# Patient Record
Sex: MALE | Race: OTHER
[De-identification: names, ages, dates, MRNs, and addresses within clinical notes are randomized per-mention and may not be internally consistent; named-entity substitution may affect disease eponyms.]

---

## 2018-01-23 ENCOUNTER — HOSPITAL ENCOUNTER (INPATIENT)
Dept: HOSPITAL 54 - ER | Age: 68
LOS: 4 days | Discharge: HOME | DRG: 280 | End: 2018-01-27
Attending: FAMILY MEDICINE | Admitting: FAMILY MEDICINE
Payer: MEDICARE

## 2018-01-23 VITALS — HEIGHT: 70 IN | WEIGHT: 195 LBS | BODY MASS INDEX: 27.92 KG/M2

## 2018-01-23 VITALS — SYSTOLIC BLOOD PRESSURE: 104 MMHG | DIASTOLIC BLOOD PRESSURE: 80 MMHG

## 2018-01-23 DIAGNOSIS — E66.2: ICD-10-CM

## 2018-01-23 DIAGNOSIS — F17.210: ICD-10-CM

## 2018-01-23 DIAGNOSIS — Z79.01: ICD-10-CM

## 2018-01-23 DIAGNOSIS — E11.22: ICD-10-CM

## 2018-01-23 DIAGNOSIS — E44.1: ICD-10-CM

## 2018-01-23 DIAGNOSIS — I21.A1: ICD-10-CM

## 2018-01-23 DIAGNOSIS — I13.0: Primary | ICD-10-CM

## 2018-01-23 DIAGNOSIS — E88.09: ICD-10-CM

## 2018-01-23 DIAGNOSIS — Z95.810: ICD-10-CM

## 2018-01-23 DIAGNOSIS — I48.91: ICD-10-CM

## 2018-01-23 DIAGNOSIS — I25.10: ICD-10-CM

## 2018-01-23 DIAGNOSIS — I50.33: ICD-10-CM

## 2018-01-23 DIAGNOSIS — Z86.73: ICD-10-CM

## 2018-01-23 DIAGNOSIS — E78.5: ICD-10-CM

## 2018-01-23 DIAGNOSIS — N17.0: ICD-10-CM

## 2018-01-23 DIAGNOSIS — J96.21: ICD-10-CM

## 2018-01-23 DIAGNOSIS — E11.65: ICD-10-CM

## 2018-01-23 DIAGNOSIS — Z66: ICD-10-CM

## 2018-01-23 DIAGNOSIS — Z95.1: ICD-10-CM

## 2018-01-23 DIAGNOSIS — N18.9: ICD-10-CM

## 2018-01-23 DIAGNOSIS — Z79.4: ICD-10-CM

## 2018-01-23 DIAGNOSIS — J96.22: ICD-10-CM

## 2018-01-23 LAB
ALBUMIN SERPL BCP-MCNC: 3 G/DL (ref 3.4–5)
ALP SERPL-CCNC: 220 U/L (ref 46–116)
ALT SERPL W P-5'-P-CCNC: 17 U/L (ref 12–78)
APTT PPP: 29 SEC (ref 23–34)
AST SERPL W P-5'-P-CCNC: 17 U/L (ref 15–37)
BASOPHILS # BLD AUTO: 0.3 /CMM (ref 0–0.2)
BASOPHILS NFR BLD AUTO: 4 % (ref 0–2)
BILIRUB DIRECT SERPL-MCNC: 0.3 MG/DL (ref 0–0.2)
BILIRUB SERPL-MCNC: 0.5 MG/DL (ref 0.2–1)
BUN SERPL-MCNC: 57 MG/DL (ref 7–18)
CALCIUM SERPL-MCNC: 8.5 MG/DL (ref 8.5–10.1)
CHLORIDE SERPL-SCNC: 102 MMOL/L (ref 98–107)
CO2 SERPL-SCNC: 30 MMOL/L (ref 21–32)
CREAT SERPL-MCNC: 3.4 MG/DL (ref 0.6–1.3)
EOSINOPHIL # BLD AUTO: 0.3 /CMM (ref 0–0.7)
EOSINOPHIL NFR BLD AUTO: 2.9 % (ref 0–6)
GLUCOSE SERPL-MCNC: 365 MG/DL (ref 74–106)
HCT VFR BLD AUTO: 48 % (ref 39–51)
HGB BLD-MCNC: 14.6 G/DL (ref 13.5–17.5)
INR PPP: 1.22 (ref 0.85–1.15)
LYMPHOCYTES NFR BLD AUTO: 1.4 /CMM (ref 0.8–4.8)
LYMPHOCYTES NFR BLD AUTO: 15.8 % (ref 20–44)
MCH RBC QN AUTO: 25 PG (ref 26–33)
MCHC RBC AUTO-ENTMCNC: 31 G/DL (ref 31–36)
MCV RBC AUTO: 81 FL (ref 80–96)
MONOCYTES NFR BLD AUTO: 0.7 /CMM (ref 0.1–1.3)
MONOCYTES NFR BLD AUTO: 7.5 % (ref 2–12)
NEUTROPHILS # BLD AUTO: 6.1 /CMM (ref 1.8–8.9)
NEUTROPHILS NFR BLD AUTO: 69.8 % (ref 43–81)
NT-PROBNP SERPL-MCNC: 4522 PG/ML (ref 0–125)
PLATELET # BLD AUTO: 188 /CMM (ref 150–450)
POTASSIUM SERPL-SCNC: 4.9 MMOL/L (ref 3.5–5.1)
PROT SERPL-MCNC: 7.4 G/DL (ref 6.4–8.2)
RBC # BLD AUTO: 5.89 MIL/UL (ref 4.5–6)
RDW COEFFICIENT OF VARIATION: 17.9 (ref 11.5–15)
SODIUM SERPL-SCNC: 138 MMOL/L (ref 136–145)
TROPONIN I SERPL-MCNC: 0.55 NG/ML (ref 0–0.06)
WBC NRBC COR # BLD AUTO: 8.8 K/UL (ref 4.3–11)

## 2018-01-23 PROCEDURE — A4606 OXYGEN PROBE USED W OXIMETER: HCPCS

## 2018-01-23 PROCEDURE — Z7610: HCPCS

## 2018-01-23 PROCEDURE — A4349 DISPOSABLE MALE EXTERNAL CAT: HCPCS

## 2018-01-23 RX ADMIN — INSULIN HUMAN PRN UNIT: 100 INJECTION, SOLUTION PARENTERAL at 21:54

## 2018-01-23 RX ADMIN — Medication SCH EACH: at 21:51

## 2018-01-23 NOTE — NUR
NANETTE RN NOTE

PT DENIES ANY CHEST PAIN OR DISCOMFORT. TROP RESULT CAME 0.647. REMAIN A FIB. CONTINUE TO 
MONITOR. VSS. PT AT TIMES DONOT FOLLOW THE IMPORTANCE OF O2 MASK ON. KEPT ON REMOVING IT. 
STATES "I AM OK DON'T WORRY". TEACHING ABOUT THE O2 DONE.

## 2018-01-23 NOTE — NUR
NANETTE RN NOTE

PT DENIED ANY SKIN ISSUES EARLIER, NOTED RT KNEE WITH BRUISE AND DR COTE, PER PT HE FELL 
DOWN BEFORE.

## 2018-01-23 NOTE — NUR
NANETTE RN NOTE

RECIEVED PT IN BED ASLEEP, AROUSABLE. A/O X 4, NO SOB NOTED. NO DISTRESS OR DISCOMFORT 
NOTED. DENIES PAIN. ON O2 15L SIMPLE MASK O2 SAT 94%. PT GETS SOB ON EXCERTION AND ALSO PT 
KEPT ON REMOVING THE MASK, REMINDED HIM NOT TO DO THAT.  ON TELE MONITOR A FIB HR 70'S 
CONTROLLED. SL #20 G IN LT HAND INTACT AND PATENT, NO S/S OF INFILTRATION NOTED. SKIN 
INTACT. FAMILY AT BED SIDE. KEPT HOB ELEVATED. WAITING FOR DR FAIR ADMITTING ORDERS, 
VSS. ORIENTED HIM TO HIS ROOM. SIDE RAILS UP X 2 AND CALL LIGHT WITHIN REACH. CONTINUE TO  
MONITOR HIM.

## 2018-01-23 NOTE — NUR
BBRA FROM HOME FOR SOB X 3 DAYS. PATIENT RECIEVED AWAKE AND ALERT, ON O2 VA NC 
@2LPM SATING 955. PATIENT APPEARS IN NO APPARENT DISTRESS. SKN IS WARM TO TOUCH 
AND NON DIAPHORETIC. PATIENT IS AFEBRILE. VSS.

## 2018-01-23 NOTE — NUR
NANETTE RN NOTE

DR RIZWAN SWARTZ INFORMED REGARDING TROP ELEVATED. AND CARDIAC CONSULT DR DARLING IN AM. NO 
NEW ORDER GIVEN AT THIS TIME.

## 2018-01-23 NOTE — NUR
NANETTE RN NOTE

DR FAIR VISITED THE FLOOR AND GAVE ADMITTING ORDERS, PT'S ADMITTING DX IS CHF 
EXACERBATION. ADMITTING ORDERS CHECKED AND CARRIED OUT.

## 2018-01-23 NOTE — NUR
NANETTE JEFF NOTE

PT MASK IS CHANGED TO REBRETHER MASK, O2 15L O2 SAT 96% AND AT ROOM AIR IT DROPS TO 82. ALSO 
REMINDED PT NOT TO REMOVE THE MASK. KEPT HOB ELEVATED TO HELP HIM BREATH BETTER. 

-------------------------------------------------------------------------------

Addendum: 01/24/18 at 0519 by SUMAN PRATT RN

-------------------------------------------------------------------------------

KEPT THE SIMPLE MASK. ERROR WRITTEN CHANGED TO REBREATHER MASK.

## 2018-01-24 VITALS — SYSTOLIC BLOOD PRESSURE: 94 MMHG | DIASTOLIC BLOOD PRESSURE: 60 MMHG

## 2018-01-24 VITALS — SYSTOLIC BLOOD PRESSURE: 128 MMHG | DIASTOLIC BLOOD PRESSURE: 50 MMHG

## 2018-01-24 VITALS — DIASTOLIC BLOOD PRESSURE: 67 MMHG | SYSTOLIC BLOOD PRESSURE: 103 MMHG

## 2018-01-24 VITALS — DIASTOLIC BLOOD PRESSURE: 61 MMHG | SYSTOLIC BLOOD PRESSURE: 134 MMHG

## 2018-01-24 VITALS — SYSTOLIC BLOOD PRESSURE: 113 MMHG | DIASTOLIC BLOOD PRESSURE: 74 MMHG

## 2018-01-24 VITALS — DIASTOLIC BLOOD PRESSURE: 57 MMHG | SYSTOLIC BLOOD PRESSURE: 84 MMHG

## 2018-01-24 VITALS — DIASTOLIC BLOOD PRESSURE: 63 MMHG | SYSTOLIC BLOOD PRESSURE: 100 MMHG

## 2018-01-24 VITALS — SYSTOLIC BLOOD PRESSURE: 101 MMHG | DIASTOLIC BLOOD PRESSURE: 61 MMHG

## 2018-01-24 VITALS — DIASTOLIC BLOOD PRESSURE: 56 MMHG | SYSTOLIC BLOOD PRESSURE: 108 MMHG

## 2018-01-24 VITALS — SYSTOLIC BLOOD PRESSURE: 103 MMHG | DIASTOLIC BLOOD PRESSURE: 67 MMHG

## 2018-01-24 VITALS — SYSTOLIC BLOOD PRESSURE: 100 MMHG | DIASTOLIC BLOOD PRESSURE: 63 MMHG

## 2018-01-24 VITALS — SYSTOLIC BLOOD PRESSURE: 106 MMHG | DIASTOLIC BLOOD PRESSURE: 70 MMHG

## 2018-01-24 VITALS — SYSTOLIC BLOOD PRESSURE: 134 MMHG | DIASTOLIC BLOOD PRESSURE: 61 MMHG

## 2018-01-24 VITALS — DIASTOLIC BLOOD PRESSURE: 69 MMHG | SYSTOLIC BLOOD PRESSURE: 115 MMHG

## 2018-01-24 VITALS — SYSTOLIC BLOOD PRESSURE: 152 MMHG | DIASTOLIC BLOOD PRESSURE: 45 MMHG

## 2018-01-24 VITALS — DIASTOLIC BLOOD PRESSURE: 74 MMHG | SYSTOLIC BLOOD PRESSURE: 113 MMHG

## 2018-01-24 VITALS — DIASTOLIC BLOOD PRESSURE: 60 MMHG | SYSTOLIC BLOOD PRESSURE: 110 MMHG

## 2018-01-24 VITALS — DIASTOLIC BLOOD PRESSURE: 70 MMHG | SYSTOLIC BLOOD PRESSURE: 106 MMHG

## 2018-01-24 VITALS — DIASTOLIC BLOOD PRESSURE: 55 MMHG | SYSTOLIC BLOOD PRESSURE: 95 MMHG

## 2018-01-24 VITALS — SYSTOLIC BLOOD PRESSURE: 102 MMHG | DIASTOLIC BLOOD PRESSURE: 58 MMHG

## 2018-01-24 VITALS — SYSTOLIC BLOOD PRESSURE: 107 MMHG | DIASTOLIC BLOOD PRESSURE: 56 MMHG

## 2018-01-24 LAB
BASE EXCESS BLDA CALC-SCNC: -0.1 MMOL/L
BASE EXCESS BLDA CALC-SCNC: -0.4 MMOL/L
BASE EXCESS BLDA CALC-SCNC: -2.7 MMOL/L
BASE EXCESS BLDA CALC-SCNC: -4.8 MMOL/L
BASOPHILS # BLD AUTO: 0.2 /CMM (ref 0–0.2)
BASOPHILS NFR BLD AUTO: 1.8 % (ref 0–2)
BUN SERPL-MCNC: 53 MG/DL (ref 7–18)
CALCIUM SERPL-MCNC: 8.4 MG/DL (ref 8.5–10.1)
CHLORIDE SERPL-SCNC: 104 MMOL/L (ref 98–107)
CHOLEST SERPL-MCNC: 92 MG/DL (ref ?–200)
CO2 SERPL-SCNC: 32 MMOL/L (ref 21–32)
CREAT SERPL-MCNC: 3.2 MG/DL (ref 0.6–1.3)
DO-HGB MFR BLDA: 177.4 MMHG
DO-HGB MFR BLDA: 197.5 MMHG
DO-HGB MFR BLDA: 198.7 MMHG
DO-HGB MFR BLDA: 255.9 MMHG
EOSINOPHIL # BLD AUTO: 0.3 /CMM (ref 0–0.7)
EOSINOPHIL NFR BLD AUTO: 2.8 % (ref 0–6)
GLUCOSE SERPL-MCNC: 122 MG/DL (ref 74–106)
HCT VFR BLD AUTO: 47 % (ref 39–51)
HDLC SERPL-MCNC: 40 MG/DL (ref 40–60)
HGB BLD-MCNC: 14.3 G/DL (ref 13.5–17.5)
INHALED O2 CONCENTRATION: 45 %
INHALED O2 CONCENTRATION: 45 %
INHALED O2 CONCENTRATION: 52 %
INHALED O2 CONCENTRATION: 55 %
INHALED O2 FLOW RATE: 8 L/MIN (ref 0–30)
INTRINSIC PEEP RESPIRATORY: 5 CM H2O
LDLC SERPL DIRECT ASSAY-MCNC: 45 MG/DL (ref 0–99)
LYMPHOCYTES NFR BLD AUTO: 1.2 /CMM (ref 0.8–4.8)
LYMPHOCYTES NFR BLD AUTO: 12.1 % (ref 20–44)
MAGNESIUM SERPL-MCNC: 2.2 MG/DL (ref 1.8–2.4)
MCH RBC QN AUTO: 25 PG (ref 26–33)
MCHC RBC AUTO-ENTMCNC: 30 G/DL (ref 31–36)
MCV RBC AUTO: 82 FL (ref 80–96)
MONOCYTES NFR BLD AUTO: 0.8 /CMM (ref 0.1–1.3)
MONOCYTES NFR BLD AUTO: 8.4 % (ref 2–12)
NEUTROPHILS # BLD AUTO: 7.6 /CMM (ref 1.8–8.9)
NEUTROPHILS NFR BLD AUTO: 74.9 % (ref 43–81)
PCO2 TEMP ADJ BLDA: 60.8 MMHG (ref 35–45)
PCO2 TEMP ADJ BLDA: 62 MMHG (ref 35–45)
PCO2 TEMP ADJ BLDA: 70.7 MMHG (ref 35–45)
PCO2 TEMP ADJ BLDA: 87.6 MMHG (ref 35–45)
PEEP SETTING VENT: 360 ML
PH TEMP ADJ BLDA: 7.11 [PH] (ref 7.35–7.45)
PH TEMP ADJ BLDA: 7.24 [PH] (ref 7.35–7.45)
PH TEMP ADJ BLDA: 7.25 [PH] (ref 7.35–7.45)
PH TEMP ADJ BLDA: 7.27 [PH] (ref 7.35–7.45)
PHOSPHATE SERPL-MCNC: 6.1 MG/DL (ref 2.5–4.9)
PLATELET # BLD AUTO: 169 /CMM (ref 150–450)
PO2 TEMP ADJ BLDA: 53 MMHG (ref 75–100)
PO2 TEMP ADJ BLDA: 63 MMHG (ref 75–100)
PO2 TEMP ADJ BLDA: 67.3 MMHG (ref 75–100)
PO2 TEMP ADJ BLDA: 75.2 MMHG (ref 75–100)
POTASSIUM SERPL-SCNC: 5.3 MMOL/L (ref 3.5–5.1)
RBC # BLD AUTO: 5.73 MIL/UL (ref 4.5–6)
RDW COEFFICIENT OF VARIATION: 19.6 (ref 11.5–15)
SAO2 % BLDA: 86.5 % (ref 92–98.5)
SAO2 % BLDA: 91.1 % (ref 92–98.5)
SAO2 % BLDA: 92.9 % (ref 92–98.5)
SAO2 % BLDA: 93 % (ref 92–98.5)
SET RATE, BG: 14
SET RATE, BG: 16
SET RATE, BG: 16
SODIUM SERPL-SCNC: 140 MMOL/L (ref 136–145)
TRIGL SERPL-MCNC: 70 MG/DL (ref 30–150)
VENTILATION MODE VENT: (no result)
WBC NRBC COR # BLD AUTO: 10.1 K/UL (ref 4.3–11)

## 2018-01-24 PROCEDURE — 5A09357 ASSISTANCE WITH RESPIRATORY VENTILATION, LESS THAN 24 CONSECUTIVE HOURS, CONTINUOUS POSITIVE AIRWAY PRESSURE: ICD-10-PCS | Performed by: INTERNAL MEDICINE

## 2018-01-24 RX ADMIN — Medication SCH EACH: at 22:18

## 2018-01-24 RX ADMIN — PANTOPRAZOLE SODIUM SCH MG: 40 TABLET, DELAYED RELEASE ORAL at 09:00

## 2018-01-24 RX ADMIN — SODIUM CHLORIDE SCH MG: 9 INJECTION, SOLUTION INTRAVENOUS at 12:33

## 2018-01-24 RX ADMIN — ATORVASTATIN CALCIUM SCH MG: 40 TABLET, FILM COATED ORAL at 22:18

## 2018-01-24 RX ADMIN — INSULIN GLARGINE SCH UNIT: 100 INJECTION, SOLUTION SUBCUTANEOUS at 22:00

## 2018-01-24 RX ADMIN — ALBUTEROL SULFATE SCH MG: 2.5 SOLUTION RESPIRATORY (INHALATION) at 03:25

## 2018-01-24 RX ADMIN — ALBUTEROL SULFATE SCH MG: 2.5 SOLUTION RESPIRATORY (INHALATION) at 20:45

## 2018-01-24 RX ADMIN — SODIUM CHLORIDE SCH MG: 9 INJECTION, SOLUTION INTRAVENOUS at 17:12

## 2018-01-24 RX ADMIN — SODIUM CHLORIDE SCH MG: 9 INJECTION, SOLUTION INTRAVENOUS at 09:29

## 2018-01-24 RX ADMIN — GLIMEPIRIDE SCH MG: 4 TABLET ORAL at 09:00

## 2018-01-24 RX ADMIN — ALBUTEROL SULFATE SCH MG: 2.5 SOLUTION RESPIRATORY (INHALATION) at 23:11

## 2018-01-24 RX ADMIN — Medication SCH MG: at 12:32

## 2018-01-24 RX ADMIN — ALBUTEROL SULFATE SCH MG: 2.5 SOLUTION RESPIRATORY (INHALATION) at 07:39

## 2018-01-24 RX ADMIN — Medication SCH EACH: at 12:33

## 2018-01-24 RX ADMIN — FUROSEMIDE SCH MG: 10 INJECTION, SOLUTION INTRAMUSCULAR; INTRAVENOUS at 09:00

## 2018-01-24 RX ADMIN — ALBUTEROL SULFATE SCH MG: 2.5 SOLUTION RESPIRATORY (INHALATION) at 12:14

## 2018-01-24 RX ADMIN — Medication SCH EACH: at 09:08

## 2018-01-24 RX ADMIN — ALBUTEROL SULFATE SCH MG: 2.5 SOLUTION RESPIRATORY (INHALATION) at 16:27

## 2018-01-24 RX ADMIN — Medication SCH EACH: at 17:43

## 2018-01-24 RX ADMIN — Medication SCH EA: at 17:12

## 2018-01-24 NOTE — NUR
RT NOTE:

LATE ENTRY- PATIENT FOUND IN DISTRESS ON A NON-REBREATHER MASK AT 15 LPM. SP02 94%. NOTIFIED 
CHARGE NURSE (AMARA). PLACED ON HHN VIA AEROSOL MASK AND ABG WAS ORDERED AND DRAWN.

ABG RESULTS WERE REPORTED TO CHARGE NURSE. 

[id number] WAS TRANSFERRED TO ICU AND PLACED BIPAP. SETTINGS PER ORDER. PATIENT 
TOLERATING WELL. ALARMS SET AND AUDIBLE. CHANGES MADE PER 'S ORDER. AMBU BAG AT [facility].

## 2018-01-24 NOTE — NUR
NANETTE RN NOTES



PATIENT TRANSFERRED TO ICU . REPORT GIVEN EMETERIO WAKEFIELD

ALL BELONGINGS CHECKED AND ENDORSED.

-------------------------------------------------------------------------------

Addendum: 01/24/18 at 0946 by RAMY NAJERA RN

-------------------------------------------------------------------------------

ADDENDUM



UNABLE TO OBTAIN ACCUCHECK DUE TO ONGOING [facility] STAFF MEETING AT THE FLOOR AND PATIENT 
TRANSFERRED RIGHT AWAY.

## 2018-01-24 NOTE — NUR
NANETTE RN NOTE

TROPONIN LEVEL TRENDING DOWN TO 0.577, DR DOMINGUEZ MADE AWARE. PT DENIES ANY CHEST PAIN.

## 2018-01-24 NOTE — NUR
ICU RN NOTE



0830: Received patient from [facility] for Bipap needs secondary to CHF exacerbation. Patient is 
awake, A/Ox3, Syrian speaking, understands little English. No c/o discomfort at this time. 
Sat 86% on NRB mask. Placed by RT on Bipap, 18/5 45%, rate 16. With PIV on LH g20, placed 
new PIV RH g18. Afib 80's. Noted with crackles and rales during auscultation. Will keep NPO 
for now for Bipap.



0900: Paced on condom catheter for output monitoring. Patient agreed. Asked family to bring 
Eliquis from home per pharmacy, wife said she will get it after lunch, made pharmacy aware. 
S/E by Dr. Gar, with order to given 80 Lasix x3 today.



0920: S/E by Dr. Chávez, with order to repeat ABG after 1 hr.



1050: Dr. Chávez aware for ABG, with order to change Bipap setting 25/10, and repeat ABG @ 
1300, RT aware.

## 2018-01-24 NOTE — NUR
NANETTE RN AM NOTE



RECEIVED PT IN BED, EYES CLOSED, RESPONDS TO NAME AND TOUCH, AOX 3, ON NON REBREATHING MASK 
ON O2 15L O2 SAT 94%. RESPIRATION UNLABORED,TELE METRY READS AFIB HR 84, DENIES CHEST 
PAIN/DISCOMFORT. LEFT HAND G20 IVHL, FLUSHES WELL, SITE CLEAR. SE  NURSING FLOWSHEET FOR 
SKIN ISSUES. CARDIAC CCHO DIET. BEDBOUND FOR NOW. SIDE RAILS UP X 2 AND CALL LIGHT WITHIN 
REACH. WILL CONTINUE TO MONITOR.

## 2018-01-24 NOTE — NUR
NANETTE RN NOTE

DR SWARTZ INFORMED THAT PT IS VERY CONGESTED WITH SOB, MD GAVE NEW ORDER OF BTX, ORDER NOTED 
AND CARRIED OUT. 

-------------------------------------------------------------------------------

Addendum: 01/24/18 at 0236 by SUMAN PRATT RN

-------------------------------------------------------------------------------

RT INFORMED.

## 2018-01-24 NOTE — NUR
ICU RN NOTE



Patient will be on Bipap overnight, patient remained compliant. Kept clean, warm and dry. 
Needs attended. Kept call light at reach. PIVs intact. No any significant changes noted at 
this time. Condom catheter intact. Noted with 2050mL urine output during the shift.

## 2018-01-24 NOTE — NUR
NANETTE RN NOTE

PT NOTED O2 SAT DROPPING TO 89%, MASK CHANGE TO NON RE BREATHING. O2 SAT WENT UP TO 95% ON 
15 L. KEPT HIGH PHILLIPS POSITION.

## 2018-01-24 NOTE — NUR
NANETTE RN NOTE

PT NOTED O2 SAT 91% LUNGS SOUND IS CRACKLING WHEN AUSCULTATED. DR SWARTZ INFORMED AND 
RECEIVED NEW ORDER, ORDER NOTED AND CARRIED OUT. LASIX 40 MG IVP GIVEN. K 4.9. VSS. CONTINUE 
TO MONITOR HIM.

## 2018-01-24 NOTE — NUR
NANETTE RN NOTE

PT IN BED ASLEEP, EASILY AROUSABLE. REMAIN ON NON REBREATHING MASK ON O2 15L O2 SAT 96%. SL 
ON LT HAND INTACT AND PATENT. ON TELE A FIB HR 'S. SIDE RAILS UP X 2 AND CALL LIGHT 
WITHIN REACH. WILL ENDORSE TO DAY SHIFT NURSE FOR CONTINUE TO CARE.

## 2018-01-24 NOTE — NUR
ICU RN INITIAL NOTE

RECEIVED PATIENT FROM EMETERIO JEFF. PT IN BED. A/A/O X4. ON BIPAP. TOLERATING SETTINGS. 25/8 
RATE 55% RATE 16. LUNG SOUNDS COARSE. BOWEL SOUNDS PRESENT. CONDOM CATH PATENT AND INTACT. 
PULSES PRESENT. IV PATENT AND INTACT. PT MOVES INDEPENDENTLY IN BED. BED IN LOW LOCKED 
POSITION. WILL CONTINUE TO MONITOR.

## 2018-01-24 NOTE — NUR
ICU RN NOTE



Updated Dr. Chávez re: ABG result, ordered no changes and will do ABG off Bipap in am. Wife 
at bedside, patient and wife aware for the POC.

## 2018-01-25 VITALS — DIASTOLIC BLOOD PRESSURE: 67 MMHG | SYSTOLIC BLOOD PRESSURE: 105 MMHG

## 2018-01-25 VITALS — DIASTOLIC BLOOD PRESSURE: 59 MMHG | SYSTOLIC BLOOD PRESSURE: 106 MMHG

## 2018-01-25 VITALS — DIASTOLIC BLOOD PRESSURE: 53 MMHG | SYSTOLIC BLOOD PRESSURE: 104 MMHG

## 2018-01-25 VITALS — DIASTOLIC BLOOD PRESSURE: 48 MMHG | SYSTOLIC BLOOD PRESSURE: 91 MMHG

## 2018-01-25 VITALS — SYSTOLIC BLOOD PRESSURE: 113 MMHG | DIASTOLIC BLOOD PRESSURE: 71 MMHG

## 2018-01-25 VITALS — SYSTOLIC BLOOD PRESSURE: 110 MMHG | DIASTOLIC BLOOD PRESSURE: 54 MMHG

## 2018-01-25 VITALS — DIASTOLIC BLOOD PRESSURE: 61 MMHG | SYSTOLIC BLOOD PRESSURE: 119 MMHG

## 2018-01-25 VITALS — DIASTOLIC BLOOD PRESSURE: 71 MMHG | SYSTOLIC BLOOD PRESSURE: 113 MMHG

## 2018-01-25 VITALS — DIASTOLIC BLOOD PRESSURE: 60 MMHG | SYSTOLIC BLOOD PRESSURE: 108 MMHG

## 2018-01-25 VITALS — DIASTOLIC BLOOD PRESSURE: 45 MMHG | SYSTOLIC BLOOD PRESSURE: 105 MMHG

## 2018-01-25 VITALS — DIASTOLIC BLOOD PRESSURE: 64 MMHG | SYSTOLIC BLOOD PRESSURE: 132 MMHG

## 2018-01-25 VITALS — SYSTOLIC BLOOD PRESSURE: 93 MMHG | DIASTOLIC BLOOD PRESSURE: 57 MMHG

## 2018-01-25 VITALS — SYSTOLIC BLOOD PRESSURE: 99 MMHG | DIASTOLIC BLOOD PRESSURE: 57 MMHG

## 2018-01-25 VITALS — SYSTOLIC BLOOD PRESSURE: 111 MMHG | DIASTOLIC BLOOD PRESSURE: 59 MMHG

## 2018-01-25 VITALS — SYSTOLIC BLOOD PRESSURE: 119 MMHG | DIASTOLIC BLOOD PRESSURE: 61 MMHG

## 2018-01-25 VITALS — DIASTOLIC BLOOD PRESSURE: 54 MMHG | SYSTOLIC BLOOD PRESSURE: 110 MMHG

## 2018-01-25 VITALS — SYSTOLIC BLOOD PRESSURE: 132 MMHG | DIASTOLIC BLOOD PRESSURE: 64 MMHG

## 2018-01-25 LAB
ALBUMIN SERPL BCP-MCNC: 2.7 G/DL (ref 3.4–5)
ALP SERPL-CCNC: 198 U/L (ref 46–116)
ALT SERPL W P-5'-P-CCNC: 17 U/L (ref 12–78)
AST SERPL W P-5'-P-CCNC: 16 U/L (ref 15–37)
BASE EXCESS BLDA CALC-SCNC: 1.9 MMOL/L
BASOPHILS # BLD AUTO: 0.1 /CMM (ref 0–0.2)
BASOPHILS NFR BLD AUTO: 1.1 % (ref 0–2)
BILIRUB SERPL-MCNC: 0.8 MG/DL (ref 0.2–1)
BUN SERPL-MCNC: 60 MG/DL (ref 7–18)
CALCIUM SERPL-MCNC: 8.4 MG/DL (ref 8.5–10.1)
CHLORIDE SERPL-SCNC: 102 MMOL/L (ref 98–107)
CO2 SERPL-SCNC: 31 MMOL/L (ref 21–32)
CREAT SERPL-MCNC: 3.2 MG/DL (ref 0.6–1.3)
DO-HGB MFR BLDA: 137.6 MMHG
EOSINOPHIL # BLD AUTO: 0.4 /CMM (ref 0–0.7)
EOSINOPHIL NFR BLD AUTO: 3.6 % (ref 0–6)
GLUCOSE SERPL-MCNC: 146 MG/DL (ref 74–106)
HCT VFR BLD AUTO: 46 % (ref 39–51)
HGB BLD-MCNC: 14.3 G/DL (ref 13.5–17.5)
INHALED O2 CONCENTRATION: 36 %
INHALED O2 FLOW RATE: 4 L/MIN (ref 0–30)
LYMPHOCYTES NFR BLD AUTO: 1.2 /CMM (ref 0.8–4.8)
LYMPHOCYTES NFR BLD AUTO: 12.3 % (ref 20–44)
MAGNESIUM SERPL-MCNC: 1.9 MG/DL (ref 1.8–2.4)
MCH RBC QN AUTO: 25 PG (ref 26–33)
MCHC RBC AUTO-ENTMCNC: 31 G/DL (ref 31–36)
MCV RBC AUTO: 81 FL (ref 80–96)
MONOCYTES NFR BLD AUTO: 0.8 /CMM (ref 0.1–1.3)
MONOCYTES NFR BLD AUTO: 7.7 % (ref 2–12)
NEUTROPHILS # BLD AUTO: 7.6 /CMM (ref 1.8–8.9)
NEUTROPHILS NFR BLD AUTO: 75.3 % (ref 43–81)
PCO2 TEMP ADJ BLDA: 58.5 MMHG (ref 35–45)
PH TEMP ADJ BLDA: 7.32 [PH] (ref 7.35–7.45)
PHOSPHATE SERPL-MCNC: 4.6 MG/DL (ref 2.5–4.9)
PLATELET # BLD AUTO: 177 /CMM (ref 150–450)
PO2 TEMP ADJ BLDA: 51.2 MMHG (ref 75–100)
POTASSIUM SERPL-SCNC: 4.4 MMOL/L (ref 3.5–5.1)
PROT SERPL-MCNC: 6.9 G/DL (ref 6.4–8.2)
RBC # BLD AUTO: 5.66 MIL/UL (ref 4.5–6)
RDW COEFFICIENT OF VARIATION: 19.6 (ref 11.5–15)
SAO2 % BLDA: 85.3 % (ref 92–98.5)
SODIUM SERPL-SCNC: 141 MMOL/L (ref 136–145)
TROPONIN I SERPL-MCNC: 0.4 NG/ML (ref 0–0.06)
VENTILATION MODE VENT: (no result)
WBC NRBC COR # BLD AUTO: 10.1 K/UL (ref 4.3–11)

## 2018-01-25 RX ADMIN — Medication SCH EA: at 08:14

## 2018-01-25 RX ADMIN — PANTOPRAZOLE SODIUM SCH MG: 40 TABLET, DELAYED RELEASE ORAL at 08:14

## 2018-01-25 RX ADMIN — ALBUTEROL SULFATE SCH MG: 2.5 SOLUTION RESPIRATORY (INHALATION) at 07:54

## 2018-01-25 RX ADMIN — INSULIN GLARGINE SCH UNIT: 100 INJECTION, SOLUTION SUBCUTANEOUS at 22:00

## 2018-01-25 RX ADMIN — FUROSEMIDE SCH MG: 10 INJECTION, SOLUTION INTRAMUSCULAR; INTRAVENOUS at 13:03

## 2018-01-25 RX ADMIN — ALBUTEROL SULFATE SCH MG: 2.5 SOLUTION RESPIRATORY (INHALATION) at 19:56

## 2018-01-25 RX ADMIN — Medication SCH EACH: at 13:05

## 2018-01-25 RX ADMIN — ALBUTEROL SULFATE SCH MG: 2.5 SOLUTION RESPIRATORY (INHALATION) at 16:25

## 2018-01-25 RX ADMIN — ALBUTEROL SULFATE SCH MG: 2.5 SOLUTION RESPIRATORY (INHALATION) at 23:54

## 2018-01-25 RX ADMIN — FUROSEMIDE SCH MG: 10 INJECTION, SOLUTION INTRAMUSCULAR; INTRAVENOUS at 08:14

## 2018-01-25 RX ADMIN — GLIMEPIRIDE SCH MG: 4 TABLET ORAL at 08:14

## 2018-01-25 RX ADMIN — Medication PRN EACH: at 16:50

## 2018-01-25 RX ADMIN — ALBUTEROL SULFATE SCH MG: 2.5 SOLUTION RESPIRATORY (INHALATION) at 11:21

## 2018-01-25 RX ADMIN — INSULIN HUMAN PRN UNIT: 100 INJECTION, SOLUTION PARENTERAL at 22:01

## 2018-01-25 RX ADMIN — Medication SCH EACH: at 18:05

## 2018-01-25 RX ADMIN — Medication SCH EA: at 16:50

## 2018-01-25 RX ADMIN — Medication SCH MG: at 08:14

## 2018-01-25 RX ADMIN — ALBUTEROL SULFATE SCH MG: 2.5 SOLUTION RESPIRATORY (INHALATION) at 03:54

## 2018-01-25 RX ADMIN — Medication SCH EACH: at 07:39

## 2018-01-25 RX ADMIN — Medication PRN EACH: at 01:03

## 2018-01-25 RX ADMIN — INSULIN HUMAN PRN UNIT: 100 INJECTION, SOLUTION PARENTERAL at 13:05

## 2018-01-25 RX ADMIN — FUROSEMIDE SCH MG: 10 INJECTION, SOLUTION INTRAMUSCULAR; INTRAVENOUS at 21:37

## 2018-01-25 RX ADMIN — Medication SCH EACH: at 21:32

## 2018-01-25 RX ADMIN — ATORVASTATIN CALCIUM SCH MG: 40 TABLET, FILM COATED ORAL at 21:37

## 2018-01-25 NOTE — NUR
RN NOTES

RECEIVED PT FROM ICU. A&0X3 PRIMARILY Mauritanian SPEAKING. ON 5L NC SATING 92%. A FIB ON THE 
TELE MADELINE HR 87. RH 18G IV, LH20G IV SITE INTACT. FAMILY AT BEDSIDE. CONDOM CATH DRAINING TO 
GRAVITY YELLOW IN COLOR. WILL CONT TO MADELINE.

## 2018-01-25 NOTE — NUR
RN NOTES

PT REMAINED IN STABLE CONDITION THROUGHOUT THE SHIFT, ALL NEEDS MET. WILL ENDORSE TO 
ONCOMING SHIFT.

## 2018-01-25 NOTE — NUR
ICU RN NOTE



S/E by Dr. Moctezuma, with order to transfer patient to [facility]. Transferred patient to [facility] via 
bed, stable. O2 sat 89-91% on 5LPM of O2 via NC. Made Dr. Chávez aware re: the transfer and 
agreed. Wife at bedside. Endorsed to Elaine JEFF for LATASHA. PIVs intact. Condom cath intact.

## 2018-01-25 NOTE — NUR
ICU RN NOTE



0720: Received patient on Bipap, no respiratory distress noted at this time. PIVs intact. 
Condom cath intact. Noted with clear yellow urine drained to BSD. Afib 80's on the monitor. 
No S/S hypo/hyperglycemia noted at this time. 



0900: S/E by Dr. Marie, with order to give 3doses of 80mg Lasix x3.



0915: S/E by Dr. Chávez, aware for the ABG, with order to place patient on 5LPM of O2 via NC 
from 4LPM.



1000: Wife at bedside, aware for the POC. No any significant changes noted at this time.

## 2018-01-25 NOTE — NUR
RN/TELE NOTES:



RECEIVED PT. IN BED W/ HOB ELEVATED AT 45 DEGREES. A/O X 3. MOSTLY Indian SPEAKING ONLY.  
ON TELE MONITOR W/ AFIB W/ PVC @ 76. DENIES ANY C/O CHEST PAIN OR SOB AT THIS TIME. ON 5L NC 
SAT  92% RH 18G IV, LH20G IV SITE INTACT. CONTINENT OF B/B. USES URINAL. CALL LIGHT W/ 
REACH. WILL CONT TO [facility].

## 2018-01-26 VITALS — DIASTOLIC BLOOD PRESSURE: 67 MMHG | SYSTOLIC BLOOD PRESSURE: 112 MMHG

## 2018-01-26 VITALS — DIASTOLIC BLOOD PRESSURE: 49 MMHG | SYSTOLIC BLOOD PRESSURE: 115 MMHG

## 2018-01-26 VITALS — DIASTOLIC BLOOD PRESSURE: 60 MMHG | SYSTOLIC BLOOD PRESSURE: 115 MMHG

## 2018-01-26 VITALS — DIASTOLIC BLOOD PRESSURE: 45 MMHG | SYSTOLIC BLOOD PRESSURE: 104 MMHG

## 2018-01-26 VITALS — SYSTOLIC BLOOD PRESSURE: 118 MMHG | DIASTOLIC BLOOD PRESSURE: 50 MMHG

## 2018-01-26 LAB
BASOPHILS # BLD AUTO: 0.1 /CMM (ref 0–0.2)
BASOPHILS NFR BLD AUTO: 0.6 % (ref 0–2)
BUN SERPL-MCNC: 56 MG/DL (ref 7–18)
CALCIUM SERPL-MCNC: 8.7 MG/DL (ref 8.5–10.1)
CHLORIDE SERPL-SCNC: 99 MMOL/L (ref 98–107)
CO2 SERPL-SCNC: 32 MMOL/L (ref 21–32)
CREAT SERPL-MCNC: 2.8 MG/DL (ref 0.6–1.3)
EOSINOPHIL # BLD AUTO: 0.4 /CMM (ref 0–0.7)
EOSINOPHIL NFR BLD AUTO: 3.3 % (ref 0–6)
GLUCOSE SERPL-MCNC: 129 MG/DL (ref 74–106)
HCT VFR BLD AUTO: 46 % (ref 39–51)
HGB BLD-MCNC: 14.3 G/DL (ref 13.5–17.5)
LYMPHOCYTES NFR BLD AUTO: 1.4 /CMM (ref 0.8–4.8)
LYMPHOCYTES NFR BLD AUTO: 12.9 % (ref 20–44)
MAGNESIUM SERPL-MCNC: 1.9 MG/DL (ref 1.8–2.4)
MCH RBC QN AUTO: 25 PG (ref 26–33)
MCHC RBC AUTO-ENTMCNC: 31 G/DL (ref 31–36)
MCV RBC AUTO: 80 FL (ref 80–96)
MONOCYTES NFR BLD AUTO: 1 /CMM (ref 0.1–1.3)
MONOCYTES NFR BLD AUTO: 9.7 % (ref 2–12)
NEUTROPHILS # BLD AUTO: 8 /CMM (ref 1.8–8.9)
NEUTROPHILS NFR BLD AUTO: 73.5 % (ref 43–81)
PHOSPHATE SERPL-MCNC: 4 MG/DL (ref 2.5–4.9)
PLATELET # BLD AUTO: 184 /CMM (ref 150–450)
POTASSIUM SERPL-SCNC: 3.6 MMOL/L (ref 3.5–5.1)
RBC # BLD AUTO: 5.71 MIL/UL (ref 4.5–6)
RDW COEFFICIENT OF VARIATION: 19 (ref 11.5–15)
SODIUM SERPL-SCNC: 141 MMOL/L (ref 136–145)
WBC NRBC COR # BLD AUTO: 10.8 K/UL (ref 4.3–11)

## 2018-01-26 RX ADMIN — Medication SCH EACH: at 08:10

## 2018-01-26 RX ADMIN — Medication SCH EACH: at 21:02

## 2018-01-26 RX ADMIN — ALBUTEROL SULFATE SCH MG: 2.5 SOLUTION RESPIRATORY (INHALATION) at 10:45

## 2018-01-26 RX ADMIN — Medication SCH EA: at 17:48

## 2018-01-26 RX ADMIN — Medication PRN EACH: at 19:46

## 2018-01-26 RX ADMIN — ALBUTEROL SULFATE SCH MG: 2.5 SOLUTION RESPIRATORY (INHALATION) at 08:24

## 2018-01-26 RX ADMIN — BUMETANIDE SCH MG: 1 TABLET ORAL at 13:27

## 2018-01-26 RX ADMIN — ALBUTEROL SULFATE SCH MG: 2.5 SOLUTION RESPIRATORY (INHALATION) at 15:30

## 2018-01-26 RX ADMIN — ALBUTEROL SULFATE SCH MG: 2.5 SOLUTION RESPIRATORY (INHALATION) at 23:00

## 2018-01-26 RX ADMIN — ALBUTEROL SULFATE SCH MG: 2.5 SOLUTION RESPIRATORY (INHALATION) at 20:03

## 2018-01-26 RX ADMIN — Medication SCH EA: at 08:11

## 2018-01-26 RX ADMIN — INSULIN HUMAN PRN UNIT: 100 INJECTION, SOLUTION PARENTERAL at 08:15

## 2018-01-26 RX ADMIN — INSULIN HUMAN PRN UNIT: 100 INJECTION, SOLUTION PARENTERAL at 17:47

## 2018-01-26 RX ADMIN — GLIMEPIRIDE SCH MG: 4 TABLET ORAL at 08:10

## 2018-01-26 RX ADMIN — PANTOPRAZOLE SODIUM SCH MG: 40 TABLET, DELAYED RELEASE ORAL at 08:10

## 2018-01-26 RX ADMIN — ALBUTEROL SULFATE SCH MG: 2.5 SOLUTION RESPIRATORY (INHALATION) at 03:55

## 2018-01-26 RX ADMIN — Medication SCH EACH: at 17:46

## 2018-01-26 RX ADMIN — INSULIN HUMAN PRN UNIT: 100 INJECTION, SOLUTION PARENTERAL at 12:30

## 2018-01-26 RX ADMIN — ATORVASTATIN CALCIUM SCH MG: 40 TABLET, FILM COATED ORAL at 21:09

## 2018-01-26 RX ADMIN — Medication SCH EACH: at 12:11

## 2018-01-26 RX ADMIN — Medication SCH MG: at 08:11

## 2018-01-26 RX ADMIN — FUROSEMIDE SCH MG: 10 INJECTION, SOLUTION INTRAMUSCULAR; INTRAVENOUS at 04:30

## 2018-01-26 RX ADMIN — INSULIN GLARGINE SCH UNIT: 100 INJECTION, SOLUTION SUBCUTANEOUS at 22:00

## 2018-01-26 RX ADMIN — INSULIN HUMAN PRN UNIT: 100 INJECTION, SOLUTION PARENTERAL at 21:07

## 2018-01-26 RX ADMIN — Medication PRN EACH: at 11:09

## 2018-01-26 NOTE — NUR
SOB ON EXERTION. 6L NC 93%. AMBULATED WITH PT. OOB AD KIKO. WIFE AT BEDSIDE. ACCIDENTALLY 
PULLED X2 IV HL THIS SHIFT. WILL RESTART. TOLERATING DIET. TELE SHOWS AFIB WITH BBB, PVS'S 
BIGEMINY. BED LOW AND LOCKED. CALL LIGHT WITHIN REACHED. WILL CONT TO MONITOR.

## 2018-01-26 NOTE — NUR
RN/TELE NOTES:



RECEIVED PT. IN BED W/ HOB ELEVATED AT 45 DEGREES. A/O X 3. MOSTLY Ukrainian SPEAKING ONLY.  
ON TELE MONITOR W/ AFIB W/ PVC @ 76. DENIES ANY C/O CHEST PAIN OR SOB AT THIS TIME. ON 6L NC 
SAT  91 % WELL.  IV SL TO RH G 20 PATENT AND INTACT W/ NO S/S OF INFECTION/INFILTRATION 
NOTED.. CONTINENT OF B/B. ENCOURAGED TO USE URINAL FOR ACCURATE OUTPUT. CALL LIGHT W/ REACH. 
PT. REFUSED BIPAP TONIGHT. NOT IN ANY RESPIRATORY DISTRESS. WILL CONTINUE TO MONITOR.

## 2018-01-27 VITALS — DIASTOLIC BLOOD PRESSURE: 43 MMHG | SYSTOLIC BLOOD PRESSURE: 99 MMHG

## 2018-01-27 VITALS — SYSTOLIC BLOOD PRESSURE: 104 MMHG | DIASTOLIC BLOOD PRESSURE: 65 MMHG

## 2018-01-27 VITALS — SYSTOLIC BLOOD PRESSURE: 116 MMHG | DIASTOLIC BLOOD PRESSURE: 51 MMHG

## 2018-01-27 LAB
BASOPHILS # BLD AUTO: 0.1 /CMM (ref 0–0.2)
BASOPHILS NFR BLD AUTO: 0.7 % (ref 0–2)
BUN SERPL-MCNC: 52 MG/DL (ref 7–18)
CALCIUM SERPL-MCNC: 8.7 MG/DL (ref 8.5–10.1)
CHLORIDE SERPL-SCNC: 98 MMOL/L (ref 98–107)
CO2 SERPL-SCNC: 36 MMOL/L (ref 21–32)
CREAT SERPL-MCNC: 2.3 MG/DL (ref 0.6–1.3)
EOSINOPHIL # BLD AUTO: 0.4 /CMM (ref 0–0.7)
EOSINOPHIL NFR BLD AUTO: 4.2 % (ref 0–6)
GLUCOSE SERPL-MCNC: 158 MG/DL (ref 74–106)
HCT VFR BLD AUTO: 48 % (ref 39–51)
HGB BLD-MCNC: 15 G/DL (ref 13.5–17.5)
LYMPHOCYTES NFR BLD AUTO: 1.2 /CMM (ref 0.8–4.8)
LYMPHOCYTES NFR BLD AUTO: 13.1 % (ref 20–44)
MCH RBC QN AUTO: 25 PG (ref 26–33)
MCHC RBC AUTO-ENTMCNC: 32 G/DL (ref 31–36)
MCV RBC AUTO: 80 FL (ref 80–96)
MONOCYTES NFR BLD AUTO: 0.8 /CMM (ref 0.1–1.3)
MONOCYTES NFR BLD AUTO: 8.3 % (ref 2–12)
NEUTROPHILS # BLD AUTO: 6.8 /CMM (ref 1.8–8.9)
NEUTROPHILS NFR BLD AUTO: 73.7 % (ref 43–81)
PLATELET # BLD AUTO: 178 /CMM (ref 150–450)
POTASSIUM SERPL-SCNC: 3.4 MMOL/L (ref 3.5–5.1)
RBC # BLD AUTO: 5.93 MIL/UL (ref 4.5–6)
RDW COEFFICIENT OF VARIATION: 18.3 (ref 11.5–15)
SODIUM SERPL-SCNC: 141 MMOL/L (ref 136–145)
WBC NRBC COR # BLD AUTO: 9.3 K/UL (ref 4.3–11)

## 2018-01-27 RX ADMIN — PANTOPRAZOLE SODIUM SCH MG: 40 TABLET, DELAYED RELEASE ORAL at 08:25

## 2018-01-27 RX ADMIN — GLIMEPIRIDE SCH MG: 4 TABLET ORAL at 08:25

## 2018-01-27 RX ADMIN — Medication SCH EA: at 08:27

## 2018-01-27 RX ADMIN — INSULIN HUMAN PRN UNIT: 100 INJECTION, SOLUTION PARENTERAL at 08:16

## 2018-01-27 RX ADMIN — Medication SCH EACH: at 07:30

## 2018-01-27 RX ADMIN — ALBUTEROL SULFATE SCH MG: 2.5 SOLUTION RESPIRATORY (INHALATION) at 03:01

## 2018-01-27 RX ADMIN — BUMETANIDE SCH MG: 1 TABLET ORAL at 08:26

## 2018-01-27 RX ADMIN — INSULIN HUMAN PRN UNIT: 100 INJECTION, SOLUTION PARENTERAL at 08:34

## 2018-01-27 RX ADMIN — Medication SCH MG: at 08:26

## 2018-01-27 NOTE — NUR
RN/TELE NOTES:



PT. REFUSED BIPAP LAST NIGHT. SLEEPS ON AND OFF IN BED AND IN CHAIR. WIFE VISITED PT. LAST 
NIGHT. NOT ANY ACUTE CHANGES NOTED DURING THE SHIFT. REPORT GIVEN TO AM NURSE FOR LATASHA.

## 2018-01-27 NOTE — NUR
TELE RN OPENING NOTES



RECEIVED PATIENT IN BED RESTING IN BED. A/OX3, Georgian SPEAKING.ON O2 6LPM VIA NC, SPO2 
93%. HOB ELEVATED. NO ACUTE DISTRESS, NO SOB NOTED. DENIES PAIN OR DISCOMFORT. IV SITE 
INTACT AND PATENT. KEPT PATIENT SAFE AND COMFORTABLE IN BED. BED IN LOW LOCKED POSITION, 
SIDERAILS UPX2, CALL LIGHT IN REACH. WILL CONTINUE TO MONITOR ACCORDINGLY.

## 2018-01-27 NOTE — NUR
RN DISCHARGE NOTES



DISCHARGED PATIENT IN STABLE CONDITION ACCOMPANIED BY DAUGHTER AND RN. DISCHARGE 
INSTRUCTIONS GIVEN, VERBALIZED UNDERSTANDING. DISCONTINUED IV SITE, APPLIED PRESSURE, NO 
BLEEDING, NO COMPLICATIONS NOTED. REMOVED ARM BAND. ALL BELONGING RETURNED, FORMED SIGNED.

## 2018-12-07 ENCOUNTER — HOSPITAL ENCOUNTER (INPATIENT)
Age: 68
LOS: 6 days | Discharge: HOME | DRG: 280 | End: 2018-12-13

## 2019-02-11 ENCOUNTER — HOSPITAL ENCOUNTER (INPATIENT)
Dept: HOSPITAL 91 - E/R | Age: 69
LOS: 12 days | Discharge: HOME | DRG: 291 | End: 2019-02-23
Payer: MEDICARE

## 2019-02-11 ENCOUNTER — HOSPITAL ENCOUNTER (INPATIENT)
Dept: HOSPITAL 10 - E/R | Age: 69
LOS: 12 days | Discharge: HOME | DRG: 291 | End: 2019-02-23
Attending: INTERNAL MEDICINE | Admitting: HOSPITALIST
Payer: MEDICARE

## 2019-02-11 VITALS — RESPIRATION RATE: 20 BRPM | HEART RATE: 48 BPM | DIASTOLIC BLOOD PRESSURE: 60 MMHG | SYSTOLIC BLOOD PRESSURE: 105 MMHG

## 2019-02-11 VITALS — RESPIRATION RATE: 18 BRPM | SYSTOLIC BLOOD PRESSURE: 110 MMHG | HEART RATE: 71 BPM | DIASTOLIC BLOOD PRESSURE: 55 MMHG

## 2019-02-11 VITALS — HEART RATE: 81 BPM | SYSTOLIC BLOOD PRESSURE: 115 MMHG | DIASTOLIC BLOOD PRESSURE: 72 MMHG | RESPIRATION RATE: 19 BRPM

## 2019-02-11 VITALS
WEIGHT: 187.39 LBS | BODY MASS INDEX: 28.4 KG/M2 | HEIGHT: 68 IN | BODY MASS INDEX: 28.4 KG/M2 | HEIGHT: 68 IN | WEIGHT: 187.39 LBS

## 2019-02-11 VITALS — HEART RATE: 67 BPM

## 2019-02-11 VITALS — HEART RATE: 55 BPM

## 2019-02-11 DIAGNOSIS — R60.0: ICD-10-CM

## 2019-02-11 DIAGNOSIS — Z79.01: ICD-10-CM

## 2019-02-11 DIAGNOSIS — E78.5: ICD-10-CM

## 2019-02-11 DIAGNOSIS — Z95.1: ICD-10-CM

## 2019-02-11 DIAGNOSIS — I25.10: ICD-10-CM

## 2019-02-11 DIAGNOSIS — I48.91: ICD-10-CM

## 2019-02-11 DIAGNOSIS — I42.9: ICD-10-CM

## 2019-02-11 DIAGNOSIS — E66.3: ICD-10-CM

## 2019-02-11 DIAGNOSIS — Z95.810: ICD-10-CM

## 2019-02-11 DIAGNOSIS — Z79.4: ICD-10-CM

## 2019-02-11 DIAGNOSIS — E11.22: ICD-10-CM

## 2019-02-11 DIAGNOSIS — F17.200: ICD-10-CM

## 2019-02-11 DIAGNOSIS — I50.23: ICD-10-CM

## 2019-02-11 DIAGNOSIS — I13.0: Primary | ICD-10-CM

## 2019-02-11 DIAGNOSIS — N17.9: ICD-10-CM

## 2019-02-11 DIAGNOSIS — K76.0: ICD-10-CM

## 2019-02-11 DIAGNOSIS — N40.0: ICD-10-CM

## 2019-02-11 DIAGNOSIS — N18.9: ICD-10-CM

## 2019-02-11 DIAGNOSIS — Z90.49: ICD-10-CM

## 2019-02-11 DIAGNOSIS — D50.9: ICD-10-CM

## 2019-02-11 LAB
ADD MAN DIFF?: NO
ADD UMIC: NO
ANION GAP: 14 (ref 5–13)
B-TYPE NATRIURETIC PEPTIDE: (no result) PG/ML (ref 0–125)
BASOPHIL #: 0.1 10^3/UL (ref 0–0.1)
BASOPHILS %: 1.3 % (ref 0–2)
BLOOD UREA NITROGEN: 54 MG/DL (ref 7–20)
CALCIUM: 9.8 MG/DL (ref 8.4–10.2)
CARBON DIOXIDE: 28 MMOL/L (ref 21–31)
CHLORIDE: 101 MMOL/L (ref 97–110)
CK INDEX: 3.1
CK INDEX: 3.8
CK-MB: 1.82 NG/ML (ref 0–2.4)
CK-MB: 2.45 NG/ML (ref 0–2.4)
CREATINE KINASE: 58 IU/L (ref 23–200)
CREATINE KINASE: 64 IU/L (ref 23–200)
CREATININE: 2.26 MG/DL (ref 0.61–1.24)
EOSINOPHILS #: 0.1 10^3/UL (ref 0–0.5)
EOSINOPHILS %: 1.8 % (ref 0–7)
FREE T4 (FREE THYROXINE): 2.16 NG/DL (ref 0.78–2.44)
GLUCOSE: 171 MG/DL (ref 70–220)
HEMATOCRIT: 38.7 % (ref 42–52)
HEMOGLOBIN: 12 G/DL (ref 14–18)
IMMATURE GRANS #M: 0.02 10^3/UL (ref 0–0.03)
IMMATURE GRANS % (M): 0.3 % (ref 0–0.43)
INR: 1.53
LYMPHOCYTES #: 1.3 10^3/UL (ref 0.8–2.9)
LYMPHOCYTES %: 19.9 % (ref 15–51)
MEAN CORPUSCULAR HEMOGLOBIN: 24.7 PG (ref 29–33)
MEAN CORPUSCULAR HGB CONC: 31 G/DL (ref 32–37)
MEAN CORPUSCULAR VOLUME: 79.6 FL (ref 82–101)
MEAN PLATELET VOLUME: 12.1 FL (ref 7.4–10.4)
MONOCYTE #: 0.5 10^3/UL (ref 0.3–0.9)
MONOCYTES %: 8 % (ref 0–11)
NEUTROPHIL #: 4.6 10^3/UL (ref 1.6–7.5)
NEUTROPHILS %: 68.7 % (ref 39–77)
NUCLEATED RED BLOOD CELLS #: 0 10^3/UL (ref 0–0)
NUCLEATED RED BLOOD CELLS%: 0 /100WBC (ref 0–0)
PARTIAL THROMBOPLASTIN TIME: 42.8 SEC (ref 23–35)
PLATELET COUNT: 179 10^3/UL (ref 140–415)
POTASSIUM: 4.3 MMOL/L (ref 3.5–5.1)
PROTIME: 18.5 SEC (ref 11.9–14.9)
PT RATIO: 1.4
RED BLOOD COUNT: 4.86 10^6/UL (ref 4.7–6.1)
RED CELL DISTRIBUTION WIDTH: 18.9 % (ref 11.5–14.5)
SODIUM,URINE RANDOM: 89 MMOL/L (ref 30–90)
SODIUM: 143 MMOL/L (ref 135–144)
TROPONIN-I: 0.14 NG/ML (ref 0–0.12)
TROPONIN-I: 0.16 NG/ML (ref 0–0.12)
TROPONIN-I: 0.2 NG/ML (ref 0–0.12)
UR ASCORBIC ACID: NEGATIVE MG/DL
UR BILIRUBIN (DIP): NEGATIVE MG/DL
UR BLOOD (DIP): NEGATIVE MG/DL
UR CLARITY: CLEAR
UR COLOR: YELLOW
UR GLUCOSE (DIP): NEGATIVE MG/DL
UR KETONES (DIP): NEGATIVE MG/DL
UR LEUKOCYTE ESTERASE (DIP): NEGATIVE LEU/UL
UR NITRITE (DIP): NEGATIVE MG/DL
UR PH (DIP): 5 (ref 5–9)
UR SPECIFIC GRAVITY (DIP): 1.01 (ref 1–1.03)
UR TOTAL PROTEIN (DIP): NEGATIVE MG/DL
UR UROBILINOGEN (DIP): NEGATIVE MG/DL
URINE EOSINOPHILS: 0 % (ref 0–1.9)
WHITE BLOOD COUNT: 6.7 10^3/UL (ref 4.8–10.8)

## 2019-02-11 PROCEDURE — 85730 THROMBOPLASTIN TIME PARTIAL: CPT

## 2019-02-11 PROCEDURE — 84439 ASSAY OF FREE THYROXINE: CPT

## 2019-02-11 PROCEDURE — 84100 ASSAY OF PHOSPHORUS: CPT

## 2019-02-11 PROCEDURE — 84443 ASSAY THYROID STIM HORMONE: CPT

## 2019-02-11 PROCEDURE — 36415 COLL VENOUS BLD VENIPUNCTURE: CPT

## 2019-02-11 PROCEDURE — 85025 COMPLETE CBC W/AUTO DIFF WBC: CPT

## 2019-02-11 PROCEDURE — 94664 DEMO&/EVAL PT USE INHALER: CPT

## 2019-02-11 PROCEDURE — 96374 THER/PROPH/DIAG INJ IV PUSH: CPT

## 2019-02-11 PROCEDURE — 83036 HEMOGLOBIN GLYCOSYLATED A1C: CPT

## 2019-02-11 PROCEDURE — 84300 ASSAY OF URINE SODIUM: CPT

## 2019-02-11 PROCEDURE — 80061 LIPID PANEL: CPT

## 2019-02-11 PROCEDURE — 97161 PT EVAL LOW COMPLEX 20 MIN: CPT

## 2019-02-11 PROCEDURE — 76705 ECHO EXAM OF ABDOMEN: CPT

## 2019-02-11 PROCEDURE — 71045 X-RAY EXAM CHEST 1 VIEW: CPT

## 2019-02-11 PROCEDURE — 85610 PROTHROMBIN TIME: CPT

## 2019-02-11 PROCEDURE — 89190 NASAL SMEAR FOR EOSINOPHILS: CPT

## 2019-02-11 PROCEDURE — 82803 BLOOD GASES ANY COMBINATION: CPT

## 2019-02-11 PROCEDURE — 74018 RADEX ABDOMEN 1 VIEW: CPT

## 2019-02-11 PROCEDURE — 36600 WITHDRAWAL OF ARTERIAL BLOOD: CPT

## 2019-02-11 PROCEDURE — 82962 GLUCOSE BLOOD TEST: CPT

## 2019-02-11 PROCEDURE — 83735 ASSAY OF MAGNESIUM: CPT

## 2019-02-11 PROCEDURE — 82550 ASSAY OF CK (CPK): CPT

## 2019-02-11 PROCEDURE — 83880 ASSAY OF NATRIURETIC PEPTIDE: CPT

## 2019-02-11 PROCEDURE — 84484 ASSAY OF TROPONIN QUANT: CPT

## 2019-02-11 PROCEDURE — 92610 EVALUATE SWALLOWING FUNCTION: CPT

## 2019-02-11 PROCEDURE — 94640 AIRWAY INHALATION TREATMENT: CPT

## 2019-02-11 PROCEDURE — 97165 OT EVAL LOW COMPLEX 30 MIN: CPT

## 2019-02-11 PROCEDURE — 80162 ASSAY OF DIGOXIN TOTAL: CPT

## 2019-02-11 PROCEDURE — 82553 CREATINE MB FRACTION: CPT

## 2019-02-11 PROCEDURE — 81003 URINALYSIS AUTO W/O SCOPE: CPT

## 2019-02-11 PROCEDURE — 99285 EMERGENCY DEPT VISIT HI MDM: CPT

## 2019-02-11 PROCEDURE — 93005 ELECTROCARDIOGRAM TRACING: CPT

## 2019-02-11 PROCEDURE — 80048 BASIC METABOLIC PNL TOTAL CA: CPT

## 2019-02-11 RX ADMIN — INSULIN GLARGINE 1 UNITS: 100 INJECTION, SOLUTION SUBCUTANEOUS at 21:46

## 2019-02-11 RX ADMIN — FAMOTIDINE SCH MG: 20 TABLET ORAL at 22:24

## 2019-02-11 RX ADMIN — INSULIN ASPART 1 UNIT: 100 INJECTION, SOLUTION INTRAVENOUS; SUBCUTANEOUS at 21:46

## 2019-02-11 RX ADMIN — NICOTINE 1 PATCH: 21 PATCH, EXTENDED RELEASE TRANSDERMAL at 18:06

## 2019-02-11 RX ADMIN — ACETAMINOPHEN 1 MG: 325 TABLET, FILM COATED ORAL at 22:02

## 2019-02-11 RX ADMIN — DOXAZOSIN SCH MG: 4 TABLET ORAL at 20:54

## 2019-02-11 RX ADMIN — INSULIN GLARGINE SCH UNITS: 100 INJECTION, SOLUTION SUBCUTANEOUS at 21:46

## 2019-02-11 RX ADMIN — FAMOTIDINE 1 MG: 20 TABLET ORAL at 22:24

## 2019-02-11 RX ADMIN — FERROUS SULFATE TAB 325 MG (65 MG ELEMENTAL FE) 1 MG: 325 (65 FE) TAB at 20:53

## 2019-02-11 RX ADMIN — FUROSEMIDE 1 MG: 10 INJECTION, SOLUTION INTRAVENOUS at 12:54

## 2019-02-11 RX ADMIN — NICOTINE SCH PATCH: 21 PATCH, EXTENDED RELEASE TRANSDERMAL at 18:06

## 2019-02-11 RX ADMIN — DOXAZOSIN 1 MG: 4 TABLET ORAL at 20:54

## 2019-02-11 RX ADMIN — ASPIRIN 81 MG CHEWABLE TABLET 1 MG: 81 TABLET CHEWABLE at 12:55

## 2019-02-11 RX ADMIN — APIXABAN SCH MG: 5 TABLET, FILM COATED ORAL at 20:53

## 2019-02-11 RX ADMIN — FERROUS SULFATE TAB 325 MG (65 MG ELEMENTAL FE) SCH MG: 325 (65 FE) TAB at 20:53

## 2019-02-11 RX ADMIN — ATORVASTATIN CALCIUM SCH MG: 80 TABLET, FILM COATED ORAL at 20:53

## 2019-02-11 RX ADMIN — ERGOCALCIFEROL 1 UNIT: 1.25 CAPSULE ORAL at 18:03

## 2019-02-11 RX ADMIN — HYDROCODONE BITARTRATE AND ACETAMINOPHEN 1 TAB: 5; 325 TABLET ORAL at 22:58

## 2019-02-11 RX ADMIN — INSULIN ASPART 1 UNIT: 100 INJECTION, SOLUTION INTRAVENOUS; SUBCUTANEOUS at 17:00

## 2019-02-11 RX ADMIN — ERGOCALCIFEROL SCH UNIT: 1.25 CAPSULE ORAL at 18:03

## 2019-02-11 RX ADMIN — ATORVASTATIN CALCIUM 1 MG: 80 TABLET, FILM COATED ORAL at 20:53

## 2019-02-11 RX ADMIN — HYDROCODONE BITARTRATE AND ACETAMINOPHEN PRN TAB: 5; 325 TABLET ORAL at 22:58

## 2019-02-11 RX ADMIN — APIXABAN 1 MG: 5 TABLET, FILM COATED ORAL at 20:53

## 2019-02-11 NOTE — CONS
DATE OF ADMISSION: 02/11/2019

DATE OF CONSULTATION:  

 

 

 

TYPE OF CONSULTATION:  Renal.

 

REASON FOR ADMISSION:  Shortness of breath and lower extremity edema.

 

REASON FOR CONSULTATION:  Acute on chronic renal failure.

 

HISTORY OF PRESENT ILLNESS:  This is a 68-year-old male with a past medical history of coronary arter
y bypass, AICD placement, CHF, cardiomyopathy and CKD with a creatinine ranging from 1.4 to 1.7 since
 2016.  The patient was last admitted in 12/2018.  At that time, the creatinine was ranging between 1
.5 to 2.1.  On discharge, the creatinine was 1.1.  The patient presented to the emergency department 
after being sent by Dr. Carr due to worsening in the bilateral lower extremity edema and shortness 
of breath for about 2 weeks.  The patient has been followed by Dr. Carr as an outpatient.  The 
ent was having abdominal distention and bilateral lower extremity edema for the past 2 weeks.  The jadyn ware was seen by Dr. Carr 3 days ago and Lasix was increased to 80 b.i.d.  He went to Dr. Carr's
 office today and still was having persistent edema and abdominal distention and was sent into the ER
 for further evaluation.  The patient denies any abdominal pain, nausea, vomiting and diarrhea.  Acco
rding to the patient, he has actually lost weight since seeing Dr. Carr of about 6 pounds.  On arri
tiffany to ED, vital signs showed a blood pressure of 124/61, heart rate 64, respirations 20, saturating 
96%.  Labs showed white count of 6.7, hemoglobin 12.0, platelet count 179, BUN of 15 and creatinine 2
.26.  Troponin 0.131 and 0.160.  BNP 19,300.  Chest x-ray showed cardiomegaly with failure and the jadyn ware was admitted for further management.

 

PAST MEDICAL HISTORY:

1.  Diabetes.

2.  Hypertension.

3.  Hyperlipidemia.

4.  Congestive heart failure.

5.  Atrial fibrillation.

6.  History of AICD.

7.  Chronic kidney disease IIIB likely secondary to diabetes/cardiac.

8.  Chronic obstructive pulmonary disease.

 

ALLERGIES:  NO KNOWN DRUG ALLERGIES.

 

PAST SURGICAL HISTORY:  Coronary artery bypass and AICD.

 

MEDICATIONS TAKING AT HOME:

1.  Eliquis 5 b.i.d.

2.  Lasix 40, which was increased to 80 b.i.d. for the last 3 days.

3.  Crestor 40.

4.  Doxazosin 8.

5.  Entresto.

6.  Digoxin 0.125.

7.  Metoprolol.

8.  Iron.

9.  Norvasc.

10.  Insulin.

 

SOCIAL HISTORY:  He did smoke cigarettes.  He denies any alcohol or any recreational drug use.

 

FAMILY HISTORY:  No history of kidney disease in the family.

 

REVIEW OF SYSTEMS:  The patient complains of shortness of breath, orthopnea, PND and lower extremity 
edema.  He denies any abdominal pain, nausea, vomiting or diarrhea.  He denies any headache or any bl
urry vision.  He denied any hematemesis, any melena or any blood per rectum.  Urine output has increa
sed somewhat when he has increased the Lasix.

 

PHYSICAL EXAMINATION:

VITAL SIGNS:  Blood pressure 125/85, pulse 70, respirations 20, saturating 95% on room air.

GENERAL:  The patient is awake, alert, oriented and appears to be in mild distress secondary to short
ness of breath.

HEENT:  Pupils equal, round and reactive to light.  Extraocular movements are intact.

NECK:  Supple.  JVD appreciated.

LUNGS:  Decreased breath sounds bilaterally.  Few crackles heard at the bases.

HEART:  Irregularly irregular.  No murmur, rub or gallop.

ABDOMEN:  Soft, somewhat distended with some abdominal wall edema.

EXTREMITIES:  Legs with 2 to 3+ pitting edema in bilateral lower extremity to mid-calf.

GENERAL:  Awake, alert, oriented and does not appear to in any acute distress.

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

 

DIAGNOSTIC DATA:  CBC is normal.  Sodium 143, potassium 4.3, chloride 101, bicarbonate 28, BUN of 54,
 creatinine 2.26, glucose 171.  First troponin 0.139.  Coags showed INR of 1.53.  Chest x-ray shows c
ardiomegaly and pulmonary edema.

 

ASSESSMENT:  A 68-year-old male presenting with:

1.  Shortness of breath with bilateral lower extremity edema, orthopnea and PND likely secondary to a
cute on chronic congestive heart failure.

2.  Acute on chronic renal failure; however, the patient's baseline creatinine ranges from 1.9 to 2. 
 On last discharge in December, the creatinine was 1.9.  This could be all secondary to cardiorenal a
s the patient is clearly in decompensated heart failure right now.

3.  History of atrial fibrillation.

4.  Hypertension.

5.  Diabetes.

6.  Hyperlipidemia.

7.  BPH.

8.  Positive troponin with chest pain ____.

9.  Elevated BNP.

 

PLAN:

1.  At this period of time, the patient will be admitted to telemetry.

2.  The patient should be on fluid restriction of 1 liter.

3.  The patient should monitor strict I's and O's.

4.  Agree with Lasix 40 IV b.i.d.

5.  We will check a UA, urine electrolytes and urine eosinophils.

6.  Would hold off Entresto at this point.

7.  We will check for digoxin levels.

8.  Keep a systolic blood pressure of greater than 100.

9.  Renally dose all meds.

10.  Avoid nephrotoxic agents.

11.  We will closely follow the patient with the Cardiology and primary team.  In case the patient ne
eded an angiogram, we will have to explain to him the risks and benefits.  The rest of the treatment 
will depend on the patient's hospitalization course.  I spoke to the family and the patient's wife at
 the bedside.

 

 

Dictated By: JOSE GUEVARA/KEISHA

DD:    02/11/2019 17:25:48

DT:    02/11/2019 18:02:30

Conf#: [id number]

DID#:  [id number]

## 2019-02-11 NOTE — ERD
ER Documentation


Chief Complaint


Chief Complaint





CHEST PAIN, BILAT LEG SWELLING, MILD SOB





HPI


68-year-old male history of coronary disease, CABG, CHF who presents to the 


emergency room by his primary cardiologist, Dr. Carr.  The patient apparently 


has failed the last several days of an oral diuretic.  The patient is noting 


worsening bilateral lower extremity, shortness of breath, dyspnea on exertion 


and PND.  Mild chest pain occasionally.  No pleuritic pain.  No fevers chills or


cough.





ROS


All systems reviewed and are negative except as per history of present illness.





Medications


Home Meds


Reported Medications


Metoprolol Succinate* (Toprol XL*) 25 Mg Tab.sr.24h, 25 MG PO DAILY, #30 TAB


   2/11/19


Sacubitril/Valsartan (Entresto 97 mg-103 mg Tablet) 1 Each Tablet, 1 TAB PO BID


   12/7/18


Digoxin* (Digox*) 125 Mcg Tablet, 0.125 MG PO DAILY, TAB


   12/7/18


Apixaban* (Eliquis*) 5 Mg Tablet, 5 MG PO BID, TAB


   3/14/16


Insulin Aspart* (Novolog Insulin Vial*) 100 U/Ml Vial, 0 SC SLIDING SCALE AC, 


VIAL


   3/14/16


Insulin Glargine* (Lantus*) 100 Unit/Ml Soln, 12 UNIT SC QHS, #1 VIAL


   3/14/16


Esomeprazole Mag Trihydrate (Nexium) 40 Mg Capsule.dr, 40 MG PO DAILY, #30 CAP


   3/14/16


Furosemide* (Furosemide*) 40 Mg Tablet, 40 MG PO DAILY, TAB


   3/14/16


Amlodipine Besylate* (Norvasc*) 5 Mg Tablet, 5 MG PO DAILY, TAB


   9/15/15


Rosuvastatin Calcium* (Crestor*) 20 Mg Tablet, 20 MG PO HS, TAB


   9/15/15


Ergocalciferol* (Drisdol* (Vitamin D2)) 50,000 Unit Capsule, 21502 UNIT PO Q7D, 


CAP


    MONDAYS


   9/15/15


Doxazosin Mesylate* (Doxazosin Mesylate*) 4 Mg Tablet, 4 MG PO BID, TAB


   3/13/15


Ferrous Sulfate* (Ferrous Sulfate*) 325 Mg Tabec, 325 MG PO BID, TAB


   3/13/15


Discontinued Reported Medications


Metoprolol Succinate* (Toprol XL*) 50 Mg Tab.er.24h, 50 MG PO DAILY, #30 TAB


   3/14/16


Valsartan* (Diovan*) 160 Mg Tablet, 160 MG PO DAILY, TAB


   9/15/15


Discontinued Scripts


Levofloxacin* (Levaquin*) 750 Mg Tablet, 750 MG PO Q48H, #3 TAB


   Prov:ROSANNE HERNÁNDEZ MD         12/13/18





Allergies


Allergies:  


Coded Allergies:  


     No Known Allergies (Verified  Allergy, Mild, 2/11/19)





PMhx/Soc


History of Surgery:  Yes (CABG, PM)


Anesthesia Reaction:  No


Hx Neurological Disorder:  No


Hx Respiratory Disorders:  Yes (CHF, COPD)


Hx Cardiac Disorders:  Yes (CABG, PM, CAD)


Hx Psychiatric Problems:  No


Hx Miscellaneous Medical Probl:  No (OPEN HEART 8 YEARS AGO. )


Hx Alcohol Use:  No


Hx Substance Use:  No


Hx Tobacco Use:  Yes


Smoking Status:  Former smoker





FmHx


Family History:  coronary disease; 


   No diabetes





Physical Exam


Vitals





Vital Signs


  Date      Temp  Pulse  Resp  B/P (MAP)   Pulse Ox  O2          O2 Flow    FiO2


Time                                                 Delivery    Rate


   2/11/19           70    20      127/85        95  Room Air


     10:45                           (99)


   2/11/19  97.5     64    20      124/61        96


     10:25                           (82)





Physical Exam


General: Well developed, well nourished, no acute distress


Head: Normocephalic, atraumatic.


Eyes: Pupils equally reactive, EOM intact


ENT: Moist mucous membranes


Neck: Supple, no lymphadenopathy


Respiratory: Rales at the bases bilaterally


Cardiovascular: RRR, no murmurs, rubs, or gallops


Abdominal: Soft, non-tender, non-distended, no peritoneal signs


: Deferred


MSK: Bilateral lower extremity pitting edema, no unilateral swelling, 5/5 


strength


Neurologic: Alert and oriented, moving all extremities, normal speech, no focal 


weakness, no cerebellar signs


Skin: No rash


Psych: Normal mood


Result Diagram:  


2/11/19 1046                                                                    


           2/11/19 1046





Results 24 hrs





Laboratory Tests


       Test
                                 2/11/19
10:46  2/11/19
13:55


       White Blood Count                      6.7 10^3/ul


       Red Blood Count                       4.86 10^6/ul


       Hemoglobin                               12.0 g/dl


       Hematocrit                                  38.7 %


       Mean Corpuscular Volume                    79.6 fl


       Mean Corpuscular Hemoglobin                24.7 pg


       Mean Corpuscular Hemoglobin
Concent     31.0 g/dl 
  



       Red Cell Distribution Width                 18.9 %


       Platelet Count                         179 10^3/UL


       Mean Platelet Volume                       12.1 fl


       Immature Granulocytes %                    0.300 %


       Neutrophils %                               68.7 %


       Lymphocytes %                               19.9 %


       Monocytes %                                  8.0 %


       Eosinophils %                                1.8 %


       Basophils %                                  1.3 %


       Nucleated Red Blood Cells %            0.0 /100WBC


       Immature Granulocytes #              0.020 10^3/ul


       Neutrophils #                          4.6 10^3/ul


       Lymphocytes #                          1.3 10^3/ul


       Monocytes #                            0.5 10^3/ul


       Eosinophils #                          0.1 10^3/ul


       Basophils #                            0.1 10^3/ul


       Nucleated Red Blood Cells #            0.0 10^3/ul


       Sodium Level                            143 mmol/L


       Potassium Level                         4.3 mmol/L


       Chloride Level                          101 mmol/L


       Carbon Dioxide Level                     28 mmol/L


       Anion Gap                                       14


       Blood Urea Nitrogen                       54 mg/dl


       Creatinine                              2.26 mg/dl


       Est Glomerular Filtrat Rate
mL/min      29 mL/min 
  



       Glucose Level                            171 mg/dl


       Calcium Level                            9.8 mg/dl


       Troponin I                             0.139 ng/ml   Pending


       B-Type Natriuretic Peptide             13486 PG/ML


       Prothrombin Time                                         18.5 Sec


       Prothrombin Time Ratio                                        1.4


       INR International Normalized
Ratio   
                      1.53 



       Activated Partial
Thromboplast Time  
                  42.8 Sec 



       Creatine Kinase                                           58 IU/L


       Creatine Kinase Index                                Pending


       Creatinine Kinase MB (Mass)                          Pending





Current Medications


 Medications
   Dose
          Sig/Cayetano
       Start Time
   Status  Last


 (Trade)       Ordered        Route
 PRN     Stop Time              Admin
Dose


                              Reason                                Admin


 Aspirin
       324 mg         ONCE  ONCE
    2/11/19       DC           2/11/19


(Aspirin)                     PO
            13:00
                       12:55



                                             2/11/19 13:01


 Furosemide
    40 mg          ONCE  ONCE
    2/11/19       DC           2/11/19


(Lasix)                       IV
            13:00
                       12:54



                                             2/11/19 13:01


 Ondansetron    4 mg           ER BRIDGE      2/11/19                



HCl
  (Zofran                 PRN
 IV
       13:30



Inj)                          NAUSEA/VOMITI  2/12/19 13:29


                              NG


                650 mg         ER BRIDGE      2/11/19                



Acetaminophen                 PRN
 PO
       13:30




  (Tylenol                   .MILD PAIN     2/12/19 13:29


Tab)                          1-3 OR TEMP


 IV Flush
      3 ml           PER            2/11/19                



(NS 3 ml)                     PROTOCOL
 IV
  14:00



 Ondansetron    4 mg           Q6H  PRN
      2/11/19                



HCl
  (Zofran                 IV
            14:00



Inj)                          NAUSEA/VOMITI


                              NG


                650 mg         Q6H  PRN
      2/11/19                



Acetaminophen                 PO
 .PAIN 1-3  14:00




  (Tylenol                   OR TEMP


Tab)


                1 tab          Q6H  PRN
      2/11/19                



Acetaminophen                 PO
 .MOD PAIN  14:00



/
                            4-6


Hydrocodone


Bitart



(Norco


(5/325))


 Morphine       2 mg           Q4H  PRN
      2/11/19       UNV      



Sulfate
                      IV
 .SEVERE    14:00



(morphine)                    PAIN 7-10


 Docusate       100 mg         Q12H  PRN
     2/11/19                



Sodium
                       PO
            14:00



(Colace)                      .CONSTIPATION


 Magnesium
     30 ml          DAILY  PRN
    2/11/19                



Hydroxide
                    PO
            14:00



(Milk Of Mag)                 .CONSTIPATION


 Heparin        5,000 unit     Q12
 SC
       2/11/19       UNV      



Sodium
                                      21:00



(Porcine)



(Heparin


(5000



Units/1ml))


 Lorazepam
     0.5 mg         Q6H  PRN
      2/11/19       UNV      



(Ativan)                      IV
 ANXIETY    14:00



 Albuterol/
    3 ml           Q4H RESP       2/11/19                



Ipratropium
                  THERAPY  PRN
  14:00



(Duoneb)                      HHN



                              SHORTNESS OF


                              BREATH


 Hydralazine    10 mg          Q6H  PRN
      2/11/19                



HCl
                          IV
 ELEVATED   14:00



(Apresoline)                  BLOOD


                              PRESSURE


                1 tab          Q5M  PRN
      2/11/19       UNV      



Nitroglycerin                 SL
 ANGINA     14:00







(Nitroglyceri


n
  (Sl Tab)


0.4 Mg)


 Apixaban
      5 mg           BID
 PO
       2/11/19       UNV      



(Eliquis)                                    21:00



 Digoxin
       0.125 mg       DAILY@1300
    2/12/19                



(Digoxin)                     PO
            13:00



 Doxazosin      4 mg           BID
 PO
       2/11/19                



Mesylate
                                    21:00



(Cardura)


                50,000 unit    Q7D
 PO
       2/11/19       UNV      



Ergocalcifero                                14:00



l
  (Drisdol)


 Ferrous        325 mg         BID
 PO
       2/11/19                



Sulfate
                                     21:00



(Ferrous


Sulfate



(Ec))


 Insulin        12 units       QHS
 SC
       2/11/19                



Glargine
                                    21:00



(Lantus)


                40 mg          DAILY
 PO
     2/12/19       UNV      



Miscellaneous                                09:00




 Information


                20 mg          HS
 PO
        2/11/19       UNV      



Miscellaneous                                21:00




 Information


                1 tab          BID
 PO
       2/11/19       UNV      



Miscellaneous                                21:00




 Information


                Discontinue
   ONCE  ONCE
    2/11/19       DC       



Miscellaneous  current oral
  XX
            14:00




              sulfonylur...                 2/11/19 14:03


Information



(*


Miscellaneous



 Pharmacy


Order)


 Diagnostic     1 ea           02
 XX
        2/12/19                



Test
  (Pha)
                                02:00



 (Accu-Chek)


                               ONCE  ONCE
    2/11/19       DC       



Miscellaneous  HYPOGLYCEMIA
  XX
            14:00




              PROTOCOL
                     2/11/19 14:03


Information
   w...


(*


Miscellaneous



 Pharmacy


Order)


 Insulin        NOVOLOG
       Q4
 SC
        2/11/19       UNV      



Aspart
        *MILD*
                       17:00



(Novolog       ALGORI...


Insulin
 Pen)


                Discontinue
   ONCE  ONCE
    2/11/19       DC       



Miscellaneous  all
 previ...  XX
            14:00




                                            2/11/19 14:03


Information



(*


Miscellaneous



 Pharmacy


Order)


 Furosemide
    40 mg          DAILY
 IV
     2/12/19       UNV      



(Lasix)                                      09:00



                1 ea           NOTE
 XX
      2/11/19                



Miscellaneous                                14:30




 Information


 Glucose
       15 gm          Q15M  PRN
     2/11/19                



(Glutose)                     PO
 DECREASED  14:30



                              GLUCOSE


 Glucose
       22.5 gm        Q15M  PRN
     2/11/19                



(Glutose)                     PO
 DECREASED  14:30



                              GLUCOSE


 Dextrose
      25 ml          Q15M  PRN
     2/11/19                



(D50w                         IV
 DECREASED  14:30



Syringe)                      GLUCOSE


 Dextrose
      50 ml          Q15M  PRN
     2/11/19                



(D50w                         IV
 DECREASED  14:30



Syringe)                      GLUCOSE


 Glucagon
      1 mg           Q15M  PRN
     2/11/19                



(Glucagen)                    IM
 DECREASED  14:30



                              GLUCOSE


 Glucose
       15 gm          Q15M  PRN
     2/11/19                



(Glutose)                     BUCCAL
        14:30



                              DECREASED


                              GLUCOSE








Procedures/MDM


EKG, MONITORS, & DIAGNOSTIC IMAGING:


 


EKG: I reviewed and interpreted a 12-lead EKG. 


Rhythm: Atrial fibrillation, rate controlled, left bundle branch block


ST Changes: No contiguous ST segment elevations


T waves: No contiguous T wave inversions


Impression: Abnormal EKG





Repeat EKG:


EKG: I reviewed and interpreted a 12-lead EKG. 


Rhythm: Atrial fibrillation, rate controlled, left bundle branch block


ST Changes: No contiguous ST segment elevations


T waves: No contiguous T wave inversions


Impression: Abnormal EKG





Chest x-ray: I reviewed and interpreted a 1 view of the chest


Mediastinum: No enlargement


Cardiac silhouette: cardiomegaly


Airspace: Bilateral pulmonary edema


Bones: No evidence of fracture








PROCEDURES:


[None]





LAB INTERPRETATION:


* Elevated troponin and BNP





MEDICAL DECISION MAKING:


The patient's history, physical exam and clinical presentation is concerning for


possible cardiogenic etiology and acute coronary syndrome. 





Based on the patient's clinical exam and history and risk factors, I have a much


lower clinical concern for pulmonary embolism, acute aortic dissection, 


pneumothorax, pneumonia, cardiac tamponade





HEART Score: Greater than 4


MACE Rate: Greater than 16.6%





Shared Decision Making: We had a conversation regarding risk stratification, 


MACE rate, and the risks, benefits, alternatives of disposition planning 


options.





Disposition planning: Admit





ER COURSE:


* Aspirin, Lasix provided.  Chest pain-free.


* Dr. Gates, on-call for primary cardiologist notified.  He agrees with plan 


  of care and will consult





CONSULTATION:


Cardiology as above





DISPOSITION PLAN:


Accepting care team and consultations:


I discussed the current laboratory data, diagnostic imaging and emergency care 


provided.


Admitting team: Panel


Admitting team indication: Insurance directed





Departure


Diagnosis:  


   Primary Impression:  


   Chest pain


   Chest pain type:  unspecified  Qualified Codes:  R07.9 - Chest pain, unspe


   cified


   Additional Impressions:  


   CHF (congestive heart failure)


   Heart failure type:  combined systolic and diastolic  Heart failure 


   chronicity:  acute on chronic  Qualified Codes:  I50.43 - Acute on chronic 


   combined systolic (congestive) and diastolic (congestive) heart failure


   NSTEMI (non-ST elevated myocardial infarction)


   Chronic renal insufficiency


   Chronic kidney disease stage:  unspecified stage  Qualified Codes:  N18.9 - 


   Chronic kidney disease, unspecified


Condition:  Stable











NAM JEREZ MD          Feb 11, 2019 14:34

## 2019-02-11 NOTE — CONS
DATE OF ADMISSION: 02/11/2019

DATE OF CONSULTATION:  02/11/2019

 

 

 

REASON FOR CONSULTATION:  Congestive heart failure exacerbation.

 

REQUESTING PHYSICIAN:  Dr. Walters.

 

HISTORY OF PRESENT ILLNESS:  Mr. Lei is a very pleasant 68-year-old male well-known to myself as
 a primary office patient with a history of cardiomyopathy with decreased left ventricular ejection f
raction last approximately 30% by echo in 12/2018 with associated mild aortic regurgitation and moder
ate tricuspid regurgitation, recurrent bouts of systolic congestive heart failure, ICD, chronic kidne
y disease, dyslipidemia, and ____ during last admission who had presented to his primary electrophysi
ologist's office and was noted to have worsening lower extremity edema, shortness of breath and ortho
pnea.  Therefore, he have been sent to the emergency department here at [facility].
  Upon arrival, temperature is 97.5, blood pressure is 124/61, pulse 60, respiration 20, saturating 9
6%.  The patient's labs revealed a white count of 6.7, hemoglobin 12.0, and platelet count of 179.  S
odium of 143, potassium 4.3, creatinine 2.2 and BUN 54.  Troponin is positive at  0.139, BNP of 19,30
0, and INR of 1.53.  The patient underwent a chest x-ray revealing cardiomegaly and failure.  The pat
ient's electrocardiogram revealed atrial fibrillation, rate of 71 with a nonspecific IVCD, secondary 
repolarization abnormalities.  The patient subsequently has been treated with aspirin dose and Lasix 
and now awaits admit to the floor.

 

PAST MEDICAL HISTORY:  As above in the HPI.

 

MEDICATIONS CURRENTLY IN THE HOSPITAL:

1.  Digoxin 0.125 mg daily.

2.  Lasix 40 mg IV daily.

3.  Heparin 5000 subQ 12.

4.  Eliquis 5 mg p.o. b.i.d.

5.  Cardura 4 mg.  p.o. b.i.d.

6.  Lantus.

7.  Nicotine patch.

8.  Morphine p.r.n.

9.  Tylenol p.r.n.

10.  Vitamin D.

 

MEDICATIONS PRIOR TO ADMIT:

1.  Norvasc 5 mg a day.

2.  Digoxin 0.125 mg daily.

3.  Cardura 4 mg daily.

4.  Toprol-XL 25 daily.

5.  Crestor 20 mg at bedtime.

6.  Entresto, unclear if patient was re-taking this again.

7.  Eliquis 5 mg b.i.d.

8.  Ferrous sulfate.

9.  Nexium.

10.  Insulin.

11.  Vitamin D.

 

ALLERGIES:  NO KNOWN DRUG ALLERGIES.

 

SOCIAL HISTORY:  No current tobacco, ETOH or illicit drug use.

 

FAMILY HISTORY:  No history of sudden cardiac death or early CAD.

 

REVIEW OF SYSTEMS:  As above in the HPI.

CONSTITUTIONAL:  No fevers or chills.

PULMONARY:  Shortness of breath.

CARDIOVASCULAR:  Congestive heart failure.

GASTROINTESTINAL:  No vomiting.

GENITOURINARY:  No hematuria.

MUSCULOSKELETAL:  Degenerative joint disease.

PSYCHIATRIC:  The patient denies depression.

NEUROLOGIC:  No documented history of CVA.

ENDOCRINE:  Diabetes mellitus.

 

PHYSICAL EXAMINATION:

VITAL SIGNS:  Temperature of 97.5, blood pressure 118/60, pulse 60, respirations 18, saturation 95%.

GENERAL:  The patient is alert, awake, in no acute distress.

NECK:  JVP approximately 8 to 10 cm water.

CHEST:  decreased breath sounds at the bases bilaterally.

HEART:  Irregularly irregular, I/VI systolic murmur with nondisplaced PMI.

ABDOMEN:  Positive bowel sounds, soft.

EXTREMITIES:  2+ edema.  Somewhat difficult to palpate distal pulses of bilateral posterior tibial an
d dorsalis pedis.

 

LABORATORY DATA:  As above in the HPI with a repeat troponin mildly trended up to 0.16.

 

IMAGING STUDIES:  As above in the HPI with chest x-ray showing cardiomegaly with failure.

 

ECG:  As above in the HPI.  No further electrocardiograms for my review at this time.

 

RECOMMENDATIONS :

1.  At this time, would admit patient to telemetry monitoring to follow rhythm and rates closely.

2.  We will continue to trend the patient's cardiac enzymes and assess for any significant ongoing ca
rdiac damage.

3.  Would continue the patient's Lasix diuresis and consider increasing to b.i.d. in order to improve
 diuresis.

4.  We will continue the patient's baseline Eliquis at this time to prevent thromboembolic events in 
the setting of atrial fibrillation and would resume the patient's baseline beta blocker and we will h
old on the patient's Entresto at this time in the setting of acute on chronic renal failure.  We will
 give patient a low-dose hydralazine afterload reduction in lieu of this at this time.

5.  The patient is status post 2D echo in December revealing an ejection fraction of 30%.

6.  Follow the patient's blood sugars closely.

7.  Check a digoxin level to rule out any possible accumulation in the setting of renal failure.

 

Thank you for allowing me to take part in the care of this patient.  I will continue to follow him cl
osely with you with further recommendations to be made as the patient progresses through inpatient [facility] clinical course.

 

 

Dictated By: ROSANNE THOMAS/KEISHA

DD:    02/11/2019 16:35:44

DT:    02/11/2019 17:43:44

Conf#: [id number]

DID#:  [id number]

CC: JOSE WALTERS;*EndCC*

## 2019-02-11 NOTE — HP
DATE OF ADMISSION: 02/11/2019

 

IDENTIFICATION:  This is a 68-year-old male.

 

CHIEF COMPLAINT:  Shortness of breath and leg swelling.

 

HISTORY OF PRESENT ILLNESS:  A 68-year-old male with past medical history of coronary artery disease,
 prior CABG, CHF with AICD placement, smoking history, atrial fibrillation, cardiomyopathy, high chol
esterol, CKD, type 2 diabetes and hypertension, who has complaints of chest pressure, shortness of br
eath and leg swelling.  The patient went to his primary cardiologist earlier today with the same comp
laints.  He has also been having some dyspnea on exertion and PND and also again some mild chest pres
sure.  Apparently, he has been taking oral diuretic at home that did not help the symptoms.  When he 
arrived to the outpatient cardiologist, he was told, because of his symptoms, to come to the ER where
 he has come now.  He was given a dose of Lasix IV in the ER as well as high-dose aspirin.  He was fo
und with BNP elevated at 19,300.  Also, his first troponin was elevated 0.139 and his creatinine was 
more elevated than before, today is 2.26.  The patient was last here at our hospital from 12/07/2018 
to 12/13/2018, so 2 months ago.  At that time, he was treated for acute CHF exacerbation as well as c
ommunity-acquired pneumonia.

 

PAST MEDICAL HISTORY:  As stated above.

 

ALLERGIES:  NO KNOWN DRUG ALLERGIES.

 

HOME MEDICATIONS:

1.  Eliquis 5 mg b.i.d.

2.  Iron sulfate 325 mg b.i.d.

3.  Norvasc 5 mg daily.

4.  Digoxin 0.125 mg daily.

5.  Doxazosin 4 mg b.i.d.

6.  Toprol-XL 25 mg daily.

7.  Crestor 20 mg at bedtime.

8.  Entresto 97/103 one tab b.i.d.

9.  Lasix 40 mg daily.

10.  Nexium 40 mg daily.

11.  Sliding scale insulin.

12.  Aspart at home.

13.  Lantus 12 units subcutaneously at bedtime.

14.  Vitamin D2 50,000 units q. weekly.

 

PAST SURGICAL HISTORY:  Again, he has had coronary artery bypass grafting performed and AICD placemen
t as well.

 

SOCIAL HISTORY:  Negative for IV drug abuse, negative for drinking, but positive smoking history.

 

FAMILY HISTORY:  Noncontributory.

 

PHYSICAL EXAMINATION:

VITAL SIGNS:  Today, T-max 98.0, pulse of 63 to 70, respirations 18 to 20, blood pressure was 118/63,
 saturating at 97% room air.

GENERAL:  The patient is sitting on the edge of bed, wife is at the bedside, in no acute distress.

HEENT:  Pupils equal, round and reactive to light.  Extraocular muscles are intact.

NECK:  Supple, no thyromegaly.

LUNGS:  Distant breath sounds bilaterally, mild crackles heard at the bases.

CARDIOVASCULAR:  Irregularly irregular heart rate.  No rubs or gallops.

ABDOMEN:  Soft, nontender and nondistended.  Normal bowel sounds.  No rebound or guarding.

MUSCULOSKELETAL:  2+ pitting edema in bilateral lower extremities to the mid calves.

NEUROLOGIC:  No focal deficits.

 

DIAGNOSTIC DATA:  CBC is normal.  The sodium is 143, potassium 4.3, chloride 101, CO2 28, BUN of 54, 
creatinine 2.26, glucose of 171.  First troponin is 0.139.  Coags show an INR of 1.53.  He had a ches
t x-ray today that shows cardiomegaly with failure.

 

ASSESSMENT AND PLAN:  A 68-year-old male coming in with chest pressure, shortness of breath, lower ex
tremity swelling, signs of congestive heart failure exacerbation, also rule out for acute coronary sy
ndrome.

1.  Shortness of breath and chest pressure.  Again, symptoms are likely secondary to congestive heart
 failure exacerbation.  The patient has extensive cardiac history as mentioned in the HPI, so admit t
he patient; check TSH, A1c and lipid panel; and get PT and OT consults.  We will also diurese the pat
ient, keep the head of the bed greater than 30 degrees, follow up Cardiology recommendations and cont
inue digoxin for now.

2.  History of atrial fibrillation.  Continue to monitor heart rate.  Continue Eliquis as well.

3.  Smoking history.  Counseled on cessation.  Order nicotine patch.

4.  History of hypertension.  Again, see above.  Continue current medications.  Also hydralazine p.r.
n.

5.  History of chronic kidney disease.  Again, the kidney function appears to be slightly worse than 
when he was here 2 months ago, so we will go ahead and obtain renal consult and follow up their recom
mendations.  Monitor BUN and creatinine levels and urine output.

6.  Type 2 diabetes.  Follow up A1c.  Continue Lantus and add sliding scale insulin.

7.  Deep venous thrombosis prophylaxis.  He is on Eliquis.

 

 

Dictated By: GORDO BAUTISTA

DD:    02/11/2019 16:36:42

DT:    02/11/2019 17:02:27

Conf#: [id number]

DID#:  [id number]

## 2019-02-12 VITALS — SYSTOLIC BLOOD PRESSURE: 132 MMHG | DIASTOLIC BLOOD PRESSURE: 71 MMHG | RESPIRATION RATE: 19 BRPM | HEART RATE: 55 BPM

## 2019-02-12 VITALS — DIASTOLIC BLOOD PRESSURE: 65 MMHG | RESPIRATION RATE: 18 BRPM | HEART RATE: 77 BPM | SYSTOLIC BLOOD PRESSURE: 140 MMHG

## 2019-02-12 VITALS — DIASTOLIC BLOOD PRESSURE: 79 MMHG | HEART RATE: 58 BPM | SYSTOLIC BLOOD PRESSURE: 124 MMHG | RESPIRATION RATE: 19 BRPM

## 2019-02-12 VITALS — SYSTOLIC BLOOD PRESSURE: 118 MMHG | HEART RATE: 59 BPM | RESPIRATION RATE: 20 BRPM | DIASTOLIC BLOOD PRESSURE: 71 MMHG

## 2019-02-12 VITALS — DIASTOLIC BLOOD PRESSURE: 63 MMHG | RESPIRATION RATE: 20 BRPM | SYSTOLIC BLOOD PRESSURE: 116 MMHG | HEART RATE: 61 BPM

## 2019-02-12 VITALS — RESPIRATION RATE: 18 BRPM | HEART RATE: 53 BPM | SYSTOLIC BLOOD PRESSURE: 99 MMHG | DIASTOLIC BLOOD PRESSURE: 55 MMHG

## 2019-02-12 VITALS — HEART RATE: 75 BPM

## 2019-02-12 VITALS — HEART RATE: 66 BPM

## 2019-02-12 VITALS — HEART RATE: 60 BPM

## 2019-02-12 VITALS — HEART RATE: 71 BPM

## 2019-02-12 VITALS — HEART RATE: 47 BPM

## 2019-02-12 LAB
ABNORMAL IP MESSAGE: 1
ADD MAN DIFF?: NO
ANION GAP: 5 (ref 5–13)
BASOPHIL #: 0.1 10^3/UL (ref 0–0.1)
BASOPHILS %: 1.3 % (ref 0–2)
BLOOD UREA NITROGEN: 54 MG/DL (ref 7–20)
CALCIUM: 9.2 MG/DL (ref 8.4–10.2)
CARBON DIOXIDE: 33 MMOL/L (ref 21–31)
CHLORIDE: 105 MMOL/L (ref 97–110)
CHOL/HDL RATIO: 2.1 RATIO
CHOLESTEROL: 90 MG/DL (ref 100–200)
CREATININE: 2.27 MG/DL (ref 0.61–1.24)
DIGOXIN: 1.5 NG/ML (ref 1–2)
EOSINOPHILS #: 0.2 10^3/UL (ref 0–0.5)
EOSINOPHILS %: 3.1 % (ref 0–7)
GLUCOSE: 101 MG/DL (ref 70–220)
HDL CHOLESTEROL: 41 MG/DL (ref 30–78)
HEMATOCRIT: 35.1 % (ref 42–52)
HEMOGLOBIN A1C: 9 % (ref 0–5.9)
HEMOGLOBIN: 10.9 G/DL (ref 14–18)
IMMATURE GRANS #M: 0.02 10^3/UL (ref 0–0.03)
IMMATURE GRANS % (M): 0.4 % (ref 0–0.43)
LDL CHOLESTEROL,CALCULATED: 31 MG/DL
LYMPHOCYTES #: 1.6 10^3/UL (ref 0.8–2.9)
LYMPHOCYTES %: 28.7 % (ref 15–51)
MAGNESIUM: 2 MG/DL (ref 1.7–2.5)
MEAN CORPUSCULAR HEMOGLOBIN: 24.7 PG (ref 29–33)
MEAN CORPUSCULAR HGB CONC: 31.1 G/DL (ref 32–37)
MEAN CORPUSCULAR VOLUME: 79.4 FL (ref 82–101)
MEAN PLATELET VOLUME: 13.4 FL (ref 7.4–10.4)
MONOCYTE #: 0.6 10^3/UL (ref 0.3–0.9)
MONOCYTES %: 10.8 % (ref 0–11)
NEUTROPHIL #: 3.1 10^3/UL (ref 1.6–7.5)
NEUTROPHILS %: 55.7 % (ref 39–77)
NUCLEATED RED BLOOD CELLS #: 0 10^3/UL (ref 0–0)
NUCLEATED RED BLOOD CELLS%: 0 /100WBC (ref 0–0)
PHOSPHORUS: 3.9 MG/DL (ref 2.5–4.9)
PLATELET COUNT: 178 10^3/UL (ref 140–415)
POSITIVE DIFF: (no result)
POTASSIUM: 3.9 MMOL/L (ref 3.5–5.1)
RED BLOOD COUNT: 4.42 10^6/UL (ref 4.7–6.1)
RED CELL DISTRIBUTION WIDTH: 18.5 % (ref 11.5–14.5)
SODIUM: 143 MMOL/L (ref 135–144)
THYROID STIMULATING HORMONE: 5.51 MIU/L (ref 0.47–4.68)
TRIGLYCERIDES: 90 MG/DL (ref 0–149)
TROPONIN-I: 0.17 NG/ML (ref 0–0.12)
TROPONIN-I: 0.17 NG/ML (ref 0–0.12)
WHITE BLOOD COUNT: 5.5 10^3/UL (ref 4.8–10.8)

## 2019-02-12 RX ADMIN — ATORVASTATIN CALCIUM SCH MG: 80 TABLET, FILM COATED ORAL at 20:26

## 2019-02-12 RX ADMIN — INSULIN ASPART 1 UNIT: 100 INJECTION, SOLUTION INTRAVENOUS; SUBCUTANEOUS at 04:37

## 2019-02-12 RX ADMIN — METOPROLOL SUCCINATE 1 MG: 25 TABLET, EXTENDED RELEASE ORAL at 08:08

## 2019-02-12 RX ADMIN — FAMOTIDINE 1 MG: 20 TABLET ORAL at 20:26

## 2019-02-12 RX ADMIN — APIXABAN SCH MG: 5 TABLET, FILM COATED ORAL at 20:26

## 2019-02-12 RX ADMIN — ATORVASTATIN CALCIUM 1 MG: 80 TABLET, FILM COATED ORAL at 20:26

## 2019-02-12 RX ADMIN — INSULIN ASPART 1 UNIT: 100 INJECTION, SOLUTION INTRAVENOUS; SUBCUTANEOUS at 12:01

## 2019-02-12 RX ADMIN — HYDROCODONE BITARTRATE AND ACETAMINOPHEN PRN TAB: 5; 325 TABLET ORAL at 13:01

## 2019-02-12 RX ADMIN — INSULIN ASPART 1 UNIT: 100 INJECTION, SOLUTION INTRAVENOUS; SUBCUTANEOUS at 17:15

## 2019-02-12 RX ADMIN — INSULIN ASPART 1 UNIT: 100 INJECTION, SOLUTION INTRAVENOUS; SUBCUTANEOUS at 20:27

## 2019-02-12 RX ADMIN — ONDANSETRON HYDROCHLORIDE 1 MG: 2 INJECTION, SOLUTION INTRAMUSCULAR; INTRAVENOUS at 21:20

## 2019-02-12 RX ADMIN — DOXAZOSIN 1 MG: 4 TABLET ORAL at 08:07

## 2019-02-12 RX ADMIN — FAMOTIDINE SCH MG: 20 TABLET ORAL at 20:26

## 2019-02-12 RX ADMIN — FERROUS SULFATE TAB 325 MG (65 MG ELEMENTAL FE) SCH MG: 325 (65 FE) TAB at 20:26

## 2019-02-12 RX ADMIN — HYDROCODONE BITARTRATE AND ACETAMINOPHEN 1 TAB: 5; 325 TABLET ORAL at 13:01

## 2019-02-12 RX ADMIN — APIXABAN 1 MG: 5 TABLET, FILM COATED ORAL at 20:26

## 2019-02-12 RX ADMIN — METOPROLOL SUCCINATE SCH MG: 25 TABLET, EXTENDED RELEASE ORAL at 08:08

## 2019-02-12 RX ADMIN — INSULIN ASPART 1 UNIT: 100 INJECTION, SOLUTION INTRAVENOUS; SUBCUTANEOUS at 08:05

## 2019-02-12 RX ADMIN — NICOTINE 1 PATCH: 21 PATCH, EXTENDED RELEASE TRANSDERMAL at 08:10

## 2019-02-12 RX ADMIN — DOXAZOSIN 1 MG: 4 TABLET ORAL at 20:27

## 2019-02-12 RX ADMIN — FAMOTIDINE SCH MG: 20 TABLET ORAL at 08:08

## 2019-02-12 RX ADMIN — INSULIN ASPART 1 UNIT: 100 INJECTION, SOLUTION INTRAVENOUS; SUBCUTANEOUS at 01:01

## 2019-02-12 RX ADMIN — NICOTINE SCH PATCH: 21 PATCH, EXTENDED RELEASE TRANSDERMAL at 08:10

## 2019-02-12 RX ADMIN — FERROUS SULFATE TAB 325 MG (65 MG ELEMENTAL FE) 1 MG: 325 (65 FE) TAB at 20:26

## 2019-02-12 RX ADMIN — DIGOXIN 1 MG: 125 TABLET ORAL at 12:42

## 2019-02-12 RX ADMIN — FERROUS SULFATE TAB 325 MG (65 MG ELEMENTAL FE) SCH MG: 325 (65 FE) TAB at 08:08

## 2019-02-12 RX ADMIN — INSULIN GLARGINE SCH UNITS: 100 INJECTION, SOLUTION SUBCUTANEOUS at 20:32

## 2019-02-12 RX ADMIN — FUROSEMIDE 1 MG: 10 INJECTION, SOLUTION INTRAVENOUS at 20:26

## 2019-02-12 RX ADMIN — FUROSEMIDE 1 MG: 10 INJECTION, SOLUTION INTRAVENOUS at 08:08

## 2019-02-12 RX ADMIN — FAMOTIDINE 1 MG: 20 TABLET ORAL at 08:08

## 2019-02-12 RX ADMIN — DEXAMETHASONE SODIUM PHOSPHATE PRN MG: 10 INJECTION, SOLUTION INTRAMUSCULAR; INTRAVENOUS at 21:20

## 2019-02-12 RX ADMIN — MAGNESIUM HYDROXIDE 1 ML: 400 SUSPENSION ORAL at 18:09

## 2019-02-12 RX ADMIN — FERROUS SULFATE TAB 325 MG (65 MG ELEMENTAL FE) 1 MG: 325 (65 FE) TAB at 08:08

## 2019-02-12 RX ADMIN — INSULIN GLARGINE 1 UNITS: 100 INJECTION, SOLUTION SUBCUTANEOUS at 20:32

## 2019-02-12 RX ADMIN — DOXAZOSIN SCH MG: 4 TABLET ORAL at 20:27

## 2019-02-12 RX ADMIN — DOXAZOSIN SCH MG: 4 TABLET ORAL at 08:07

## 2019-02-12 RX ADMIN — DIGOXIN SCH MG: 125 TABLET ORAL at 12:42

## 2019-02-12 RX ADMIN — APIXABAN 1 MG: 5 TABLET, FILM COATED ORAL at 08:07

## 2019-02-12 RX ADMIN — APIXABAN SCH MG: 5 TABLET, FILM COATED ORAL at 08:07

## 2019-02-12 NOTE — CONS
Consult Date/Type/Reason


Admit Date/Time


Feb 11, 2019 at 13:13


Initial Consult Date





Date/Time of Note


DATE: 2/12/19 


TIME: 10:28





Subjective


No acute events - improved fluidsatus - con't diuresis as Cr allows - better 


now. 





ROS: No fever, no chills, no nausea, no vomiting, no diarrhea/constipation


        No recent weight changes


        No chest pain, no PND, no orthopnea - improved SOB. 


        No dizziness, blurred vision


        No thirst, no heat or cold intolerance





Objective


Vitals





Vital Signs


  Date      Temp  Pulse  Resp  B/P (MAP)   Pulse Ox  O2          O2 Flow    FiO2


Time                                                 Delivery    Rate


   2/12/19           71


     08:00


   2/12/19  97.5           20      116/63        91


     07:22                           (80)


   2/11/19                                           Room Air


     15:50








Intake and Output





2/11/19 2/11/19 2/12/19





1515:00


23:00


07:00





IntakeIntake Total


220 ml


120 ml





OutputOutput Total


600 ml


400 ml





BalanceBalance


-380 ml


-280 ml











Exam


General: WN/WD/NAD, AOx 3


HEENT: Unicetric/atraumatic/EOMI ( follow commands)


NECK: JVD elevated, no thyromegaly, JVD 78 cm 


Lymph: no lymphadenopathy


HEART: regular with no S3, II/VI systolic murmur at apex, PMI L , ICD 


LUNGS: Coarse sounds


ABD: soft, NT, ND, +BS


: Intact


Neuro: non focal


SKIN: chronic changes


EXT: 1-2+ edema





Results/Medications


Result Diagram:  


2/12/19 0653                                                                    


           2/12/19 0653





Results 24 hrs





Laboratory Tests


Test
                 2/11/19
10:46  2/11/19
13:55  2/11/19
17:58  2/11/19
18:58


White Blood Count            6.7  #


Red Blood Count              4.86


Hemoglobin                  12.0  L


Hematocrit                  38.7  L


Mean Corpuscular            79.6  L


Volume


Mean Corpuscular            24.7  L


Hemoglobin


Mean Corpuscular           31.0  L
  
              
              



Hemoglobin
Concent


Red Cell                    18.9  H


Distribution Width


Platelet Count               179  #


Mean Platelet Volume        12.1  H


Immature                    0.300


Granulocytes %


Neutrophils %                68.7


Lymphocytes %                19.9


Monocytes %                   8.0


Eosinophils %                 1.8


Basophils %                   1.3


Nucleated Red Blood           0.0


Cells %


Immature                    0.020


Granulocytes #


Neutrophils #                 4.6


Lymphocytes #                 1.3


Monocytes #                   0.5


Eosinophils #                 0.1


Basophils #                   0.1


Nucleated Red Blood           0.0


Cells #


Sodium Level                  143


Potassium Level               4.3


Chloride Level                101


Carbon Dioxide Level           28


Anion Gap                     14  H


Blood Urea Nitrogen           54  H


Creatinine                  2.26  H


Est Glomerular               29  L
  
              
              



Filtrat Rate
mL/min


Glucose Level                 171


Calcium Level                 9.8


Troponin I                0.139  *H      0.160  *H                     0.198  *H


B-Type Natriuretic         89799  H


Peptide


Prothrombin Time                           18.5  H


Prothrombin Time                             1.4


Ratio


INR International     
                    1.53  
  
              



Normalized
Ratio


Activated             
                   42.8  H
  
              



Partial
Thromboplast


Time


Creatine Kinase                               58                            64


Creatine Kinase                              3.1                           3.8


Index


Creatinine Kinase MB                        1.82                         2.45  H


(Mass)


Free Thyroxine                              2.16


Bedside Glucose                                             143


Test
                 2/11/19
21:00  2/11/19
21:36  2/12/19
00:49  2/12/19
02:33


Urine Color           YELLOW


Urine Clarity         CLEAR


Urine pH                      5.0


Urine Specific              1.009


Gravity


Urine Ketones         NEGATIVE


Urine Nitrite         NEGATIVE


Urine Bilirubin       NEGATIVE


Urine Urobilinogen    NEGATIVE


Urine Leukocyte       NEGATIVE


Esterase


Urine Eosinophils %           0.0


Urine Hemoglobin      NEGATIVE


Urine Random Sodium            89


Urine Glucose         NEGATIVE


Urine Total Protein   NEGATIVE


Bedside Glucose                             297  H          145


Troponin I                                                             0.171  *H


Test
                 2/12/19
04:34  2/12/19
06:53  2/12/19
08:05  



Bedside Glucose               122                            98


White Blood Count                            5.5


Red Blood Count                            4.42  L


Hemoglobin                                 10.9  L


Hematocrit                                 35.1  L


Mean Corpuscular                           79.4  L


Volume


Mean Corpuscular                           24.7  L


Hemoglobin


Mean Corpuscular      
                   31.1  L
  
              



Hemoglobin
Concent


Red Cell                                   18.5  H


Distribution Width


Platelet Count                               178


Mean Platelet Volume                       13.4  H


Immature                                   0.400


Granulocytes %


Neutrophils %                               55.7


Lymphocytes %                               28.7


Monocytes %                                 10.8


Eosinophils %                                3.1


Basophils %                                  1.3


Nucleated Red Blood                          0.0


Cells %


Immature                                   0.020


Granulocytes #


Neutrophils #                                3.1


Lymphocytes #                                1.6


Monocytes #                                  0.6


Eosinophils #                                0.2


Basophils #                                  0.1


Nucleated Red Blood                          0.0


Cells #


Sodium Level                                 143


Potassium Level                              3.9


Chloride Level                               105


Carbon Dioxide Level                         33  H


Anion Gap                                     5  #


Blood Urea Nitrogen                          54  H


Creatinine                                 2.27  H


Est Glomerular        
                     29  L
  
              



Filtrat Rate
mL/min


Glucose Level                               101  #


Hemoglobin A1c                              9.0  H


Calcium Level                                9.2


Phosphorus Level                             3.9


Magnesium Level                              2.0


Troponin I                               0.166  *H


Triglycerides Level                           90


Cholesterol Level                            90  L


LDL Cholesterol,                              31


Calculated


HDL Cholesterol                               41


Cholesterol/HDL                              2.1


Ratio


Thyroid Stimulating   
                  5.510  H
  
              



Hormone
(TSH)





Home Meds


Reported Medications


Metoprolol Succinate* (Toprol XL*) 25 Mg Tab.sr.24h, 25 MG PO DAILY, #30 TAB


   2/11/19


Sacubitril/Valsartan (Entresto 97 mg-103 mg Tablet) 1 Each Tablet, 1 TAB PO BID


   12/7/18


Digoxin* (Digox*) 125 Mcg Tablet, 0.125 MG PO DAILY, TAB


   12/7/18


Apixaban* (Eliquis*) 5 Mg Tablet, 5 MG PO BID, TAB


   3/14/16


Insulin Aspart* (Novolog Insulin Vial*) 100 U/Ml Vial, 0 SC SLIDING SCALE AC, 


VIAL


   3/14/16


Insulin Glargine* (Lantus*) 100 Unit/Ml Soln, 12 UNIT SC QHS, #1 VIAL


   3/14/16


Esomeprazole Mag Trihydrate (Nexium) 40 Mg Capsule.dr, 40 MG PO DAILY, #30 CAP


   3/14/16


Furosemide* (Furosemide*) 40 Mg Tablet, 40 MG PO DAILY, TAB


   3/14/16


Amlodipine Besylate* (Norvasc*) 5 Mg Tablet, 5 MG PO DAILY, TAB


   9/15/15


Rosuvastatin Calcium* (Crestor*) 20 Mg Tablet, 20 MG PO HS, TAB


   9/15/15


Ergocalciferol* (Drisdol* (Vitamin D2)) 50,000 Unit Capsule, 67191 UNIT PO Q7D, 


CAP


    MONDAYS


   9/15/15


Doxazosin Mesylate* (Doxazosin Mesylate*) 4 Mg Tablet, 4 MG PO BID, TAB


   3/13/15


Ferrous Sulfate* (Ferrous Sulfate*) 325 Mg Tabec, 325 MG PO BID, TAB


   3/13/15


Discontinued Reported Medications


Metoprolol Succinate* (Toprol XL*) 50 Mg Tab.er.24h, 50 MG PO DAILY, #30 TAB


   3/14/16


Valsartan* (Diovan*) 160 Mg Tablet, 160 MG PO DAILY, TAB


   9/15/15


Discontinued Scripts


Levofloxacin* (Levaquin*) 750 Mg Tablet, 750 MG PO Q48H, #3 TAB


   Prov:ROSANNE HERNÁNDEZ MD         12/13/18


Medications





Current Medications


IV Flush (NS 3 ml) 3 ml PER PROTOCOL IV ;  Start 2/11/19 at 14:00


Ondansetron HCl (Zofran Inj) 4 mg Q6H  PRN IV NAUSEA/VOMITING;  Start 2/11/19 at


14:00


Acetaminophen (Tylenol Tab) 650 mg Q6H  PRN PO .PAIN 1-3 OR TEMP Last 


administered on 2/11/19at 22:02; Admin Dose 650 MG;  Start 2/11/19 at 14:00


Acetaminophen/ Hydrocodone Bitart (Norco (5/325)) 1 tab Q6H  PRN PO .MOD PAIN 4-


6 Last administered on 2/11/19at 22:58; Admin Dose 1 TAB;  Start 2/11/19 at 


14:00


Morphine Sulfate (morphine) 6 mg Q4H  PRN PO .SEVERE PAIN 7-10;  Start 2/11/19 


at 14:00


Docusate Sodium (Colace) 100 mg Q12H  PRN PO .CONSTIPATION;  Start 2/11/19 at 


14:00


Magnesium Hydroxide (Milk Of Mag) 30 ml DAILY  PRN PO .CONSTIPATION;  Start 


2/11/19 at 14:00


Lorazepam (Ativan) 0.5 mg Q6H  PRN PO ANXIETY;  Start 2/11/19 at 14:00


Albuterol/ Ipratropium (Duoneb) 3 ml Q4H RESP THERAPY  PRN HHN SHORTNESS OF 


BREATH;  Start 2/11/19 at 14:00


Hydralazine HCl (Apresoline) 10 mg Q6H  PRN IV ELEVATED BLOOD PRESSURE;  Start 


2/11/19 at 14:00


Nitroglycerin (Nitroglycerin (Sl Tab) 0.4 Mg) 1 tab Q5M  PRN SL ANGINA;  Start 


2/11/19 at 14:00


Apixaban (Eliquis) 5 mg BID PO  Last administered on 2/12/19at 08:07; Admin Dose


5 MG;  Start 2/11/19 at 21:00


Digoxin (Digoxin) 0.125 mg DAILY@1300 PO ;  Start 2/12/19 at 13:00


Doxazosin Mesylate (Cardura) 4 mg BID PO  Last administered on 2/12/19at 08:07; 


Admin Dose 4 MG;  Start 2/11/19 at 21:00


Ergocalciferol (Drisdol) 50,000 unit Q7D PO  Last administered on 2/11/19at 


18:03; Admin Dose 50,000 UNIT;  Start 2/11/19 at 14:00


Ferrous Sulfate (Ferrous Sulfate (Ec)) 325 mg BID PO  Last administered on 


2/12/19at 08:08; Admin Dose 325 MG;  Start 2/11/19 at 21:00


Insulin Glargine (Lantus) 12 units QHS SC  Last administered on 2/11/19at 21:46;


Admin Dose 12 UNITS;  Start 2/11/19 at 21:00


Famotidine (Pepcid) 20 mg DAILY PO  Last administered on 2/12/19at 08:08; Admin 


Dose 20 MG;  Start 2/12/19 at 09:00


Atorvastatin Calcium (Lipitor) 80 mg HS PO  Last administered on 2/11/19at 


20:53; Admin Dose 80 MG;  Start 2/11/19 at 21:00


Diagnostic Test (Pha) (Accu-Chek) 1 ea 02 XX ;  Start 2/12/19 at 02:00


Insulin Aspart (Novolog Insulin Pen) NOVOLOG *MILD* ALGORI... Q4 SC  Last 


administered on 2/12/19at 01:01; Admin Dose 1 UNIT;  Start 2/11/19 at 17:00


Miscellaneous Information 1 ea NOTE XX ;  Start 2/11/19 at 14:30


Glucose (Glutose) 15 gm Q15M  PRN PO DECREASED GLUCOSE;  Start 2/11/19 at 14:30


Glucose (Glutose) 22.5 gm Q15M  PRN PO DECREASED GLUCOSE;  Start 2/11/19 at 


14:30


Dextrose (D50w Syringe) 25 ml Q15M  PRN IV DECREASED GLUCOSE;  Start 2/11/19 at 


14:30


Dextrose (D50w Syringe) 50 ml Q15M  PRN IV DECREASED GLUCOSE;  Start 2/11/19 at 


14:30


Glucagon (Glucagen) 1 mg Q15M  PRN IM DECREASED GLUCOSE;  Start 2/11/19 at 14:30


Glucose (Glutose) 15 gm Q15M  PRN BUCCAL DECREASED GLUCOSE;  Start 2/11/19 at 


14:30


Nicotine (Nicoderm 21 Mg/ 24hr) 1 patch DAILY TRANSDERM ;  Start 2/11/19 at 


16:30


Metoprolol Succinate (Toprol Xl) 25 mg DAILY PO  Last administered on 2/12/19at 


08:08; Admin Dose 25 MG;  Start 2/12/19 at 09:00


Furosemide (Lasix) 40 mg BID IV  Last administered on 2/12/19at 08:08; Admin 


Dose 40 MG;  Start 2/12/19 at 09:00


Patient Own Medication 1 ea BID PO  Last administered on 2/12/19at 08:09; Admin 


Dose 1 EA;  Start 2/11/19 at 21:00


Famotidine (Pepcid) 20 mg Q24H PO  Last administered on 2/11/19at 22:24; Admin 


Dose 20 MG;  Start 2/11/19 at 22:00


Imaging


1. CHF - acute on chronic - not able to keep up with diuresis in the officewith 


Cr 2.2 - better now with IV lasix. 


2.  CAD - trend the patient's cardiac enzymes and assess for any significant 


ongoing cardiac damage. Doubt ACS now. 


3.  A. fib - continue the patient's baseline Eliquis at this time to prevent 


thromboembolic events in the setting of atrial fibrillation and would resume the


patient's baseline beta blocker and we will hold on the patient's Entresto at 


this time in the setting of acute on chronic renal failure.  We will give 


patient a low-dose hydralazine afterload reduction in lieu of this at this time.


5.  The patient is status post 2D echo in December revealing an ejection 


fraction of 30% - ICD in place. 


6.  DM - Follow the patient's blood sugars closely


7.  Check a digoxin level to rule out any possible accumulation in the setting 


of renal failure.


8. ARF - Cr high - renal team on the case.











DIO HARPER MD                 Feb 12, 2019 10:31

## 2019-02-12 NOTE — PN
Date/Time of Note


Date/Time of Note


DATE: 2/12/19 


TIME: 12:19





Assessment/Plan


VTE Prophylaxis


Risk score (from Ns)>0 risk:  5


SCD applied (from Ns):  No


SCD contraindicated:  other


Pharmacological prophylaxis:  apixaban





Lines/Catheters


Urinary Cath still in place:  No





Assessment/Plan


Hospital Course


S: Patient has less shortness of breath symptoms.  Seen by renal and cardiology 


team yesterday.





O: VS - see below


PHYSICAL EXAMINATION:


GENERAL:  lying in bed, in no acute distress.


HEENT:  Pupils equal, round and reactive to light.  Extraocular muscles are 


intact.


NECK:  Supple, no thyromegaly.


LUNGS:  Distant breath sounds bilaterally, mild crackles heard at the bases.


CARDIOVASCULAR:  Irregularly irregular heart rate.  No rubs or gallops.


ABDOMEN:  Soft, nontender and nondistended.  Normal bowel sounds.  No rebound or


guarding.


MUSCULOSKELETAL:  2+ pitting edema in bilateral lower extremities to the mid 


calves.


NEUROLOGIC:  No focal deficits.


 





 


ASSESSMENT AND PLAN: 68-year-old male coming in with chest pressure, shortness 


of breath, lower extremity swelling, signs of congestive heart failure 


exacerbation, also rule out for acute coronary syndrome.





1.  Shortness of breath and chest pressure -slowly improving, symptoms are 


likely secondary to congestive heart failure exacerbation. 


   -Continue fluid restriction as recommended by cardiology and renal teams, 


continue twice daily diurese as well with Lasix, low-dose beta-blocker


   -Continue to keep the head of the bed greater than 30 degrees


   - follow up Cardiology recommendations and continue digoxin for now.


   -Off of Entresto presently while in-house


2. Acute on chronic kidney disease.  Again, the kidney function appears to be 


slightly worse than when he was here 2 months ago


   -Follow-up recommendations renal consult


   -Continue to monitor BUN and creatinine levels and urine output. 


3.  Smoking history.  Counseled on cessation.  Order nicotine patch.


4.  History of hypertension.  Again, see above.  Continue current medications.  


Also hydralazine p.r.n.


5.  History of atrial fibrillation -appears stable


   -Monitor, continue Eliquis as well.


6.  Type 2 diabetes.  A1c equals 9.0, sugars presently stable 


   - continue Lantus and sliding scale insulin.


7.  Deep venous thrombosis prophylaxis-on Eliquis.


Result Diagram:  


2/12/19 0653                                                                    


           2/12/19 0653





Results 24hrs





Laboratory Tests


Test
                 2/11/19
13:55  2/11/19
17:58  2/11/19
18:58  2/11/19
21:00


Prothrombin Time            18.5  H


Prothrombin Time              1.4


Ratio


INR International           1.53  
  
              
              



Normalized
Ratio


Activated                  42.8  H
  
              
              



Partial
Thromboplast


Time


Creatine Kinase                58                            64


Creatine Kinase               3.1                           3.8


Index


Creatinine Kinase MB         1.82                         2.45  H


(Mass)


Troponin I                0.160  *H                     0.198  *H


Free Thyroxine               2.16


Bedside Glucose                              143


Urine Color                                                        YELLOW


Urine Clarity                                                      CLEAR


Urine pH                                                                   5.0


Urine Specific                                                           1.009


Gravity


Urine Ketones                                                      NEGATIVE


Urine Nitrite                                                      NEGATIVE


Urine Bilirubin                                                    NEGATIVE


Urine Urobilinogen                                                 NEGATIVE


Urine Leukocyte                                                    NEGATIVE


Esterase


Urine Eosinophils %                                                        0.0


Urine Hemoglobin                                                   NEGATIVE


Urine Random Sodium                                                         89


Urine Glucose                                                      NEGATIVE


Urine Total Protein                                                NEGATIVE


Test
                 2/11/19
21:36  2/12/19
00:49  2/12/19
02:33  2/12/19
04:34


Bedside Glucose              297  H          145                           122


Troponin I                                              0.171  *H


Test
                 2/12/19
06:50  2/12/19
06:53  2/12/19
08:05  2/12/19
11:55


Digoxin Level                 1.5


White Blood Count                            5.5


Red Blood Count                            4.42  L


Hemoglobin                                 10.9  L


Hematocrit                                 35.1  L


Mean Corpuscular                           79.4  L


Volume


Mean Corpuscular                           24.7  L


Hemoglobin


Mean Corpuscular      
                   31.1  L
  
              



Hemoglobin
Concent


Red Cell                                   18.5  H


Distribution Width


Platelet Count                               178


Mean Platelet Volume                       13.4  H


Immature                                   0.400


Granulocytes %


Neutrophils %                               55.7


Lymphocytes %                               28.7


Monocytes %                                 10.8


Eosinophils %                                3.1


Basophils %                                  1.3


Nucleated Red Blood                          0.0


Cells %


Immature                                   0.020


Granulocytes #


Neutrophils #                                3.1


Lymphocytes #                                1.6


Monocytes #                                  0.6


Eosinophils #                                0.2


Basophils #                                  0.1


Nucleated Red Blood                          0.0


Cells #


Sodium Level                                 143


Potassium Level                              3.9


Chloride Level                               105


Carbon Dioxide Level                         33  H


Anion Gap                                     5  #


Blood Urea Nitrogen                          54  H


Creatinine                                 2.27  H


Est Glomerular        
                     29  L
  
              



Filtrat Rate
mL/min


Glucose Level                               101  #


Hemoglobin A1c                              9.0  H


Calcium Level                                9.2


Phosphorus Level                             3.9


Magnesium Level                              2.0


Troponin I                               0.166  *H


Triglycerides Level                           90


Cholesterol Level                            90  L


LDL Cholesterol,                              31


Calculated


HDL Cholesterol                               41


Cholesterol/HDL                              2.1


Ratio


Thyroid Stimulating   
                  5.510  H
  
              



Hormone
(TSH)


Bedside Glucose                                              98            120








Exam/Review of Systems


Exam


Vitals





Vital Signs


  Date      Temp  Pulse  Resp  B/P (MAP)   Pulse Ox  O2          O2 Flow    FiO2


Time                                                 Delivery    Rate


   2/12/19  97.7     77    18      140/65        92


     11:23                           (90)


   2/11/19                                           Room Air


     15:50








Intake and Output





2/11/19 2/11/19 2/12/19





1515:00


23:00


07:00





IntakeIntake Total


220 ml


120 ml





OutputOutput Total


600 ml


400 ml





BalanceBalance


-380 ml


-280 ml














Results


Results 24hrs





Laboratory Tests


Test
                 2/11/19
13:55  2/11/19
17:58  2/11/19
18:58  2/11/19
21:00


Prothrombin Time            18.5  H


Prothrombin Time              1.4


Ratio


INR International           1.53  
  
              
              



Normalized
Ratio


Activated                  42.8  H
  
              
              



Partial
Thromboplast


Time


Creatine Kinase                58                            64


Creatine Kinase               3.1                           3.8


Index


Creatinine Kinase MB         1.82                         2.45  H


(Mass)


Troponin I                0.160  *H                     0.198  *H


Free Thyroxine               2.16


Bedside Glucose                              143


Urine Color                                                        YELLOW


Urine Clarity                                                      CLEAR


Urine pH                                                                   5.0


Urine Specific                                                           1.009


Gravity


Urine Ketones                                                      NEGATIVE


Urine Nitrite                                                      NEGATIVE


Urine Bilirubin                                                    NEGATIVE


Urine Urobilinogen                                                 NEGATIVE


Urine Leukocyte                                                    NEGATIVE


Esterase


Urine Eosinophils %                                                        0.0


Urine Hemoglobin                                                   NEGATIVE


Urine Random Sodium                                                         89


Urine Glucose                                                      NEGATIVE


Urine Total Protein                                                NEGATIVE


Test
                 2/11/19
21:36  2/12/19
00:49  2/12/19
02:33  2/12/19
04:34


Bedside Glucose              297  H          145                           122


Troponin I                                              0.171  *H


Test
                 2/12/19
06:50  2/12/19
06:53  2/12/19
08:05  2/12/19
11:55


Digoxin Level                 1.5


White Blood Count                            5.5


Red Blood Count                            4.42  L


Hemoglobin                                 10.9  L


Hematocrit                                 35.1  L


Mean Corpuscular                           79.4  L


Volume


Mean Corpuscular                           24.7  L


Hemoglobin


Mean Corpuscular      
                   31.1  L
  
              



Hemoglobin
Concent


Red Cell                                   18.5  H


Distribution Width


Platelet Count                               178


Mean Platelet Volume                       13.4  H


Immature                                   0.400


Granulocytes %


Neutrophils %                               55.7


Lymphocytes %                               28.7


Monocytes %                                 10.8


Eosinophils %                                3.1


Basophils %                                  1.3


Nucleated Red Blood                          0.0


Cells %


Immature                                   0.020


Granulocytes #


Neutrophils #                                3.1


Lymphocytes #                                1.6


Monocytes #                                  0.6


Eosinophils #                                0.2


Basophils #                                  0.1


Nucleated Red Blood                          0.0


Cells #


Sodium Level                                 143


Potassium Level                              3.9


Chloride Level                               105


Carbon Dioxide Level                         33  H


Anion Gap                                     5  #


Blood Urea Nitrogen                          54  H


Creatinine                                 2.27  H


Est Glomerular        
                     29  L
  
              



Filtrat Rate
mL/min


Glucose Level                               101  #


Hemoglobin A1c                              9.0  H


Calcium Level                                9.2


Phosphorus Level                             3.9


Magnesium Level                              2.0


Troponin I                               0.166  *H


Triglycerides Level                           90


Cholesterol Level                            90  L


LDL Cholesterol,                              31


Calculated


HDL Cholesterol                               41


Cholesterol/HDL                              2.1


Ratio


Thyroid Stimulating   
                  5.510  H
  
              



Hormone
(TSH)


Bedside Glucose                                              98            120








Medications


Medication





Current Medications


IV Flush (NS 3 ml) 3 ml PER PROTOCOL IV ;  Start 2/11/19 at 14:00


Ondansetron HCl (Zofran Inj) 4 mg Q6H  PRN IV NAUSEA/VOMITING;  Start 2/11/19 at


14:00


Acetaminophen (Tylenol Tab) 650 mg Q6H  PRN PO .PAIN 1-3 OR TEMP Last 


administered on 2/11/19at 22:02; Admin Dose 650 MG;  Start 2/11/19 at 14:00


Acetaminophen/ Hydrocodone Bitart (Norco (5/325)) 1 tab Q6H  PRN PO .MOD PAIN 4-


6 Last administered on 2/11/19at 22:58; Admin Dose 1 TAB;  Start 2/11/19 at 


14:00


Morphine Sulfate (morphine) 6 mg Q4H  PRN PO .SEVERE PAIN 7-10;  Start 2/11/19 


at 14:00


Docusate Sodium (Colace) 100 mg Q12H  PRN PO .CONSTIPATION;  Start 2/11/19 at 


14:00


Magnesium Hydroxide (Milk Of Mag) 30 ml DAILY  PRN PO .CONSTIPATION;  Start 


2/11/19 at 14:00


Lorazepam (Ativan) 0.5 mg Q6H  PRN PO ANXIETY;  Start 2/11/19 at 14:00


Albuterol/ Ipratropium (Duoneb) 3 ml Q4H RESP THERAPY  PRN HHN SHORTNESS OF 


BREATH;  Start 2/11/19 at 14:00


Hydralazine HCl (Apresoline) 10 mg Q6H  PRN IV ELEVATED BLOOD PRESSURE;  Start 


2/11/19 at 14:00


Nitroglycerin (Nitroglycerin (Sl Tab) 0.4 Mg) 1 tab Q5M  PRN SL ANGINA;  Start 


2/11/19 at 14:00


Apixaban (Eliquis) 5 mg BID PO  Last administered on 2/12/19at 08:07; Admin Dose


5 MG;  Start 2/11/19 at 21:00


Digoxin (Digoxin) 0.125 mg DAILY@1300 PO ;  Start 2/12/19 at 13:00


Doxazosin Mesylate (Cardura) 4 mg BID PO  Last administered on 2/12/19at 08:07; 


Admin Dose 4 MG;  Start 2/11/19 at 21:00


Ergocalciferol (Drisdol) 50,000 unit Q7D PO  Last administered on 2/11/19at 


18:03; Admin Dose 50,000 UNIT;  Start 2/11/19 at 14:00


Ferrous Sulfate (Ferrous Sulfate (Ec)) 325 mg BID PO  Last administered on 


2/12/19at 08:08; Admin Dose 325 MG;  Start 2/11/19 at 21:00


Insulin Glargine (Lantus) 12 units QHS SC  Last administered on 2/11/19at 21:46;


Admin Dose 12 UNITS;  Start 2/11/19 at 21:00


Famotidine (Pepcid) 20 mg DAILY PO  Last administered on 2/12/19at 08:08; Admin 


Dose 20 MG;  Start 2/12/19 at 09:00


Atorvastatin Calcium (Lipitor) 80 mg HS PO  Last administered on 2/11/19at 


20:53; Admin Dose 80 MG;  Start 2/11/19 at 21:00


Diagnostic Test (Pha) (Accu-Chek) 1 ea 02 XX ;  Start 2/12/19 at 02:00


Insulin Aspart (Novolog Insulin Pen) NOVOLOG *MILD* ALGORI... Q4 SC  Last 


administered on 2/12/19at 01:01; Admin Dose 1 UNIT;  Start 2/11/19 at 17:00


Miscellaneous Information 1 ea NOTE XX ;  Start 2/11/19 at 14:30


Glucose (Glutose) 15 gm Q15M  PRN PO DECREASED GLUCOSE;  Start 2/11/19 at 14:30


Glucose (Glutose) 22.5 gm Q15M  PRN PO DECREASED GLUCOSE;  Start 2/11/19 at 


14:30


Dextrose (D50w Syringe) 25 ml Q15M  PRN IV DECREASED GLUCOSE;  Start 2/11/19 at 


14:30


Dextrose (D50w Syringe) 50 ml Q15M  PRN IV DECREASED GLUCOSE;  Start 2/11/19 at 


14:30


Glucagon (Glucagen) 1 mg Q15M  PRN IM DECREASED GLUCOSE;  Start 2/11/19 at 14:30


Glucose (Glutose) 15 gm Q15M  PRN BUCCAL DECREASED GLUCOSE;  Start 2/11/19 at 


14:30


Nicotine (Nicoderm 21 Mg/ 24hr) 1 patch DAILY TRANSDERM ;  Start 2/11/19 at 


16:30


Metoprolol Succinate (Toprol Xl) 25 mg DAILY PO  Last administered on 2/12/19at 


08:08; Admin Dose 25 MG;  Start 2/12/19 at 09:00


Furosemide (Lasix) 40 mg BID IV  Last administered on 2/12/19at 08:08; Admin 


Dose 40 MG;  Start 2/12/19 at 09:00


Patient Own Medication 1 ea BID PO  Last administered on 2/12/19at 08:09; Admin 


Dose 1 EA;  Start 2/11/19 at 21:00


Famotidine (Pepcid) 20 mg Q24H PO  Last administered on 2/11/19at 22:24; Admin 


Dose 20 MG;  Start 2/11/19 at 22:00











GORDO MCDERMOTT              Feb 12, 2019 12:25

## 2019-02-12 NOTE — CONS
Assessment/Plan


Assessment/Plan


Assessment/Plan (Daily)





ASSESSMENT:  A 68-year-old male presenting with:


1.  Shortness of breath with bilateral lower extremity edema, orthopnea and PND 


likely secondary to acute on chronic congestive heart failure.


2.  Acute on chronic renal failure; however, the patient's baseline creatinine 


ranges from 1.9 to 2.  On last discharge in December, the creatinine was 1.9.  


This could be all secondary to cardiorenal as the patient is clearly in 


decompensated heart failure right now.Cr stable from yesterday, pt had Renal U/S


in 12/18 which showed chronci kidney disease


3.  History of atrial fibrillation.


4.  Hypertension.


5.  Diabetes.


6.  Hyperlipidemia.


7.  BPH.


8.  Positive troponin with chest pain ? NSTEMI


9.  Elevated BNP.


 


PLAN:


- CW  with Lasix 40 IV b.i.d.


- cw  hold off Entresto at this point.


- Digoxin levels normal.


-.  Keep a systolic blood pressure of greater than 100.


- Renally dose all meds.


-   Avoid nephrotoxic agents.





Consultation Date/Type/Reason


Admit Date/Time


Feb 11, 2019 at 13:13


Initial Consult Date





Date/Time of Note


DATE: 2/12/19 


TIME: 13:22





24 HR Interval Summary


Free Text/Dictation


Feels slightly better.





Exam/Review of Systems


Exam


Vitals





Vital Signs


  Date      Temp  Pulse  Resp  B/P (MAP)   Pulse Ox  O2          O2 Flow    FiO2


Time                                                 Delivery    Rate


   2/12/19  97.7     77    18      140/65        92


     11:23                           (90)


   2/11/19                                           Room Air


     15:50








Intake and Output





2/11/19 2/11/19 2/12/19





1515:00


23:00


07:00





IntakeIntake Total


220 ml


120 ml





OutputOutput Total


600 ml


400 ml





BalanceBalance


-380 ml


-280 ml











Exam


ENERAL:  The patient is awake, alert, oriented and appears to be in mild distr


ess secondary to shortness of breath.


HEENT:  Pupils equal, round and reactive to light.  Extraocular movements are 


intact.


NECK:  Supple.  JVD appreciated.


LUNGS:  Decreased breath sounds bilaterally.  Few crackles heard at the bases.


HEART:  Irregularly irregular.  No murmur, rub or gallop.


ABDOMEN:  Soft, somewhat distended with some abdominal wall edema.


EXTREMITIES:  Legs with 2 to 3+ pitting edema in bilateral lower extremity to 


mid-calf.


GENERAL:  Awake, alert, oriented and does not appear to in any acute distress.


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





Results


Result Diagram:  


2/12/19 0653                                                                    


           2/12/19 0653





Results 24hrs





Laboratory Tests


Test
                 2/11/19
13:55  2/11/19
17:58  2/11/19
18:58  2/11/19
21:00


Prothrombin Time            18.5  H


Prothrombin Time              1.4


Ratio


[employer]R [employer]           1.53  
  
              
              



Normalized
Ratio


Activated                  42.8  H
  
              
              



Partial
Thromboplast


Time


Creatine Kinase                58                            64


Creatine Kinase               3.1                           3.8


Index


Creatinine Kinase MB         1.82                         2.45  H


(Mass)


Troponin I                0.160  *H                     0.198  *H


Free Thyroxine               2.16


Bedside Glucose                              143


Urine Color                                                        YELLOW


Urine Clarity                                                      CLEAR


Urine pH                                                                   5.0


Urine Specific                                                           1.009


Gravity


Urine Ketones                                                      NEGATIVE


Urine Nitrite                                                      NEGATIVE


Urine Bilirubin                                                    NEGATIVE


Urine Urobilinogen                                                 NEGATIVE


Urine Leukocyte                                                    NEGATIVE


Esterase


Urine Eosinophils %                                                        0.0


Urine Hemoglobin                                                   NEGATIVE


Urine Random Sodium                                                         89


Urine Glucose                                                      NEGATIVE


Urine Total Protein                                                NEGATIVE


Test
                 2/11/19
21:36  2/12/19
00:49  2/12/19
02:33  2/12/19
04:34


Bedside Glucose              297  H          145                           122


Troponin I                                              0.171  *H


Test
                 2/12/19
06:50  2/12/19
06:53  2/12/19
08:05  2/12/19
11:55


Digoxin Level                 1.5


White Blood Count                            5.5


Red Blood Count                            4.42  L


Hemoglobin                                 10.9  L


Hematocrit                                 35.1  L


Mean Corpuscular                           79.4  L


Volume


Mean Corpuscular                           24.7  L


Hemoglobin


Mean Corpuscular      
                   31.1  L
  
              



Hemoglobin
Concent


Red Cell                                   18.5  H


Distribution Width


Platelet Count                               178


Mean Platelet Volume                       13.4  H


Immature                                   0.400


Granulocytes %


Neutrophils %                               55.7


Lymphocytes %                               28.7


Monocytes %                                 10.8


Eosinophils %                                3.1


Basophils %                                  1.3


Nucleated Red Blood                          0.0


Cells %


Immature                                   0.020


Granulocytes #


Neutrophils #                                3.1


Lymphocytes #                                1.6


Monocytes #                                  0.6


Eosinophils #                                0.2


Basophils #                                  0.1


Nucleated Red Blood                          0.0


Cells #


Sodium Level                                 143


Potassium Level                              3.9


Chloride Level                               105


Carbon Dioxide Level                         33  H


Anion Gap                                     5  #


Blood Urea Nitrogen                          54  H


Creatinine                                 2.27  H


Est Glomerular        
                     29  L
  
              



Filtrat Rate
mL/min


Glucose Level                               101  #


Hemoglobin A1c                              9.0  H


Calcium Level                                9.2


Phosphorus Level                             3.9


Magnesium Level                              2.0


Troponin I                               0.166  *H


Triglycerides Level                           90


Cholesterol Level                            90  L


LDL Cholesterol,                              31


Calculated


HDL Cholesterol                               41


Cholesterol/HDL                              2.1


Ratio


Thyroid Stimulating   
                  5.510  H
  
              



Hormone
(TSH)


Bedside Glucose                                              98            120








Medications


Medication





Current Medications


IV Flush (NS 3 ml) 3 ml PER PROTOCOL IV ;  Start 2/11/19 at 14:00


Ondansetron HCl (Zofran Inj) 4 mg Q6H  PRN IV NAUSEA/VOMITING;  Start 2/11/19 at


14:00


Acetaminophen (Tylenol Tab) 650 mg Q6H  PRN PO .PAIN 1-3 OR TEMP Last 


administered on 2/11/19at 22:02; Admin Dose 650 MG;  Start 2/11/19 at 14:00


Acetaminophen/ Hydrocodone Bitart (Norco (5/325)) 1 tab Q6H  PRN PO .MOD PAIN 4-


6 Last administered on 2/12/19at 13:01; Admin Dose 1 TAB;  Start 2/11/19 at 


14:00


Morphine Sulfate (morphine) 6 mg Q4H  PRN PO .SEVERE PAIN 7-10;  Start 2/11/19 


at 14:00


Docusate Sodium (Colace) 100 mg Q12H  PRN PO .CONSTIPATION;  Start 2/11/19 at 


14:00


Magnesium Hydroxide (Milk Of Mag) 30 ml DAILY  PRN PO .CONSTIPATION;  Start 


2/11/19 at 14:00


Lorazepam (Ativan) 0.5 mg Q6H  PRN PO ANXIETY;  Start 2/11/19 at 14:00


Albuterol/ Ipratropium (Duoneb) 3 ml Q4H RESP THERAPY  PRN HHN SHORTNESS OF 


BREATH;  Start 2/11/19 at 14:00


Hydralazine HCl (Apresoline) 10 mg Q6H  PRN IV ELEVATED BLOOD PRESSURE;  Start 


2/11/19 at 14:00


Nitroglycerin (Nitroglycerin (Sl Tab) 0.4 Mg) 1 tab Q5M  PRN SL ANGINA;  Start 


2/11/19 at 14:00


Apixaban (Eliquis) 5 mg BID PO  Last administered on 2/12/19at 08:07; Admin Dose


5 MG;  Start 2/11/19 at 21:00


Digoxin (Digoxin) 0.125 mg DAILY@1300 PO  Last administered on 2/12/19at 12:42; 


Admin Dose 0.125 MG;  Start 2/12/19 at 13:00


Doxazosin Mesylate (Cardura) 4 mg BID PO  Last administered on 2/12/19at 08:07; 


Admin Dose 4 MG;  Start 2/11/19 at 21:00


Ergocalciferol (Drisdol) 50,000 unit Q7D PO  Last administered on 2/11/19at 


18:03; Admin Dose 50,000 UNIT;  Start 2/11/19 at 14:00


Ferrous Sulfate (Ferrous Sulfate (Ec)) 325 mg BID PO  Last administered on 


2/12/19at 08:08; Admin Dose 325 MG;  Start 2/11/19 at 21:00


Insulin Glargine (Lantus) 12 units QHS SC  Last administered on 2/11/19at 21:46;


Admin Dose 12 UNITS;  Start 2/11/19 at 21:00


Famotidine (Pepcid) 20 mg DAILY PO  Last administered on 2/12/19at 08:08; Admin 


Dose 20 MG;  Start 2/12/19 at 09:00


Atorvastatin Calcium (Lipitor) 80 mg HS PO  Last administered on 2/11/19at 


20:53; Admin Dose 80 MG;  Start 2/11/19 at 21:00


Diagnostic Test (Pha) (Accu-Chek) 1 ea 02 XX ;  Start 2/12/19 at 02:00


Miscellaneous Information 1 ea NOTE XX ;  Start 2/11/19 at 14:30


Glucose (Glutose) 15 gm Q15M  PRN PO DECREASED GLUCOSE;  Start 2/11/19 at 14:30


Glucose (Glutose) 22.5 gm Q15M  PRN PO DECREASED GLUCOSE;  Start 2/11/19 at 


14:30


Dextrose (D50w Syringe) 25 ml Q15M  PRN IV DECREASED GLUCOSE;  Start 2/11/19 at 


14:30


Dextrose (D50w Syringe) 50 ml Q15M  PRN IV DECREASED GLUCOSE;  Start 2/11/19 at 


14:30


Glucagon (Glucagen) 1 mg Q15M  PRN IM DECREASED GLUCOSE;  Start 2/11/19 at 14:30


Glucose (Glutose) 15 gm Q15M  PRN BUCCAL DECREASED GLUCOSE;  Start 2/11/19 at 


14:30


Nicotine (Nicoderm 21 Mg/ 24hr) 1 patch DAILY TRANSDERM ;  Start 2/11/19 at 


16:30


Metoprolol Succinate (Toprol Xl) 25 mg DAILY PO  Last administered on 2/12/19at 


08:08; Admin Dose 25 MG;  Start 2/12/19 at 09:00


Furosemide (Lasix) 40 mg BID IV  Last administered on 2/12/19at 08:08; Admin 


Dose 40 MG;  Start 2/12/19 at 09:00


Patient Own Medication 1 ea BID PO  Last administered on 2/12/19at 08:09; Admin 


Dose 1 EA;  Start 2/11/19 at 21:00;  Status Hold


Famotidine (Pepcid) 20 mg Q24H PO  Last administered on 2/11/19at 22:24; Admin 


Dose 20 MG;  Start 2/11/19 at 22:00


Insulin Aspart (Novolog Insulin Pen) (Adult SC Insulin - Mild Algorithm)... AC 


MEALS AND  BEDTIME SC ;  Start 2/12/19 at 17:25











JOSE YANG MD              Feb 12, 2019 13:22

## 2019-02-13 VITALS — DIASTOLIC BLOOD PRESSURE: 60 MMHG | SYSTOLIC BLOOD PRESSURE: 130 MMHG | RESPIRATION RATE: 18 BRPM | HEART RATE: 59 BPM

## 2019-02-13 VITALS — HEART RATE: 63 BPM

## 2019-02-13 VITALS — SYSTOLIC BLOOD PRESSURE: 110 MMHG | DIASTOLIC BLOOD PRESSURE: 58 MMHG | HEART RATE: 60 BPM | RESPIRATION RATE: 20 BRPM

## 2019-02-13 VITALS — RESPIRATION RATE: 18 BRPM | SYSTOLIC BLOOD PRESSURE: 109 MMHG | DIASTOLIC BLOOD PRESSURE: 55 MMHG | HEART RATE: 57 BPM

## 2019-02-13 VITALS — HEART RATE: 51 BPM

## 2019-02-13 VITALS — DIASTOLIC BLOOD PRESSURE: 74 MMHG | SYSTOLIC BLOOD PRESSURE: 110 MMHG

## 2019-02-13 VITALS — HEART RATE: 56 BPM

## 2019-02-13 VITALS — SYSTOLIC BLOOD PRESSURE: 85 MMHG | DIASTOLIC BLOOD PRESSURE: 50 MMHG

## 2019-02-13 VITALS — HEART RATE: 54 BPM

## 2019-02-13 VITALS — HEART RATE: 68 BPM

## 2019-02-13 VITALS — DIASTOLIC BLOOD PRESSURE: 54 MMHG | SYSTOLIC BLOOD PRESSURE: 115 MMHG | RESPIRATION RATE: 17 BRPM | HEART RATE: 67 BPM

## 2019-02-13 LAB
ABNORMAL IP MESSAGE: 1
ADD MAN DIFF?: NO
ALLEN TEST: (no result)
ANION GAP: 10 (ref 5–13)
ARTERIAL BASE EXCESS: 5.5 MMOL/L (ref -3–3)
ARTERIAL BLOOD GAS OXYGEN SAT: 96.8 MMHG (ref 95–98)
ARTERIAL COHB: 1.1 % (ref 0–3)
ARTERIAL FRACTION OF OXYHGB: 95.4 % (ref 93–99)
ARTERIAL HCO3: 31.9 MMOL/L (ref 22–26)
ARTERIAL METHB: 0.3 % (ref 0–1.5)
ARTERIAL PCO2: 54.7 MMHG (ref 35–45)
BASOPHIL #: 0.1 10^3/UL (ref 0–0.1)
BASOPHILS %: 1.5 % (ref 0–2)
BLOOD UREA NITROGEN: 53 MG/DL (ref 7–20)
CALCIUM: 9 MG/DL (ref 8.4–10.2)
CARBON DIOXIDE: 31 MMOL/L (ref 21–31)
CHLORIDE: 100 MMOL/L (ref 97–110)
CREATININE: 2.17 MG/DL (ref 0.61–1.24)
EOSINOPHILS #: 0.1 10^3/UL (ref 0–0.5)
EOSINOPHILS %: 2 % (ref 0–7)
FIO2: 100 %
GLUCOSE: 114 MG/DL (ref 70–220)
HEMATOCRIT: 36.8 % (ref 42–52)
HEMOGLOBIN: 11 G/DL (ref 14–18)
IMMATURE GRANS #M: 0.01 10^3/UL (ref 0–0.03)
IMMATURE GRANS % (M): 0.2 % (ref 0–0.43)
LYMPHOCYTES #: 1.2 10^3/UL (ref 0.8–2.9)
LYMPHOCYTES %: 21.4 % (ref 15–51)
MEAN CORPUSCULAR HEMOGLOBIN: 24.3 PG (ref 29–33)
MEAN CORPUSCULAR HGB CONC: 29.9 G/DL (ref 32–37)
MEAN CORPUSCULAR VOLUME: 81.4 FL (ref 82–101)
MEAN PLATELET VOLUME: 11.8 FL (ref 7.4–10.4)
MODE: (no result)
MONOCYTE #: 0.5 10^3/UL (ref 0.3–0.9)
MONOCYTES %: 9.8 % (ref 0–11)
NEUTROPHIL #: 3.5 10^3/UL (ref 1.6–7.5)
NEUTROPHILS %: 65.1 % (ref 39–77)
NUCLEATED RED BLOOD CELLS #: 0 10^3/UL (ref 0–0)
NUCLEATED RED BLOOD CELLS%: 0 /100WBC (ref 0–0)
O2 A-A PPRESDIFF RESPIRATORY: 568.2 MMHG (ref 7–24)
PLATELET COUNT: 160 10^3/UL (ref 140–415)
POSITIVE DIFF: (no result)
POTASSIUM: 4.1 MMOL/L (ref 3.5–5.1)
RED BLOOD COUNT: 4.52 10^6/UL (ref 4.7–6.1)
RED CELL DISTRIBUTION WIDTH: 18.5 % (ref 11.5–14.5)
SODIUM: 141 MMOL/L (ref 135–144)
TROPONIN-I: 0.11 NG/ML (ref 0–0.12)
WHITE BLOOD COUNT: 5.4 10^3/UL (ref 4.8–10.8)

## 2019-02-13 RX ADMIN — FERROUS SULFATE TAB 325 MG (65 MG ELEMENTAL FE) SCH MG: 325 (65 FE) TAB at 22:07

## 2019-02-13 RX ADMIN — IPRATROPIUM BROMIDE AND ALBUTEROL SULFATE PRN ML: .5; 3 SOLUTION RESPIRATORY (INHALATION) at 05:39

## 2019-02-13 RX ADMIN — FUROSEMIDE 1 MG: 10 INJECTION, SOLUTION INTRAVENOUS at 17:34

## 2019-02-13 RX ADMIN — ONDANSETRON HYDROCHLORIDE 1 MG: 2 INJECTION, SOLUTION INTRAMUSCULAR; INTRAVENOUS at 04:47

## 2019-02-13 RX ADMIN — FERROUS SULFATE TAB 325 MG (65 MG ELEMENTAL FE) 1 MG: 325 (65 FE) TAB at 22:07

## 2019-02-13 RX ADMIN — INSULIN GLARGINE SCH UNITS: 100 INJECTION, SOLUTION SUBCUTANEOUS at 21:00

## 2019-02-13 RX ADMIN — FAMOTIDINE SCH MG: 20 TABLET ORAL at 22:08

## 2019-02-13 RX ADMIN — INSULIN GLARGINE 1 UNITS: 100 INJECTION, SOLUTION SUBCUTANEOUS at 21:00

## 2019-02-13 RX ADMIN — FAMOTIDINE 1 MG: 20 TABLET ORAL at 22:08

## 2019-02-13 RX ADMIN — ATORVASTATIN CALCIUM 1 MG: 80 TABLET, FILM COATED ORAL at 22:07

## 2019-02-13 RX ADMIN — ATORVASTATIN CALCIUM SCH MG: 80 TABLET, FILM COATED ORAL at 22:07

## 2019-02-13 RX ADMIN — DOXAZOSIN 1 MG: 4 TABLET ORAL at 08:42

## 2019-02-13 RX ADMIN — DEXAMETHASONE SODIUM PHOSPHATE PRN MG: 10 INJECTION, SOLUTION INTRAMUSCULAR; INTRAVENOUS at 04:47

## 2019-02-13 RX ADMIN — INSULIN ASPART 1 UNIT: 100 INJECTION, SOLUTION INTRAVENOUS; SUBCUTANEOUS at 17:41

## 2019-02-13 RX ADMIN — FERROUS SULFATE TAB 325 MG (65 MG ELEMENTAL FE) SCH MG: 325 (65 FE) TAB at 08:42

## 2019-02-13 RX ADMIN — DOXAZOSIN SCH MG: 4 TABLET ORAL at 08:42

## 2019-02-13 RX ADMIN — FAMOTIDINE 1 MG: 20 TABLET ORAL at 08:42

## 2019-02-13 RX ADMIN — DOXAZOSIN SCH MG: 4 TABLET ORAL at 21:00

## 2019-02-13 RX ADMIN — APIXABAN 1 MG: 5 TABLET, FILM COATED ORAL at 08:42

## 2019-02-13 RX ADMIN — METOPROLOL SUCCINATE 1 MG: 25 TABLET, EXTENDED RELEASE ORAL at 08:43

## 2019-02-13 RX ADMIN — APIXABAN SCH MG: 5 TABLET, FILM COATED ORAL at 08:42

## 2019-02-13 RX ADMIN — DIGOXIN 1 MG: 125 TABLET ORAL at 13:21

## 2019-02-13 RX ADMIN — FAMOTIDINE SCH MG: 20 TABLET ORAL at 08:42

## 2019-02-13 RX ADMIN — DIGOXIN SCH MG: 125 TABLET ORAL at 13:21

## 2019-02-13 RX ADMIN — METOPROLOL SUCCINATE SCH MG: 25 TABLET, EXTENDED RELEASE ORAL at 08:43

## 2019-02-13 RX ADMIN — NICOTINE 1 PATCH: 21 PATCH, EXTENDED RELEASE TRANSDERMAL at 08:42

## 2019-02-13 RX ADMIN — FUROSEMIDE 1 MG: 10 INJECTION, SOLUTION INTRAVENOUS at 05:20

## 2019-02-13 RX ADMIN — ACETAMINOPHEN 1 MG: 325 TABLET, FILM COATED ORAL at 13:07

## 2019-02-13 RX ADMIN — APIXABAN SCH MG: 5 TABLET, FILM COATED ORAL at 22:07

## 2019-02-13 RX ADMIN — INSULIN ASPART 1 UNIT: 100 INJECTION, SOLUTION INTRAVENOUS; SUBCUTANEOUS at 12:07

## 2019-02-13 RX ADMIN — INSULIN ASPART 1 UNIT: 100 INJECTION, SOLUTION INTRAVENOUS; SUBCUTANEOUS at 07:25

## 2019-02-13 RX ADMIN — FERROUS SULFATE TAB 325 MG (65 MG ELEMENTAL FE) 1 MG: 325 (65 FE) TAB at 08:42

## 2019-02-13 RX ADMIN — INSULIN ASPART 1 UNIT: 100 INJECTION, SOLUTION INTRAVENOUS; SUBCUTANEOUS at 22:22

## 2019-02-13 RX ADMIN — METOLAZONE 1 MG: 2.5 TABLET ORAL at 17:15

## 2019-02-13 RX ADMIN — IPRATROPIUM BROMIDE AND ALBUTEROL SULFATE 1 ML: .5; 3 SOLUTION RESPIRATORY (INHALATION) at 05:39

## 2019-02-13 RX ADMIN — APIXABAN 1 MG: 5 TABLET, FILM COATED ORAL at 22:07

## 2019-02-13 RX ADMIN — DOXAZOSIN 1 MG: 4 TABLET ORAL at 21:00

## 2019-02-13 RX ADMIN — NICOTINE SCH PATCH: 21 PATCH, EXTENDED RELEASE TRANSDERMAL at 08:42

## 2019-02-13 NOTE — PN
Date/Time of Note


Date/Time of Note


DATE: 2/13/19 


TIME: 15:11





Assessment/Plan


VTE Prophylaxis


Risk score (from Ns)>0 risk:  5


SCD applied (from [facility]):  No


SCD contraindicated:  low risk/ambulating


Pharmacological prophylaxis:  NA/contraindicated


Pharm contraindication:  low risk/ambulating





Lines/Catheters


Urinary Cath still in place:  No





Assessment/Plan


Hospital Course


  A 68-year-old male presenting with:


1.  Shortness of breath with bilateral lower extremity edema, orthopnea and PND 


likely secondary to acute on chronic congestive heart failure.


2.  Acute on chronic renal failure; however, the patient's baseline creatinine 


ranges from 1.9 to 2.  On last discharge in December, the creatinine was 1.9.  


This could be all secondary to cardiorenal as the patient is clearly in 


decompensated heart failure right now.Cr stable from yesterday, pt had Renal U/S


in 12/18 which showed chronci kidney disease


3.  History of atrial fibrillation.


4.  Hypertension.


5.  Diabetes.


6.  Hyperlipidemia.


7.  BPH.


8.  Positive troponin with chest pain ? NSTEMI


9.  Elevated BNP.


 


PLAN:


- neg 1 L


- due to worseing sob/chest xray and cr being stable> will increase lasix to 40 


tid


- cw  hold off Entresto at this point.


-  Digoxin levels normal.


-. Keep a systolic blood pressure of greater than 100.


-   Renally dose all meds.


-   Avoid nephrotoxic agents.





spoke to family at bedside


Result Diagram:  


2/13/19 0545                                                                    


           2/13/19 0545





Results 24hrs





Laboratory Tests


Test
              2/12/19
17:14  2/12/19
20:23  2/13/19
04:51     2/13/19
05:01


Bedside Glucose            122            147            108


Blood Gas          
              
              
              Blood arterial



Specimen Source



Arterial Blood     
              
              
              2/13/2019
5:10:0


Date Drawn
                                                                 1 AM


Arterial Blood pH  
              
              
                      7.383  



(Temp
corrected)


Arterial Blood     
              
              
                      54.7  H



pCO2


(Temp
correct)


Arterial Blood     
              
              
                       90.1  



pO2


(Temp
corrected)


Arterial Blood                                                           31.9  H


HCO3


Arterial Blood                                                            5.5  H


Base Excess


Arterial Blood     
              
              
                       96.8  



Oxygen
Saturation


Matty Test                                                      ACCEPTAB


Arterial Blood     
              
              
              Right Radial  



Gas Puncture
Site


Arterial           
              
              
                        1.1  



Blood
Carboxyhemo


globin


Arterial Blood                                                             0.3


Methemoglobin


Blood Gas A-a O2                                                        568.2  H


Differential


Oxyhemoglobin                                                             95.4


Percent


Blood Gas                                                                 37.0


Temperature


Blood Gas Actual   
              
              
                         22  



Respiration
Rate


Blood Gas                                                       MASK - NRB


Modality


FiO2                                                                     100.0


Blood Gas                                                       MG


Notified Whom


Blood Gas          
              
              
              2/13/2019
5:30:0


Notified Time
                                                              9 AM


Test
              2/13/19
05:45  2/13/19
07:42  2/13/19
11:59     2/13/19
13:05


White Blood Count          5.4


Red Blood Count          4.52  L


Hemoglobin               11.0  L


Hematocrit               36.8  L


Mean Corpuscular         81.4  L


Volume


Mean Corpuscular         24.3  L


Hemoglobin


Mean Corpuscular        29.9  L
  
              
              



Hemoglobin
Concen


t


Red Cell                 18.5  H


Distribution


Width


Platelet Count             160


Mean Platelet            11.8  H


Volume


Immature                 0.200


Granulocytes %


Neutrophils %             65.1


Lymphocytes %             21.4


Monocytes %                9.8


Eosinophils %              2.0


Basophils %                1.5


Nucleated Red              0.0


Blood Cells %


Immature                 0.010


Granulocytes #


Neutrophils #              3.5


Lymphocytes #              1.2


Monocytes #                0.5


Eosinophils #              0.1


Basophils #                0.1


Nucleated Red              0.0


Blood Cells #


Sodium Level               141


Potassium Level            4.1


Chloride Level             100


Carbon Dioxide              31


Level


Anion Gap                  10  #


Blood Urea                 53  H


Nitrogen


Creatinine               2.17  H


Est Glomerular            30  L
  
              
              



Filtrat


Rate
mL/min


Glucose Level              114


Calcium Level              9.0


Troponin I               0.109


Bedside Glucose                           112            165               197








Subjective


24 Hr Interval Summary


Free Text/Dictation


Short of breath today.


Episode of vomiting this morning resolved





Exam/Review of Systems


Exam


Vitals





Vital Signs


  Date      Temp  Pulse  Resp  B/P (MAP)   Pulse Ox  O2          O2 Flow    FiO2


Time                                                 Delivery    Rate


   2/13/19           56


     13:32


   2/13/19  98.3           17      115/54        92


     11:25                           (74)


   2/13/19                                           Nasal


     07:26                                           Cannula


   2/13/19                                                            15.0   100


     05:40








Intake and Output





2/12/19 2/12/19 2/13/19





1515:00


23:00


07:00





IntakeIntake Total


640 ml


120 ml





OutputOutput Total


1310 ml


500 ml





BalanceBalance


-670 ml


-380 ml











Exam


ENERAL:  The patient is awake, alert, oriented and appears to be in mild 


distress secondary to shortness of breath.


HEENT:  Pupils equal, round and reactive to light.  Extraocular movements are 


intact.


NECK:  Supple.  JVD appreciated.


LUNGS:  crackles at bases


HEART:  Irregularly irregular.  No murmur, rub or gallop.


ABDOMEN:  Soft, somewhat distended with some abdominal wall edema.


EXTREMITIES:  Legs with 2 to 3+ pitting edema in bilateral lower extremity to 


mid-calf.


GENERAL:  Awake, alert, oriented and does not appear to in any acute distress.


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





Results


Results 24hrs





Laboratory Tests


Test
              2/12/19
17:14  2/12/19
20:23  2/13/19
04:51     2/13/19
05:01


Bedside Glucose            122            147            108


Blood Gas          
              
              
              Blood arterial



Specimen Source



Arterial Blood     
              
              
              2/13/2019
5:10:0


Date Drawn
                                                                 1 AM


Arterial Blood pH  
              
              
                      7.383  



(Temp
corrected)


Arterial Blood     
              
              
                      54.7  H



pCO2


(Temp
correct)


Arterial Blood     
              
              
                       90.1  



pO2


(Temp
corrected)


Arterial Blood                                                           31.9  H


HCO3


Arterial Blood                                                            5.5  H


Base Excess


Arterial Blood     
              
              
                       96.8  



Oxygen
Saturation


Matty Test                                                      ACCEPTAB


Arterial Blood     
              
              
              Right Radial  



Gas Puncture
Site


Arterial           
              
              
                        1.1  



Blood
Carboxyhemo


globin


Arterial Blood                                                             0.3


Methemoglobin


Blood Gas A-a O2                                                        568.2  H


Differential


Oxyhemoglobin                                                             95.4


Percent


Blood Gas                                                                 37.0


Temperature


Blood Gas Actual   
              
              
                         22  



Respiration
Rate


Blood Gas                                                       MASK - NRB


Modality


FiO2                                                                     100.0


Blood Gas                                                       MG


Notified Whom


Blood Gas          
              
              
              2/13/2019
5:30:0


Notified Time
                                                              9 AM


Test
              2/13/19
05:45  2/13/19
07:42  2/13/19
11:59     2/13/19
13:05


White Blood Count          5.4


Red Blood Count          4.52  L


Hemoglobin               11.0  L


Hematocrit               36.8  L


Mean Corpuscular         81.4  L


Volume


Mean Corpuscular         24.3  L


Hemoglobin


Mean Corpuscular        29.9  L
  
              
              



Hemoglobin
Concen


t


Red Cell                 18.5  H


Distribution


Width


Platelet Count             160


Mean Platelet            11.8  H


Volume


Immature                 0.200


Granulocytes %


Neutrophils %             65.1


Lymphocytes %             21.4


Monocytes %                9.8


Eosinophils %              2.0


Basophils %                1.5


Nucleated Red              0.0


Blood Cells %


Immature                 0.010


Granulocytes #


Neutrophils #              3.5


Lymphocytes #              1.2


Monocytes #                0.5


Eosinophils #              0.1


Basophils #                0.1


Nucleated Red              0.0


Blood Cells #


Sodium Level               141


Potassium Level            4.1


Chloride Level             100


Carbon Dioxide              31


Level


Anion Gap                  10  #


Blood Urea                 53  H


Nitrogen


Creatinine               2.17  H


Est Glomerular            30  L
  
              
              



Filtrat


Rate
mL/min


Glucose Level              114


Calcium Level              9.0


Troponin I               0.109


Bedside Glucose                           112            165               197








Medications


Medication





Current Medications


IV Flush (NS 3 ml) 3 ml PER PROTOCOL IV ;  Start 2/11/19 at 14:00


Ondansetron HCl (Zofran Inj) 4 mg Q6H  PRN IV NAUSEA/VOMITING Last administered 


on 2/13/19at 04:47; Admin Dose 4 MG;  Start 2/11/19 at 14:00


Acetaminophen (Tylenol Tab) 650 mg Q6H  PRN PO .PAIN 1-3 OR TEMP Last 


administered on 2/13/19at 13:07; Admin Dose 650 MG;  Start 2/11/19 at 14:00


Acetaminophen/ Hydrocodone Bitart (Norco (5/325)) 1 tab Q6H  PRN PO .MOD PAIN 4-


6 Last administered on 2/12/19at 13:01; Admin Dose 1 TAB;  Start 2/11/19 at 


14:00


Morphine Sulfate (morphine) 6 mg Q4H  PRN PO .SEVERE PAIN 7-10;  Start 2/11/19 


at 14:00


Docusate Sodium (Colace) 100 mg Q12H  PRN PO .CONSTIPATION;  Start 2/11/19 at 


14:00


Magnesium Hydroxide (Milk Of Mag) 30 ml DAILY  PRN PO .CONSTIPATION Last 


administered on 2/12/19at 18:09; Admin Dose 30 ML;  Start 2/11/19 at 14:00


Lorazepam (Ativan) 0.5 mg Q6H  PRN PO ANXIETY;  Start 2/11/19 at 14:00


Albuterol/ Ipratropium (Duoneb) 3 ml Q4H RESP THERAPY  PRN HHN SHORTNESS OF 


BREATH Last administered on 2/13/19at 05:39; Admin Dose 3 ML;  Start 2/11/19 at 


14:00


Hydralazine HCl (Apresoline) 10 mg Q6H  PRN IV ELEVATED BLOOD PRESSURE;  Start 


2/11/19 at 14:00


Nitroglycerin (Nitroglycerin (Sl Tab) 0.4 Mg) 1 tab Q5M  PRN SL ANGINA;  Start 


2/11/19 at 14:00


Apixaban (Eliquis) 5 mg BID PO  Last administered on 2/13/19at 08:42; Admin Dose


5 MG;  Start 2/11/19 at 21:00


Digoxin (Digoxin) 0.125 mg DAILY@1300 PO  Last administered on 2/13/19at 13:21; 


Admin Dose 0.125 MG;  Start 2/12/19 at 13:00


Doxazosin Mesylate (Cardura) 4 mg BID PO  Last administered on 2/13/19at 08:42; 


Admin Dose 4 MG;  Start 2/11/19 at 21:00


Ergocalciferol (Drisdol) 50,000 unit Q7D PO  Last administered on 2/11/19at 


18:03; Admin Dose 50,000 UNIT;  Start 2/11/19 at 14:00


Ferrous Sulfate (Ferrous Sulfate (Ec)) 325 mg BID PO  Last administered on 2 /13/19at 08:42; Admin Dose 325 MG;  Start 2/11/19 at 21:00


Insulin Glargine (Lantus) 12 units QHS SC  Last administered on 2/12/19at 20:32;


Admin Dose 12 UNITS;  Start 2/11/19 at 21:00


Famotidine (Pepcid) 20 mg DAILY PO  Last administered on 2/13/19at 08:42; Admin 


Dose 20 MG;  Start 2/12/19 at 09:00


Atorvastatin Calcium (Lipitor) 80 mg HS PO  Last administered on 2/12/19at 


20:26; Admin Dose 80 MG;  Start 2/11/19 at 21:00


Diagnostic Test (Pha) (Accu-Chek) 1 ea 02 XX ;  Start 2/12/19 at 02:00


Miscellaneous Information 1 ea NOTE XX ;  Start 2/11/19 at 14:30


Glucose (Glutose) 15 gm Q15M  PRN PO DECREASED GLUCOSE;  Start 2/11/19 at 14:30


Glucose (Glutose) 22.5 gm Q15M  PRN PO DECREASED GLUCOSE;  Start 2/11/19 at 


14:30


Dextrose (D50w Syringe) 25 ml Q15M  PRN IV DECREASED GLUCOSE;  Start 2/11/19 at 


14:30


Dextrose (D50w Syringe) 50 ml Q15M  PRN IV DECREASED GLUCOSE;  Start 2/11/19 at 


14:30


Glucagon (Glucagen) 1 mg Q15M  PRN IM DECREASED GLUCOSE;  Start 2/11/19 at 14:30


Glucose (Glutose) 15 gm Q15M  PRN BUCCAL DECREASED GLUCOSE;  Start 2/11/19 at 


14:30


Nicotine (Nicoderm 21 Mg/ 24hr) 1 patch DAILY TRANSDERM ;  Start 2/11/19 at 


16:30


Metoprolol Succinate (Toprol Xl) 25 mg DAILY PO  Last administered on 2/13/19at 


08:43; Admin Dose 25 MG;  Start 2/12/19 at 09:00


Patient Own Medication 1 ea BID PO  Last administered on 2/12/19at 08:09; Admin 


Dose 1 EA;  Start 2/11/19 at 21:00;  Status Hold


Famotidine (Pepcid) 20 mg Q24H PO  Last administered on 2/12/19at 20:26; Admin 


Dose 20 MG;  Start 2/11/19 at 22:00


Insulin Aspart (Novolog Insulin Pen) (Adult SC Insulin - Mild Algorithm)... AC 


MEALS AND  BEDTIME SC  Last administered on 2/13/19at 12:07; Admin Dose 1 UNIT; 


Start 2/12/19 at 17:25


Miscellaneous Information Patients own medicat... BID@10,16 XX ;  Start 2/13/19 


at 10:00


Furosemide (Lasix) 40 mg 0900,1300,1700 IV ;  Start 2/13/19 at 17:00











JOSE YANG MD              Feb 13, 2019 15:15

## 2019-02-13 NOTE — CONS
Assessment/Plan


Assessment/Plan


Hospital Course (Demo Recall)


IMP:


1.CHF- systolic acute on chronic


2.Cardiomyoopathy- EF 30%


3.CKD


4.HTN


5.Dyslipidemia





Recc:


-Tele


-Agree with increase in lasix dose and consider addition of metolazone to assure


good output


-continue bb/cardura and would consider addition of hydralazine afterload 


reduction in lieu of ACEI given renal failure


-Continue statin/Eliquis





Consultation Date/Type/Reason


Admit Date/Time


Feb 11, 2019 at 13:13


Initial Consult Date


2/11/19


Type of Consult


Cardiology


Reason for Consultation


CHF


Requesting Provider:  GORDO MCDERMOTT


Date/Time of Note


DATE: 2/13/19 


TIME: 16:01





Exam/Review of Systems


Vital Signs


Vitals





Vital Signs


  Date      Temp  Pulse  Resp  B/P (MAP)   Pulse Ox  O2          O2 Flow    FiO2


Time                                                 Delivery    Rate


   2/13/19  98.8     57    18      109/55        98


     15:21                           (73)


   2/13/19                                           Nasal


     07:26                                           Cannula


   2/13/19                                                            15.0   100


     05:40








Intake and Output





2/12/19 2/12/19 2/13/19





1515:00


23:00


07:00





IntakeIntake Total


640 ml


120 ml





OutputOutput Total


1310 ml


500 ml





BalanceBalance


-670 ml


-380 ml














Exam


Exam


Review of Systems:


CONSTITUTIONAL:  No fevers, chills.


PULMONARY:  No sob


CARDIOVASCULAR: No chest pain/palpitations


GASTROINTESTINAL:  No nausea/vomiting.


GENITOURINARY:  No hematuria/dysuria.


MUSCULOSKELETAL:  No myagias/arthalgias.


PSYCHIATRIC:  The patient denies depression.


NEUROLOGIC:   No weakness


Constitutional:  alert


Psych:  no complaints


Head:  normocephalic


ENMT:  mucosa pink and moist


Neck:  supple, jvd (9 cm water)


Respiratory:  diminished breath sounds (at bases/B)


Cardiovascular:  regular rate and rhythm


Gastrointestinal:  soft, non-tender


Musculoskeletal:  muscle tone (normal)


Extremities:  pitting pedal edema (bilteral)


Neurological:  other (No focal deficits)





Labs


Result Diagram:  


2/13/19 0545                                                                    


           2/13/19 0545





Results 24hrs





Laboratory Tests


Test
              2/12/19
17:14  2/12/19
20:23  2/13/19
04:51     2/13/19
05:01


Bedside Glucose            122            147            108


Blood Gas          
              
              
              Blood arterial



Specimen Source



Arterial Blood     
              
              
              2/13/2019
5:10:0


Date Drawn
                                                                 1 AM


Arterial Blood pH  
              
              
                      7.383  



(Temp
corrected)


Arterial Blood     
              
              
                      54.7  H



pCO2


(Temp
correct)


Arterial Blood     
              
              
                       90.1  



pO2


(Temp
corrected)


Arterial Blood                                                           31.9  H


HCO3


Arterial Blood                                                            5.5  H


Base Excess


Arterial Blood     
              
              
                       96.8  



Oxygen
Saturation


Matty Test                                                      ACCEPTAB


Arterial Blood     
              
              
              Right Radial  



Gas Puncture
Site


Arterial           
              
              
                        1.1  



Blood
Carboxyhemo


globin


Arterial Blood                                                             0.3


Methemoglobin


Blood Gas A-a O2                                                        568.2  H


Differential


Oxyhemoglobin                                                             95.4


Percent


Blood Gas                                                                 37.0


Temperature


Blood Gas Actual   
              
              
                         22  



Respiration
Rate


Blood Gas                                                       MASK - NRB


Modality


FiO2                                                                     100.0


Blood Gas                                                       MG


Notified Whom


Blood Gas          
              
              
              2/13/2019
5:30:0


Notified Time
                                                              9 AM


Test
              2/13/19
05:45  2/13/19
07:42  2/13/19
11:59     2/13/19
13:05


White Blood Count          5.4


Red Blood Count          4.52  L


Hemoglobin               11.0  L


Hematocrit               36.8  L


Mean Corpuscular         81.4  L


Volume


Mean Corpuscular         24.3  L


Hemoglobin


Mean Corpuscular        29.9  L
  
              
              



Hemoglobin
Concen


t


Red Cell                 18.5  H


Distribution


Width


Platelet Count             160


Mean Platelet            11.8  H


Volume


Immature                 0.200


Granulocytes %


Neutrophils %             65.1


Lymphocytes %             21.4


Monocytes %                9.8


Eosinophils %              2.0


Basophils %                1.5


Nucleated Red              0.0


Blood Cells %


Immature                 0.010


Granulocytes #


Neutrophils #              3.5


Lymphocytes #              1.2


Monocytes #                0.5


Eosinophils #              0.1


Basophils #                0.1


Nucleated Red              0.0


Blood Cells #


Sodium Level               141


Potassium Level            4.1


Chloride Level             100


Carbon Dioxide              31


Level


Anion Gap                  10  #


Blood Urea                 53  H


Nitrogen


Creatinine               2.17  H


Est Glomerular            30  L
  
              
              



Filtrat


Rate
mL/min


Glucose Level              114


Calcium Level              9.0


Troponin I               0.109


Bedside Glucose                           112            165               197








Medications


Medications





Current Medications


IV Flush (NS 3 ml) 3 ml PER PROTOCOL IV ;  Start 2/11/19 at 14:00


Ondansetron HCl (Zofran Inj) 4 mg Q6H  PRN IV NAUSEA/VOMITING Last administered 


on 2/13/19at 04:47; Admin Dose 4 MG;  Start 2/11/19 at 14:00


Acetaminophen (Tylenol Tab) 650 mg Q6H  PRN PO .PAIN 1-3 OR TEMP Last 


administered on 2/13/19at 13:07; Admin Dose 650 MG;  Start 2/11/19 at 14:00


Acetaminophen/ Hydrocodone Bitart (Norco (5/325)) 1 tab Q6H  PRN PO .MOD PAIN 4-


6 Last administered on 2/12/19at 13:01; Admin Dose 1 TAB;  Start 2/11/19 at 


14:00


Morphine Sulfate (morphine) 6 mg Q4H  PRN PO .SEVERE PAIN 7-10;  Start 2/11/19 


at 14:00


Docusate Sodium (Colace) 100 mg Q12H  PRN PO .CONSTIPATION;  Start 2/11/19 at 


14:00


Magnesium Hydroxide (Milk Of Mag) 30 ml DAILY  PRN PO .CONSTIPATION Last adminis


tered on 2/12/19at 18:09; Admin Dose 30 ML;  Start 2/11/19 at 14:00


Lorazepam (Ativan) 0.5 mg Q6H  PRN PO ANXIETY;  Start 2/11/19 at 14:00


Albuterol/ Ipratropium (Duoneb) 3 ml Q4H RESP THERAPY  PRN HHN SHORTNESS OF 


BREATH Last administered on 2/13/19at 05:39; Admin Dose 3 ML;  Start 2/11/19 at 


14:00


Hydralazine HCl (Apresoline) 10 mg Q6H  PRN IV ELEVATED BLOOD PRESSURE;  Start 


2/11/19 at 14:00


Nitroglycerin (Nitroglycerin (Sl Tab) 0.4 Mg) 1 tab Q5M  PRN SL ANGINA;  Start 


2/11/19 at 14:00


Apixaban (Eliquis) 5 mg BID PO  Last administered on 2/13/19at 08:42; Admin Dose


5 MG;  Start 2/11/19 at 21:00


Digoxin (Digoxin) 0.125 mg DAILY@1300 PO  Last administered on 2/13/19at 13:21; 


Admin Dose 0.125 MG;  Start 2/12/19 at 13:00


Doxazosin Mesylate (Cardura) 4 mg BID PO  Last administered on 2/13/19at 08:42; 


Admin Dose 4 MG;  Start 2/11/19 at 21:00


Ergocalciferol (Drisdol) 50,000 unit Q7D PO  Last administered on 2/11/19at 


18:03; Admin Dose 50,000 UNIT;  Start 2/11/19 at 14:00


Ferrous Sulfate (Ferrous Sulfate (Ec)) 325 mg BID PO  Last administered on 


2/13/19at 08:42; Admin Dose 325 MG;  Start 2/11/19 at 21:00


Insulin Glargine (Lantus) 12 units QHS SC  Last administered on 2/12/19at 20:32;


Admin Dose 12 UNITS;  Start 2/11/19 at 21:00


Famotidine (Pepcid) 20 mg DAILY PO  Last administered on 2/13/19at 08:42; Admin 


Dose 20 MG;  Start 2/12/19 at 09:00


Atorvastatin Calcium (Lipitor) 80 mg HS PO  Last administered on 2/12/19at 


20:26; Admin Dose 80 MG;  Start 2/11/19 at 21:00


Diagnostic Test (Pha) (Accu-Chek) 1 ea 02 XX ;  Start 2/12/19 at 02:00


Miscellaneous Information 1 ea NOTE XX ;  Start 2/11/19 at 14:30


Glucose (Glutose) 15 gm Q15M  PRN PO DECREASED GLUCOSE;  Start 2/11/19 at 14:30


Glucose (Glutose) 22.5 gm Q15M  PRN PO DECREASED GLUCOSE;  Start 2/11/19 at 


14:30


Dextrose (D50w Syringe) 25 ml Q15M  PRN IV DECREASED GLUCOSE;  Start 2/11/19 at 


14:30


Dextrose (D50w Syringe) 50 ml Q15M  PRN IV DECREASED GLUCOSE;  Start 2/11/19 at 


14:30


Glucagon (Glucagen) 1 mg Q15M  PRN IM DECREASED GLUCOSE;  Start 2/11/19 at 14:30


Glucose (Glutose) 15 gm Q15M  PRN BUCCAL DECREASED GLUCOSE;  Start 2/11/19 at 


14:30


Nicotine (Nicoderm 21 Mg/ 24hr) 1 patch DAILY TRANSDERM ;  Start 2/11/19 at 


16:30


Metoprolol Succinate (Toprol Xl) 25 mg DAILY PO  Last administered on 2/13/19at 


08:43; Admin Dose 25 MG;  Start 2/12/19 at 09:00


Patient Own Medication 1 ea BID PO  Last administered on 2/12/19at 08:09; Admin 


Dose 1 EA;  Start 2/11/19 at 21:00;  Status Hold


Famotidine (Pepcid) 20 mg Q24H PO  Last administered on 2/12/19at 20:26; Admin 


Dose 20 MG;  Start 2/11/19 at 22:00


Insulin Aspart (Novolog Insulin Pen) (Adult SC Insulin - Mild Algorithm)... AC 


MEALS AND  BEDTIME SC  Last administered on 2/13/19at 12:07; Admin Dose 1 UNIT; 


Start 2/12/19 at 17:25


Miscellaneous Information Patients own medicat... BID@10,16 XX ;  Start 2/13/19 


at 10:00


Furosemide (Lasix) 40 mg 0900,1300,1700 IV ;  Start 2/13/19 at 17:00











ROSANNE WERNER               Feb 13, 2019 16:13

## 2019-02-13 NOTE — PN
Date/Time of Note


Date/Time of Note


DATE: 2/13/19 


TIME: 11:00





Assessment/Plan


VTE Prophylaxis


Risk score (from Ns)>0 risk:  5


SCD applied (from Ns):  No


SCD contraindicated:  other


Pharmacological prophylaxis:  apixaban





Lines/Catheters


Urinary Cath still in place:  No





Assessment/Plan


Hospital Course


S: Patient had an episode of increased shortness of breath earlier this morning,


given Lasix orally for that.  Did require nonrebreather for a couple of hours, 


now back on 4 L nasal cannula presently does not have any increased shortness of


breath symptoms.  He did complain of some vomiting symptoms earlier, and chest 


x-ray this morning did show some increased pulmonary edema.





O: VS - see below


PHYSICAL EXAMINATION:


GENERAL:  lying in bed, in no acute distress.


HEENT:  Pupils equal, round and reactive to light.  Extraocular muscles are 


intact.


NECK:  Supple, no thyromegaly.


LUNGS:  Distant breath sounds bilaterally, mild crackles heard at the bases.


CARDIOVASCULAR:  Irregularly irregular heart rate.  No rubs or gallops.


ABDOMEN:  Soft, nontender and nondistended.  Normal bowel sounds.  No rebound or


guarding.


MUSCULOSKELETAL:  2+ pitting edema in bilateral lower extremities to the mid 


calves.


NEUROLOGIC:  No focal deficits.


 





 


ASSESSMENT AND PLAN: 68-year-old male coming in with chest pressure, shortness 


of breath, lower extremity swelling, signs of congestive heart failure 


exacerbation, also rule out for acute coronary syndrome.





1.  Shortness of breath and chest pressure -appeared to be slowly improving, 


although patient did have some increased shortness of breath symptoms earlier 


this morning as mentioned above.


   -Continue fluid restriction as recommended by cardiology and renal teams, 


continue twice daily diurese as well with Lasix (perhaps consider increasing the


dose now to 60 given the chest x-ray findings this morning?), low-dose beta-


blocker


   -Continue to keep the head of the bed greater than 30 degrees


   - follow up Cardiology recommendations and continue digoxin for now.


   -Off of Entresto presently while in-house


2. Acute on chronic kidney disease.  Again, the kidney function appears to be 


slightly worse than when he was here 2 months ago.  Creatinine slightly 


decreased from yesterday.  Patient having adequate urine output


   -Follow-up recommendations renal consult


   -Continue to monitor BUN and creatinine levels and urine output. 


3.  Smoking history.  


   -Counseled on cessation. 


   -Continue nicotine patch.


4.  History of hypertension.  Again, see above.  


   - Continue current medications.  


   - Also hydralazine p.r.n.


5.  History of atrial fibrillation -appears stable


   -Monitor, continue Eliquis as well.


6.  Type 2 diabetes.  A1c equals 9.0, sugars presently stable 


   - continue Lantus and sliding scale insulin.


7.  Deep venous thrombosis prophylaxis-on Eliquis.


Result Diagram:  


2/13/19 0545                                                                    


           2/13/19 0545





Results 24hrs





Laboratory Tests


Test
                 2/12/19
11:55  2/12/19
17:14  2/12/19
20:23  2/13/19
04:51


Bedside Glucose               120            122            147            108


Test
                 2/13/19
05:01  2/13/19
05:45  2/13/19
07:42  



Blood Gas          Blood arterial
   
              
              



Specimen Source



Arterial Blood     2/13/2019
5:10:0  
              
              



Date Drawn
                    1 AM


Arterial Blood pH          7.383  
  
              
              



(Temp
corrected)


Arterial Blood             54.7  H
  
              
              



pCO2


(Temp
correct)


Arterial Blood              90.1  
  
              
              



pO2


(Temp
corrected)


Arterial Blood              31.9  H


HCO3


Arterial Blood               5.5  H


Base Excess


Arterial Blood              96.8  
  
              
              



Oxygen
Saturation


Matty Test         ACCEPTAB


Arterial Blood     Right Radial  
   
              
              



Gas Puncture
Site


Arterial                     1.1  
  
              
              



Blood
Carboxyhemo


globin


Arterial Blood                0.3


Methemoglobin


Blood Gas A-a O2           568.2  H


Differential


Oxyhemoglobin                95.4


Percent


Blood Gas                    37.0


Temperature


Blood Gas Actual              22  
  
              
              



Respiration
Rate


Blood Gas          MASK - NRB


Modality


FiO2                        100.0


Blood Gas          MG


Notified Whom


Blood Gas          2/13/2019
5:30:0  
              
              



Notified Time
                 9 AM


White Blood Count                            5.4


Red Blood Count                            4.52  L


Hemoglobin                                 11.0  L


Hematocrit                                 36.8  L


Mean Corpuscular                           81.4  L


Volume


Mean Corpuscular                           24.3  L


Hemoglobin


Mean Corpuscular   
                      29.9  L
  
              



Hemoglobin
Concen


t


Red Cell                                   18.5  H


Distribution


Width


Platelet Count                               160


Mean Platelet                              11.8  H


Volume


Immature                                   0.200


Granulocytes %


Neutrophils %                               65.1


Lymphocytes %                               21.4


Monocytes %                                  9.8


Eosinophils %                                2.0


Basophils %                                  1.5


Nucleated Red                                0.0


Blood Cells %


Immature                                   0.010


Granulocytes #


Neutrophils #                                3.5


Lymphocytes #                                1.2


Monocytes #                                  0.5


Eosinophils #                                0.1


Basophils #                                  0.1


Nucleated Red                                0.0


Blood Cells #


Sodium Level                                 141


Potassium Level                              4.1


Chloride Level                               100


Carbon Dioxide                                31


Level


Anion Gap                                    10  #


Blood Urea                                   53  H


Nitrogen


Creatinine                                 2.17  H


Est Glomerular     
                        30  L
  
              



Filtrat


Rate
mL/min


Glucose Level                                114


Calcium Level                                9.0


Troponin I                                 0.109


Bedside Glucose                                             112








Exam/Review of Systems


Exam


Vitals





Vital Signs


  Date      Temp  Pulse  Resp  B/P (MAP)   Pulse Ox  O2          O2 Flow    FiO2


Time                                                 Delivery    Rate


   2/13/19           63


     08:46


   2/13/19  97.8           18      130/60       100  Nasal


     07:26                           (83)            Cannula


   2/13/19                                                            15.0   100


     05:40








Intake and Output





2/12/19 2/12/19 2/13/19





1515:00


23:00


07:00





IntakeIntake Total


640 ml


120 ml





OutputOutput Total


1310 ml


500 ml





BalanceBalance


-670 ml


-380 ml














Results


Results 24hrs





Laboratory Tests


Test
                 2/12/19
11:55  2/12/19
17:14  2/12/19
20:23  2/13/19
04:51


Bedside Glucose               120            122            147            108


Test
                 2/13/19
05:01  2/13/19
05:45  2/13/19
07:42  



Blood Gas          Blood arterial
   
              
              



Specimen Source



Arterial Blood     2/13/2019
5:10:0  
              
              



Date Drawn
                    1 AM


Arterial Blood pH          7.383  
  
              
              



(Temp
corrected)


Arterial Blood             54.7  H
  
              
              



pCO2


(Temp
correct)


Arterial Blood              90.1  
  
              
              



pO2


(Temp
corrected)


Arterial Blood              31.9  H


HCO3


Arterial Blood               5.5  H


Base Excess


Arterial Blood              96.8  
  
              
              



Oxygen
Saturation


Matty Test         ACCEPTAB


Arterial Blood     Right Radial  
   
              
              



Gas Puncture
Site


Arterial                     1.1  
  
              
              



Blood
Carboxyhemo


globin


Arterial Blood                0.3


Methemoglobin


Blood Gas A-a O2           568.2  H


Differential


Oxyhemoglobin                95.4


Percent


Blood Gas                    37.0


Temperature


Blood Gas Actual              22  
  
              
              



Respiration
Rate


Blood Gas          MASK - NRB


Modality


FiO2                        100.0


Blood Gas          MG


Notified Whom


Blood Gas          2/13/2019
5:30:0  
              
              



Notified Time
                 9 AM


White Blood Count                            5.4


Red Blood Count                            4.52  L


Hemoglobin                                 11.0  L


Hematocrit                                 36.8  L


Mean Corpuscular                           81.4  L


Volume


Mean Corpuscular                           24.3  L


Hemoglobin


Mean Corpuscular   
                      29.9  L
  
              



Hemoglobin
Concen


t


Red Cell                                   18.5  H


Distribution


Width


Platelet Count                               160


Mean Platelet                              11.8  H


Volume


Immature                                   0.200


Granulocytes %


Neutrophils %                               65.1


Lymphocytes %                               21.4


Monocytes %                                  9.8


Eosinophils %                                2.0


Basophils %                                  1.5


Nucleated Red                                0.0


Blood Cells %


Immature                                   0.010


Granulocytes #


Neutrophils #                                3.5


Lymphocytes #                                1.2


Monocytes #                                  0.5


Eosinophils #                                0.1


Basophils #                                  0.1


Nucleated Red                                0.0


Blood Cells #


Sodium Level                                 141


Potassium Level                              4.1


Chloride Level                               100


Carbon Dioxide                                31


Level


Anion Gap                                    10  #


Blood Urea                                   53  H


Nitrogen


Creatinine                                 2.17  H


Est Glomerular     
                        30  L
  
              



Filtrat


Rate
mL/min


Glucose Level                                114


Calcium Level                                9.0


Troponin I                                 0.109


Bedside Glucose                                             112








Medications


Medication





Current Medications


IV Flush (NS 3 ml) 3 ml PER PROTOCOL IV ;  Start 2/11/19 at 14:00


Ondansetron HCl (Zofran Inj) 4 mg Q6H  PRN IV NAUSEA/VOMITING Last administered 


on 2/13/19at 04:47; Admin Dose 4 MG;  Start 2/11/19 at 14:00


Acetaminophen (Tylenol Tab) 650 mg Q6H  PRN PO .PAIN 1-3 OR TEMP Last administ


ered on 2/11/19at 22:02; Admin Dose 650 MG;  Start 2/11/19 at 14:00


Acetaminophen/ Hydrocodone Bitart (Norco (5/325)) 1 tab Q6H  PRN PO .MOD PAIN 4-


6 Last administered on 2/12/19at 13:01; Admin Dose 1 TAB;  Start 2/11/19 at 


14:00


Morphine Sulfate (morphine) 6 mg Q4H  PRN PO .SEVERE PAIN 7-10;  Start 2/11/19 


at 14:00


Docusate Sodium (Colace) 100 mg Q12H  PRN PO .CONSTIPATION;  Start 2/11/19 at 


14:00


Magnesium Hydroxide (Milk Of Mag) 30 ml DAILY  PRN PO .CONSTIPATION Last 


administered on 2/12/19at 18:09; Admin Dose 30 ML;  Start 2/11/19 at 14:00


Lorazepam (Ativan) 0.5 mg Q6H  PRN PO ANXIETY;  Start 2/11/19 at 14:00


Albuterol/ Ipratropium (Duoneb) 3 ml Q4H RESP THERAPY  PRN HHN SHORTNESS OF PAWAN


ATH Last administered on 2/13/19at 05:39; Admin Dose 3 ML;  Start 2/11/19 at 


14:00


Hydralazine HCl (Apresoline) 10 mg Q6H  PRN IV ELEVATED BLOOD PRESSURE;  Start 


2/11/19 at 14:00


Nitroglycerin (Nitroglycerin (Sl Tab) 0.4 Mg) 1 tab Q5M  PRN SL ANGINA;  Start 


2/11/19 at 14:00


Apixaban (Eliquis) 5 mg BID PO  Last administered on 2/13/19at 08:42; Admin Dose


5 MG;  Start 2/11/19 at 21:00


Digoxin (Digoxin) 0.125 mg DAILY@1300 PO  Last administered on 2/12/19at 12:42; 


Admin Dose 0.125 MG;  Start 2/12/19 at 13:00


Doxazosin Mesylate (Cardura) 4 mg BID PO  Last administered on 2/13/19at 08:42; 


Admin Dose 4 MG;  Start 2/11/19 at 21:00


Ergocalciferol (Drisdol) 50,000 unit Q7D PO  Last administered on 2/11/19at 


18:03; Admin Dose 50,000 UNIT;  Start 2/11/19 at 14:00


Ferrous Sulfate (Ferrous Sulfate (Ec)) 325 mg BID PO  Last administered on 


2/13/19at 08:42; Admin Dose 325 MG;  Start 2/11/19 at 21:00


Insulin Glargine (Lantus) 12 units QHS SC  Last administered on 2/12/19at 20:32;


Admin Dose 12 UNITS;  Start 2/11/19 at 21:00


Famotidine (Pepcid) 20 mg DAILY PO  Last administered on 2/13/19at 08:42; Admin 


Dose 20 MG;  Start 2/12/19 at 09:00


Atorvastatin Calcium (Lipitor) 80 mg HS PO  Last administered on 2/12/19at 


20:26; Admin Dose 80 MG;  Start 2/11/19 at 21:00


Diagnostic Test (Pha) (Accu-Chek) 1 ea 02 XX ;  Start 2/12/19 at 02:00


Miscellaneous Information 1 ea NOTE XX ;  Start 2/11/19 at 14:30


Glucose (Glutose) 15 gm Q15M  PRN PO DECREASED GLUCOSE;  Start 2/11/19 at 14:30


Glucose (Glutose) 22.5 gm Q15M  PRN PO DECREASED GLUCOSE;  Start 2/11/19 at 


14:30


Dextrose (D50w Syringe) 25 ml Q15M  PRN IV DECREASED GLUCOSE;  Start 2/11/19 at 


14:30


Dextrose (D50w Syringe) 50 ml Q15M  PRN IV DECREASED GLUCOSE;  Start 2/11/19 at 


14:30


Glucagon (Glucagen) 1 mg Q15M  PRN IM DECREASED GLUCOSE;  Start 2/11/19 at 14:30


Glucose (Glutose) 15 gm Q15M  PRN BUCCAL DECREASED GLUCOSE;  Start 2/11/19 at 


14:30


Nicotine (Nicoderm 21 Mg/ 24hr) 1 patch DAILY TRANSDERM ;  Start 2/11/19 at 


16:30


Metoprolol Succinate (Toprol Xl) 25 mg DAILY PO  Last administered on 2/13/19at 


08:43; Admin Dose 25 MG;  Start 2/12/19 at 09:00


Furosemide (Lasix) 40 mg BID IV  Last administered on 2/13/19at 05:20; Admin 


Dose 40 MG;  Start 2/12/19 at 09:00


Patient Own Medication 1 ea BID PO  Last administered on 2/12/19at 08:09; Admin 


Dose 1 EA;  Start 2/11/19 at 21:00;  Status Hold


Famotidine (Pepcid) 20 mg Q24H PO  Last administered on 2/12/19at 20:26; Admin 


Dose 20 MG;  Start 2/11/19 at 22:00


Insulin Aspart (Novolog Insulin Pen) (Adult SC Insulin - Mild Algorithm)... AC 


MEALS AND  BEDTIME SC ;  Start 2/12/19 at 17:25


Miscellaneous Information Patients own medicat... BID@10,16 XX ;  Start 2/13/19 


at 10:00











GORDO MCDERMOTT              Feb 13, 2019 11:03

## 2019-02-14 VITALS — HEART RATE: 60 BPM | DIASTOLIC BLOOD PRESSURE: 59 MMHG | SYSTOLIC BLOOD PRESSURE: 106 MMHG | RESPIRATION RATE: 18 BRPM

## 2019-02-14 VITALS — HEART RATE: 56 BPM | DIASTOLIC BLOOD PRESSURE: 58 MMHG | RESPIRATION RATE: 22 BRPM | SYSTOLIC BLOOD PRESSURE: 99 MMHG

## 2019-02-14 VITALS — HEART RATE: 62 BPM

## 2019-02-14 VITALS — RESPIRATION RATE: 22 BRPM | HEART RATE: 55 BPM | SYSTOLIC BLOOD PRESSURE: 112 MMHG | DIASTOLIC BLOOD PRESSURE: 56 MMHG

## 2019-02-14 VITALS — HEART RATE: 48 BPM

## 2019-02-14 VITALS — RESPIRATION RATE: 18 BRPM | HEART RATE: 58 BPM | DIASTOLIC BLOOD PRESSURE: 58 MMHG | SYSTOLIC BLOOD PRESSURE: 101 MMHG

## 2019-02-14 VITALS — DIASTOLIC BLOOD PRESSURE: 55 MMHG | HEART RATE: 71 BPM | SYSTOLIC BLOOD PRESSURE: 96 MMHG | RESPIRATION RATE: 18 BRPM

## 2019-02-14 VITALS — HEART RATE: 60 BPM

## 2019-02-14 VITALS — DIASTOLIC BLOOD PRESSURE: 64 MMHG | RESPIRATION RATE: 22 BRPM | SYSTOLIC BLOOD PRESSURE: 112 MMHG | HEART RATE: 56 BPM

## 2019-02-14 VITALS — SYSTOLIC BLOOD PRESSURE: 107 MMHG | DIASTOLIC BLOOD PRESSURE: 55 MMHG | RESPIRATION RATE: 18 BRPM | HEART RATE: 69 BPM

## 2019-02-14 VITALS — HEART RATE: 132 BPM

## 2019-02-14 VITALS — HEART RATE: 57 BPM

## 2019-02-14 VITALS — HEART RATE: 51 BPM

## 2019-02-14 LAB
ADD MAN DIFF?: NO
ANION GAP: 11 (ref 5–13)
BASOPHIL #: 0.1 10^3/UL (ref 0–0.1)
BASOPHILS %: 0.8 % (ref 0–2)
BLOOD UREA NITROGEN: 56 MG/DL (ref 7–20)
CALCIUM: 9 MG/DL (ref 8.4–10.2)
CARBON DIOXIDE: 34 MMOL/L (ref 21–31)
CHLORIDE: 97 MMOL/L (ref 97–110)
CREATININE: 2.51 MG/DL (ref 0.61–1.24)
EOSINOPHILS #: 0.2 10^3/UL (ref 0–0.5)
EOSINOPHILS %: 2.9 % (ref 0–7)
GLUCOSE: 68 MG/DL (ref 70–220)
HEMATOCRIT: 36.7 % (ref 42–52)
HEMOGLOBIN: 10.9 G/DL (ref 14–18)
IMMATURE GRANS #M: 0.02 10^3/UL (ref 0–0.03)
IMMATURE GRANS % (M): 0.3 % (ref 0–0.43)
LYMPHOCYTES #: 1 10^3/UL (ref 0.8–2.9)
LYMPHOCYTES %: 17 % (ref 15–51)
MEAN CORPUSCULAR HEMOGLOBIN: 24.8 PG (ref 29–33)
MEAN CORPUSCULAR HGB CONC: 29.7 G/DL (ref 32–37)
MEAN CORPUSCULAR VOLUME: 83.4 FL (ref 82–101)
MEAN PLATELET VOLUME: 12.3 FL (ref 7.4–10.4)
MONOCYTE #: 0.6 10^3/UL (ref 0.3–0.9)
MONOCYTES %: 9 % (ref 0–11)
NEUTROPHIL #: 4.3 10^3/UL (ref 1.6–7.5)
NEUTROPHILS %: 70 % (ref 39–77)
NUCLEATED RED BLOOD CELLS #: 0 10^3/UL (ref 0–0)
NUCLEATED RED BLOOD CELLS%: 0 /100WBC (ref 0–0)
PLATELET COUNT: 178 10^3/UL (ref 140–415)
POTASSIUM: 4.1 MMOL/L (ref 3.5–5.1)
RED BLOOD COUNT: 4.4 10^6/UL (ref 4.7–6.1)
RED CELL DISTRIBUTION WIDTH: 18.5 % (ref 11.5–14.5)
SODIUM: 142 MMOL/L (ref 135–144)
WHITE BLOOD COUNT: 6.1 10^3/UL (ref 4.8–10.8)

## 2019-02-14 RX ADMIN — DOXAZOSIN SCH MG: 4 TABLET ORAL at 21:00

## 2019-02-14 RX ADMIN — APIXABAN SCH MG: 5 TABLET, FILM COATED ORAL at 21:02

## 2019-02-14 RX ADMIN — IPRATROPIUM BROMIDE AND ALBUTEROL SULFATE 1 ML: .5; 3 SOLUTION RESPIRATORY (INHALATION) at 23:35

## 2019-02-14 RX ADMIN — INSULIN ASPART 1 UNIT: 100 INJECTION, SOLUTION INTRAVENOUS; SUBCUTANEOUS at 21:15

## 2019-02-14 RX ADMIN — DOXAZOSIN SCH MG: 4 TABLET ORAL at 08:38

## 2019-02-14 RX ADMIN — NICOTINE SCH PATCH: 21 PATCH, EXTENDED RELEASE TRANSDERMAL at 09:00

## 2019-02-14 RX ADMIN — FAMOTIDINE SCH MG: 20 TABLET ORAL at 08:37

## 2019-02-14 RX ADMIN — DOXAZOSIN 1 MG: 4 TABLET ORAL at 08:38

## 2019-02-14 RX ADMIN — FERROUS SULFATE TAB 325 MG (65 MG ELEMENTAL FE) SCH MG: 325 (65 FE) TAB at 21:02

## 2019-02-14 RX ADMIN — IPRATROPIUM BROMIDE AND ALBUTEROL SULFATE PRN ML: .5; 3 SOLUTION RESPIRATORY (INHALATION) at 23:35

## 2019-02-14 RX ADMIN — METOPROLOL SUCCINATE 1 MG: 25 TABLET, EXTENDED RELEASE ORAL at 08:39

## 2019-02-14 RX ADMIN — FUROSEMIDE 1 MG: 10 INJECTION, SOLUTION INTRAVENOUS at 08:39

## 2019-02-14 RX ADMIN — FAMOTIDINE 1 MG: 20 TABLET ORAL at 21:02

## 2019-02-14 RX ADMIN — APIXABAN 1 MG: 5 TABLET, FILM COATED ORAL at 21:02

## 2019-02-14 RX ADMIN — NICOTINE 1 PATCH: 21 PATCH, EXTENDED RELEASE TRANSDERMAL at 09:00

## 2019-02-14 RX ADMIN — DIGOXIN 1 MG: 125 TABLET ORAL at 13:00

## 2019-02-14 RX ADMIN — DIGOXIN SCH MG: 125 TABLET ORAL at 13:00

## 2019-02-14 RX ADMIN — METOLAZONE 1 MG: 2.5 TABLET ORAL at 21:00

## 2019-02-14 RX ADMIN — FERROUS SULFATE TAB 325 MG (65 MG ELEMENTAL FE) SCH MG: 325 (65 FE) TAB at 08:38

## 2019-02-14 RX ADMIN — INSULIN ASPART 1 UNIT: 100 INJECTION, SOLUTION INTRAVENOUS; SUBCUTANEOUS at 11:20

## 2019-02-14 RX ADMIN — INSULIN ASPART 1 UNIT: 100 INJECTION, SOLUTION INTRAVENOUS; SUBCUTANEOUS at 17:46

## 2019-02-14 RX ADMIN — APIXABAN 1 MG: 5 TABLET, FILM COATED ORAL at 08:37

## 2019-02-14 RX ADMIN — INSULIN GLARGINE 1 UNITS: 100 INJECTION, SOLUTION SUBCUTANEOUS at 21:15

## 2019-02-14 RX ADMIN — FERROUS SULFATE TAB 325 MG (65 MG ELEMENTAL FE) 1 MG: 325 (65 FE) TAB at 08:38

## 2019-02-14 RX ADMIN — FERROUS SULFATE TAB 325 MG (65 MG ELEMENTAL FE) 1 MG: 325 (65 FE) TAB at 21:02

## 2019-02-14 RX ADMIN — FAMOTIDINE SCH MG: 20 TABLET ORAL at 21:02

## 2019-02-14 RX ADMIN — FUROSEMIDE 1 MG: 10 INJECTION, SOLUTION INTRAVENOUS at 17:45

## 2019-02-14 RX ADMIN — FAMOTIDINE 1 MG: 20 TABLET ORAL at 08:37

## 2019-02-14 RX ADMIN — INSULIN GLARGINE SCH UNITS: 100 INJECTION, SOLUTION SUBCUTANEOUS at 21:15

## 2019-02-14 RX ADMIN — ATORVASTATIN CALCIUM 1 MG: 80 TABLET, FILM COATED ORAL at 21:02

## 2019-02-14 RX ADMIN — METOLAZONE 1 MG: 2.5 TABLET ORAL at 09:00

## 2019-02-14 RX ADMIN — DEXTROSE 1 GM: 15 GEL ORAL at 08:36

## 2019-02-14 RX ADMIN — FUROSEMIDE 1 MG: 10 INJECTION, SOLUTION INTRAVENOUS at 14:00

## 2019-02-14 RX ADMIN — APIXABAN SCH MG: 5 TABLET, FILM COATED ORAL at 08:37

## 2019-02-14 RX ADMIN — INSULIN ASPART 1 UNIT: 100 INJECTION, SOLUTION INTRAVENOUS; SUBCUTANEOUS at 07:25

## 2019-02-14 RX ADMIN — METOPROLOL SUCCINATE SCH MG: 25 TABLET, EXTENDED RELEASE ORAL at 08:39

## 2019-02-14 RX ADMIN — DOXAZOSIN 1 MG: 4 TABLET ORAL at 21:00

## 2019-02-14 RX ADMIN — ATORVASTATIN CALCIUM SCH MG: 80 TABLET, FILM COATED ORAL at 21:02

## 2019-02-14 NOTE — PN
Date/Time of Note


Date/Time of Note


DATE: 2/14/19 


TIME: 12:20





Assessment/Plan


VTE Prophylaxis


Risk score (from Ns)>0 risk:  5


SCD applied (from Ns):  No


SCD contraindicated:  other


Pharmacological prophylaxis:  apixaban





Lines/Catheters


IV Catheter Type (from [facility]):  Peripheral IV


Urinary Cath still in place:  No





Assessment/Plan


Hospital Course


S: Patient states less shortness of breath today.  Seen by cardiology team 


earlier today.





O: VS - see below


PHYSICAL EXAMINATION:


GENERAL:  lying in bed, in no acute distress.


HEENT:  Pupils equal, round and reactive to light.  Extraocular muscles are 


intact.


NECK:  Supple, no thyromegaly.


LUNGS:  Distant breath sounds bilaterally, less crackles heard at the bases.


CARDIOVASCULAR:  Irregularly irregular heart rate.  No rubs or gallops.


ABDOMEN:  Soft, nontender and nondistended.  Normal bowel sounds.  No rebound or


guarding.


MUSCULOSKELETAL:  1+ pitting edema in bilateral lower extremities to the mid 


calves.


NEUROLOGIC:  No focal deficits.


 





 


ASSESSMENT AND PLAN: 68-year-old male coming in with chest pressure, shortness 


of breath, lower extremity swelling, signs of congestive heart failure 


exacerbation, also rule out for acute coronary syndrome.





1.  Shortness of breath and chest pressure -overall slowly improving now.


   -Continue fluid restriction as recommended by cardiology and renal teams, and


for now continue 3 times a day IV Lasix, lower dose metolazone, and low-dose 


beta-blocker


   -Continue to keep the head of the bed greater than 30 degrees


   - follow up Cardiology recommendations and continue digoxin for now.


   -Off of Entresto presently while in-house


2. Acute on chronic kidney disease.  Again, the kidney function appears to be 


slightly worse than when he was here 2 months ago.  Creatinine slightly 


increased from yesterday.  Patient having adequate urine output.


   -Follow-up recommendations from renal consult


   -Continue to carefully monitor BUN and creatinine levels and urine output. 


3.  Smoking history.  


   -Counseled on cessation. 


   -Continue nicotine patch.


4.  History of hypertension.  Again, see above.  


   - Continue current medications.  


   - Also hydralazine p.r.n.


5.  History of atrial fibrillation -appears stable


   -Monitor, continue Eliquis as well.


6.  Type 2 diabetes.  A1c equals 9.0, sugars presently stable 


   - continue Lantus and sliding scale insulin.


7.  Deep venous thrombosis prophylaxis-on Eliquis.


Result Diagram:  


2/14/19 0553                                                                    


           2/14/19 0553





Results 24hrs





Laboratory Tests


Test
                 2/13/19
13:05  2/13/19
17:32  2/13/19
22:06  2/14/19
02:40


Bedside Glucose               197            199            195            129


Test
                 2/14/19
05:53  2/14/19
08:30  2/14/19
08:55  2/14/19
09:17


White Blood Count             6.1


Red Blood Count             4.40  L


Hemoglobin                  10.9  L


Hematocrit                  36.7  L


Mean Corpuscular             83.4


Volume


Mean Corpuscular            24.8  L


Hemoglobin


Mean Corpuscular           29.7  L
  
              
              



Hemoglobin
Concent


Red Cell                    18.5  H


Distribution Width


Platelet Count                178


Mean Platelet Volume        12.3  H


Immature                    0.300


Granulocytes %


Neutrophils %                70.0


Lymphocytes %                17.0


Monocytes %                   9.0


Eosinophils %                 2.9


Basophils %                   0.8


Nucleated Red Blood           0.0


Cells %


Immature                    0.020


Granulocytes #


Neutrophils #                 4.3


Lymphocytes #                 1.0


Monocytes #                   0.6


Eosinophils #                 0.2


Basophils #                   0.1


Nucleated Red Blood           0.0


Cells #


Sodium Level                  142


Potassium Level               4.1


Chloride Level                 97


Carbon Dioxide Level          34  H


Anion Gap                      11


Blood Urea Nitrogen           56  H


Creatinine                  2.51  H


Est Glomerular               26  L
  
              
              



Filtrat Rate
mL/min


Glucose Level                68  #L


Calcium Level                 9.0


Bedside Glucose                              55  L           84             84


Test
                 2/14/19
12:01  
              
              



Bedside Glucose               126








Exam/Review of Systems


Exam


Vitals





Vital Signs


  Date      Temp  Pulse  Resp  B/P (MAP)   Pulse Ox  O2          O2 Flow    FiO2


Time                                                 Delivery    Rate


   2/14/19  98.0     56    22      112/64        96  Nasal             5.0


     11:54                           (80)            Cannula


   2/14/19                                                                    50


     04:30








Intake and Output





2/13/19 2/13/19 2/14/19





1414:59


22:59


06:59





IntakeIntake Total


750 ml


250 ml





OutputOutput Total


900 ml


720 ml





BalanceBalance


-150 ml


-470 ml














Results


Results 24hrs





Laboratory Tests


Test
                 2/13/19
13:05  2/13/19
17:32  2/13/19
22:06  2/14/19
02:40


Bedside Glucose               197            199            195            129


Test
                 2/14/19
05:53  2/14/19
08:30  2/14/19
08:55  2/14/19
09:17


White Blood Count             6.1


Red Blood Count             4.40  L


Hemoglobin                  10.9  L


Hematocrit                  36.7  L


Mean Corpuscular             83.4


Volume


Mean Corpuscular            24.8  L


Hemoglobin


Mean Corpuscular           29.7  L
  
              
              



Hemoglobin
Concent


Red Cell                    18.5  H


Distribution Width


Platelet Count                178


Mean Platelet Volume        12.3  H


Immature                    0.300


Granulocytes %


Neutrophils %                70.0


Lymphocytes %                17.0


Monocytes %                   9.0


Eosinophils %                 2.9


Basophils %                   0.8


Nucleated Red Blood           0.0


Cells %


Immature                    0.020


Granulocytes #


Neutrophils #                 4.3


Lymphocytes #                 1.0


Monocytes #                   0.6


Eosinophils #                 0.2


Basophils #                   0.1


Nucleated Red Blood           0.0


Cells #


Sodium Level                  142


Potassium Level               4.1


Chloride Level                 97


Carbon Dioxide Level          34  H


Anion Gap                      11


Blood Urea Nitrogen           56  H


Creatinine                  2.51  H


Est Glomerular               26  L
  
              
              



Filtrat Rate
mL/min


Glucose Level                68  #L


Calcium Level                 9.0


Bedside Glucose                              55  L           84             84


Test
                 2/14/19
12:01  
              
              



Bedside Glucose               126








Medications


Medication





Current Medications


IV Flush (NS 3 ml) 3 ml PER PROTOCOL IV ;  Start 2/11/19 at 14:00


Ondansetron HCl (Zofran Inj) 4 mg Q6H  PRN IV NAUSEA/VOMITING Last administered 


on 2/13/19at 04:47; Admin Dose 4 MG;  Start 2/11/19 at 14:00


Acetaminophen (Tylenol Tab) 650 mg Q6H  PRN PO .PAIN 1-3 OR TEMP Last 


administered on 2/13/19at 13:07; Admin Dose 650 MG;  Start 2/11/19 at 14:00


Acetaminophen/ Hydrocodone Bitart (Norco (5/325)) 1 tab Q6H  PRN PO .MOD PAIN 4-


6 Last administered on 2/12/19at 13:01; Admin Dose 1 TAB;  Start 2/11/19 at 


14:00


Morphine Sulfate (morphine) 6 mg Q4H  PRN PO .SEVERE PAIN 7-10;  Start 2/11/19 


at 14:00


Docusate Sodium (Colace) 100 mg Q12H  PRN PO .CONSTIPATION;  Start 2/11/19 at 


14:00


Magnesium Hydroxide (Milk Of Mag) 30 ml DAILY  PRN PO .CONSTIPATION Last 


administered on 2/12/19at 18:09; Admin Dose 30 ML;  Start 2/11/19 at 14:00


Lorazepam (Ativan) 0.5 mg Q6H  PRN PO ANXIETY;  Start 2/11/19 at 14:00


Albuterol/ Ipratropium (Duoneb) 3 ml Q4H RESP THERAPY  PRN HHN SHORTNESS OF 


BREATH Last administered on 2/13/19at 05:39; Admin Dose 3 ML;  Start 2/11/19 at 


14:00


Hydralazine HCl (Apresoline) 10 mg Q6H  PRN IV ELEVATED BLOOD PRESSURE;  Start 


2/11/19 at 14:00


Nitroglycerin (Nitroglycerin (Sl Tab) 0.4 Mg) 1 tab Q5M  PRN SL ANGINA;  Start 


2/11/19 at 14:00


Apixaban (Eliquis) 5 mg BID PO  Last administered on 2/14/19at 08:37; Admin Dose


5 MG;  Start 2/11/19 at 21:00


Digoxin (Digoxin) 0.125 mg DAILY@1300 PO  Last administered on 2/13/19at 13:21; 


Admin Dose 0.125 MG;  Start 2/12/19 at 13:00


Doxazosin Mesylate (Cardura) 4 mg BID PO  Last administered on 2/13/19at 08:42; 


Admin Dose 4 MG;  Start 2/11/19 at 21:00


Ergocalciferol (Drisdol) 50,000 unit Q7D PO  Last administered on 2/11/19at 


18:03; Admin Dose 50,000 UNIT;  Start 2/11/19 at 14:00


Ferrous Sulfate (Ferrous Sulfate (Ec)) 325 mg BID PO  Last administered on 2 /14/19at 08:38; Admin Dose 325 MG;  Start 2/11/19 at 21:00


Insulin Glargine (Lantus) 12 units QHS SC  Last administered on 2/13/19at 21:00;


Admin Dose 12 UNITS;  Start 2/11/19 at 21:00


Famotidine (Pepcid) 20 mg DAILY PO  Last administered on 2/14/19at 08:37; Admin 


Dose 20 MG;  Start 2/12/19 at 09:00


Atorvastatin Calcium (Lipitor) 80 mg HS PO  Last administered on 2/13/19at 


22:07; Admin Dose 80 MG;  Start 2/11/19 at 21:00


Diagnostic Test (Pha) (Accu-Chek) 1 ea 02 XX ;  Start 2/12/19 at 02:00


Miscellaneous Information 1 ea NOTE XX ;  Start 2/11/19 at 14:30


Glucose (Glutose) 15 gm Q15M  PRN PO DECREASED GLUCOSE;  Start 2/11/19 at 14:30


Glucose (Glutose) 22.5 gm Q15M  PRN PO DECREASED GLUCOSE;  Start 2/11/19 at 


14:30


Dextrose (D50w Syringe) 25 ml Q15M  PRN IV DECREASED GLUCOSE;  Start 2/11/19 at 


14:30


Dextrose (D50w Syringe) 50 ml Q15M  PRN IV DECREASED GLUCOSE;  Start 2/11/19 at 


14:30


Glucagon (Glucagen) 1 mg Q15M  PRN IM DECREASED GLUCOSE;  Start 2/11/19 at 14:30


Glucose (Glutose) 15 gm Q15M  PRN BUCCAL DECREASED GLUCOSE Last administered on 


2/14/19at 08:36; Admin Dose 15 GM;  Start 2/11/19 at 14:30


Nicotine (Nicoderm 21 Mg/ 24hr) 1 patch DAILY TRANSDERM ;  Start 2/11/19 at 


16:30


Metoprolol Succinate (Toprol Xl) 25 mg DAILY PO  Last administered on 2/13/19at 


08:43; Admin Dose 25 MG;  Start 2/12/19 at 09:00


Patient Own Medication 1 ea BID PO  Last administered on 2/12/19at 08:09; Admin 


Dose 1 EA;  Start 2/11/19 at 21:00;  Status Hold


Famotidine (Pepcid) 20 mg Q24H PO  Last administered on 2/13/19at 22:08; Admin 


Dose 20 MG;  Start 2/11/19 at 22:00


Insulin Aspart (Novolog Insulin Pen) (Adult SC Insulin - Mild Algorithm)... AC 


MEALS AND  BEDTIME SC  Last administered on 2/13/19at 22:22; Admin Dose 1 UNIT; 


Start 2/12/19 at 17:25


Miscellaneous Information Patients own medicat... BID@10,16 XX ;  Start 2/13/19 


at 10:00


Furosemide (Lasix) 40 mg 0900,1300,1700 IV  Last administered on 2/14/19at 


08:39; Admin Dose 40 MG;  Start 2/13/19 at 17:00


Metolazone (Zaroxolyn) 2.5 mg BID PO  Last administered on 2/14/19at 09:00; 


Admin Dose 2.5 MG;  Start 2/13/19 at 17:15











GORDO MCDERMOTT              Feb 14, 2019 12:22

## 2019-02-14 NOTE — CONS
Assessment/Plan


Assessment/Plan


Assessment/Plan (Daily)


A 68-year-old male presenting with:


1.  Shortness of breath with bilateral lower extremity edema, orthopnea and PND 


likely secondary to acute on chronic congestive heart failure.


2.  Acute on chronic renal failure; however, the patient's baseline creatinine 


ranges from 1.9 to 2.  On last discharge in December, the creatinine was 1.9.  


This could be all secondary to cardiorenal as the patient is clearly in 


decompensated heart failure right now., pt had Renal U/S in 12/18 which showed 


chronci kidney disease, net negative only 620 however creatinine up trended 


today, and had episode of hypotension yesterday


3.  History of atrial fibrillation.


4.  Hypertension.


5.  Diabetes.


6.  Hyperlipidemia.


7.  BPH.


8.  Positive troponin with chest pain ? NSTEMI


9.  Elevated BNP.


 


PLAN:


- neg 620 ml


-cw with Lasix of 40 IV 3 times daily/metolazone


- cr 2.5 today> to monitor


- cw  hold off Entresto at this point.


-  Digoxin levels normal.


-. Keep a systolic blood pressure of greater than 100 to have adequate renal 


perfusion


-   Renally dose all meds.


-   Avoid nephrotoxic agents.





Spoke to the family at bedside





Consultation Date/Type/Reason


Admit Date/Time


Feb 11, 2019 at 13:13


Initial Consult Date





Requesting Provider:  GORDO MCDERMOTT


Date/Time of Note


DATE: 2/14/19 


TIME: 17:17





24 HR Interval Summary


Free Text/Dictation


-[id number]


Bilateral lower extremity edema is improved





Exam/Review of Systems


Exam


Vitals





Vital Signs


  Date      Temp  Pulse  Resp  B/P (MAP)   Pulse Ox  O2          O2 Flow    FiO2


Time                                                 Delivery    Rate


   2/14/19  98.0     56    22  99/58 (72)        92  Nasal             5.0


     16:07                                           Cannula


   2/14/19                                                                    50


     13:23








Intake and Output





2/13/19 2/13/19 2/14/19





1515:00


23:00


07:00





IntakeIntake Total


750 ml


250 ml





OutputOutput Total


900 ml


720 ml





BalanceBalance


-150 ml


-470 ml











Exam


ENERAL:  The patient is awake, alert, oriented and appears to be in mild 


distress secondary to shortness of breath.


HEENT:  Pupils equal, round and reactive to light.  Extraocular movements are 


intact.


NECK:  Supple.  JVD appreciated.


LUNGS:  crackles at bases improved


HEART:  Irregularly irregular.  No murmur, rub or gallop.


ABDOMEN:  Soft, somewhat distended with some abdominal wall edema.


EXTREMITIES:  Legs with 2 to 3+ pitting edema in bilateral lower extremity to 


mid-calf.


GENERAL:  Awake, alert, oriented and does not appear to in any acute distress.


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





Results


Result Diagram:  


2/14/19 0553                                                                    


           2/14/19 0553





Results 24hrs





Laboratory Tests


Test
                 2/13/19
17:32  2/13/19
22:06  2/14/19
02:40  2/14/19
05:53


Bedside Glucose               199            195            129


White Blood Count                                                          6.1


Red Blood Count                                                          4.40  L


Hemoglobin                                                               10.9  L


Hematocrit                                                               36.7  L


Mean Corpuscular                                                          83.4


Volume


Mean Corpuscular                                                         24.8  L


Hemoglobin


Mean Corpuscular      
              
              
                   29.7  L



Hemoglobin
Concent


Red Cell                                                                 18.5  H


Distribution Width


Platelet Count                                                             178


Mean Platelet Volume                                                     12.3  H


Immature                                                                 0.300


Granulocytes %


Neutrophils %                                                             70.0


Lymphocytes %                                                             17.0


Monocytes %                                                                9.0


Eosinophils %                                                              2.9


Basophils %                                                                0.8


Nucleated Red Blood                                                        0.0


Cells %


Immature                                                                 0.020


Granulocytes #


Neutrophils #                                                              4.3


Lymphocytes #                                                              1.0


Monocytes #                                                                0.6


Eosinophils #                                                              0.2


Basophils #                                                                0.1


Nucleated Red Blood                                                        0.0


Cells #


Sodium Level                                                               142


Potassium Level                                                            4.1


Chloride Level                                                              97


Carbon Dioxide Level                                                       34  H


Anion Gap                                                                   11


Blood Urea Nitrogen                                                        56  H


Creatinine                                                               2.51  H


Est Glomerular        
              
              
                     26  L



Filtrat Rate
mL/min


Glucose Level                                                             68  #L


Calcium Level                                                              9.0


Test
                 2/14/19
08:30  2/14/19
08:55  2/14/19
09:17  2/14/19
12:01


Bedside Glucose               55  L           84             84            126








Medications


Medication





Current Medications


IV Flush (NS 3 ml) 3 ml PER PROTOCOL IV ;  Start 2/11/19 at 14:00


Ondansetron HCl (Zofran Inj) 4 mg Q6H  PRN IV NAUSEA/VOMITING Last administered 


on 2/13/19at 04:47; Admin Dose 4 MG;  Start 2/11/19 at 14:00


Acetaminophen (Tylenol Tab) 650 mg Q6H  PRN PO .PAIN 1-3 OR TEMP Last 


administered on 2/13/19at 13:07; Admin Dose 650 MG;  Start 2/11/19 at 14:00


Acetaminophen/ Hydrocodone Bitart (Norco (5/325)) 1 tab Q6H  PRN PO .MOD PAIN 4-


6 Last administered on 2/12/19at 13:01; Admin Dose 1 TAB;  Start 2/11/19 at 


14:00


Morphine Sulfate (morphine) 6 mg Q4H  PRN PO .SEVERE PAIN 7-10;  Start 2/11/19 


at 14:00


Docusate Sodium (Colace) 100 mg Q12H  PRN PO .CONSTIPATION;  Start 2/11/19 at 


14:00


Magnesium Hydroxide (Milk Of Mag) 30 ml DAILY  PRN PO .CONSTIPATION Last 


administered on 2/12/19at 18:09; Admin Dose 30 ML;  Start 2/11/19 at 14:00


Lorazepam (Ativan) 0.5 mg Q6H  PRN PO ANXIETY;  Start 2/11/19 at 14:00


Albuterol/ Ipratropium (Duoneb) 3 ml Q4H RESP THERAPY  PRN HHN SHORTNESS OF 


BREATH Last administered on 2/13/19at 05:39; Admin Dose 3 ML;  Start 2/11/19 at 


14:00


Hydralazine HCl (Apresoline) 10 mg Q6H  PRN IV ELEVATED BLOOD PRESSURE;  Start 


2/11/19 at 14:00


Nitroglycerin (Nitroglycerin (Sl Tab) 0.4 Mg) 1 tab Q5M  PRN SL ANGINA;  Start 


2/11/19 at 14:00


Apixaban (Eliquis) 5 mg BID PO  Last administered on 2/14/19at 08:37; Admin Dose


5 MG;  Start 2/11/19 at 21:00


Digoxin (Digoxin) 0.125 mg DAILY@1300 PO  Last administered on 2/14/19at 13:00; 


Admin Dose 0.125 MG;  Start 2/12/19 at 13:00


Ergocalciferol (Drisdol) 50,000 unit Q7D PO  Last administered on 2/11/19at 


18:03; Admin Dose 50,000 UNIT;  Start 2/11/19 at 14:00


Ferrous Sulfate (Ferrous Sulfate (Ec)) 325 mg BID PO  Last administered on 


2/14/19at 08:38; Admin Dose 325 MG;  Start 2/11/19 at 21:00


Insulin Glargine (Lantus) 12 units QHS SC  Last administered on 2/13/19at 21:00;


Admin Dose 12 UNITS;  Start 2/11/19 at 21:00


Famotidine (Pepcid) 20 mg DAILY PO  Last administered on 2/14/19at 08:37; Admin 


Dose 20 MG;  Start 2/12/19 at 09:00


Atorvastatin Calcium (Lipitor) 80 mg HS PO  Last administered on 2/13/19at 


22:07; Admin Dose 80 MG;  Start 2/11/19 at 21:00


Diagnostic Test (Pha) (Accu-Chek) 1 ea 02 XX ;  Start 2/12/19 at 02:00


Miscellaneous Information 1 ea NOTE XX ;  Start 2/11/19 at 14:30


Glucose (Glutose) 15 gm Q15M  PRN PO DECREASED GLUCOSE;  Start 2/11/19 at 14:30


Glucose (Glutose) 22.5 gm Q15M  PRN PO DECREASED GLUCOSE;  Start 2/11/19 at 


14:30


Dextrose (D50w Syringe) 25 ml Q15M  PRN IV DECREASED GLUCOSE;  Start 2/11/19 at 


14:30


Dextrose (D50w Syringe) 50 ml Q15M  PRN IV DECREASED GLUCOSE;  Start 2/11/19 at 


14:30


Glucagon (Glucagen) 1 mg Q15M  PRN IM DECREASED GLUCOSE;  Start 2/11/19 at 14:30


Glucose (Glutose) 15 gm Q15M  PRN BUCCAL DECREASED GLUCOSE Last administered on 


2/14/19at 08:36; Admin Dose 15 GM;  Start 2/11/19 at 14:30


Nicotine (Nicoderm 21 Mg/ 24hr) 1 patch DAILY TRANSDERM ;  Start 2/11/19 at 


16:30


Metoprolol Succinate (Toprol Xl) 25 mg DAILY PO  Last administered on 2/13/19at 


08:43; Admin Dose 25 MG;  Start 2/12/19 at 09:00


Patient Own Medication 1 ea BID PO  Last administered on 2/12/19at 08:09; Admin 


Dose 1 EA;  Start 2/11/19 at 21:00;  Status Hold


Famotidine (Pepcid) 20 mg Q24H PO  Last administered on 2/13/19at 22:08; Admin 


Dose 20 MG;  Start 2/11/19 at 22:00


Insulin Aspart (Novolog Insulin Pen) (Adult SC Insulin - Mild Algorithm)... AC 


MEALS AND  BEDTIME SC  Last administered on 2/13/19at 22:22; Admin Dose 1 UNIT; 


Start 2/12/19 at 17:25


Miscellaneous Information Patients own medicat... BID@10,16 XX ;  Start 2/13/19 


at 10:00


Furosemide (Lasix) 40 mg 0900,1300,1700 IV  Last administered on 2/14/19at 


14:00; Admin Dose 40 MG;  Start 2/13/19 at 17:00


Metolazone (Zaroxolyn) 2.5 mg BID PO  Last administered on 2/14/19at 09:00; 


Admin Dose 2.5 MG;  Start 2/13/19 at 17:15


Doxazosin Mesylate (Cardura) 4 mg HS PO ;  Start 2/14/19 at 21:00











JOSE YANG MD              Feb 14, 2019 17:20

## 2019-02-14 NOTE — CONS
Assessment/Plan


Assessment/Plan


Hospital Course (Demo Recall)


IMP:


1.CHF- systolic acute on chronic


2.Cardiomyoopathy- EF 30%


3.CKD


4.HTN


5.Dyslipidemia





Recc:


-Tele


-Continue lasix dose and metolazone and follow volume status and creatnine 


clsoely


-continue bb and decrease cardura and as BP allows will add afterloadr eduction 


with low dose hydralazine


-Continue statin/Eliquis





Consultation Date/Type/Reason


Admit Date/Time


Feb 11, 2019 at 13:13


Initial Consult Date


2/11/19


Type of Consult


Cardiology


Reason for Consultation


CHF


Requesting Provider:  GORDO MCDERMOTT


Date/Time of Note


DATE: 2/14/19 


TIME: 12:20





Exam/Review of Systems


Vital Signs


Vitals





Vital Signs


  Date      Temp  Pulse  Resp  B/P (MAP)   Pulse Ox  O2          O2 Flow    FiO2


Time                                                 Delivery    Rate


   2/14/19  98.0     56    22      112/64        96  Nasal             5.0


     11:54                           (80)            Cannula


   2/14/19                                                                    50


     04:30








Intake and Output





2/13/19 2/13/19 2/14/19





1515:00


23:00


07:00





IntakeIntake Total


750 ml


250 ml





OutputOutput Total


900 ml


720 ml





BalanceBalance


-150 ml


-470 ml














Exam


Exam


Review of Systems:


CONSTITUTIONAL:  No fevers, chills.


PULMONARY: mild sob


CARDIOVASCULAR: No chest pain/palpitations


GASTROINTESTINAL:  No nausea/vomiting.


GENITOURINARY:  No hematuria/dysuria.


MUSCULOSKELETAL:  No myagias/arthalgias.


PSYCHIATRIC:  The patient denies depression.


NEUROLOGIC:   No weakness


Constitutional:  alert


Psych:  no complaints


Head:  normocephalic


ENMT:  mucosa pink and moist


Neck:  supple, jvd (9 cm water)


Respiratory:  diminished breath sounds (at bases/B)


Gastrointestinal:  soft, non-tender


Musculoskeletal:  muscle weakness (generalized)


Extremities:  edema (trace/B)





Labs


Result Diagram:  


2/14/19 0553                                                                    


           2/14/19 0553





Results 24hrs





Laboratory Tests


Test
                 2/13/19
13:05  2/13/19
17:32  2/13/19
22:06  2/14/19
02:40


Bedside Glucose               197            199            195            129


Test
                 2/14/19
05:53  2/14/19
08:30  2/14/19
08:55  2/14/19
09:17


White Blood Count             6.1


Red Blood Count             4.40  L


Hemoglobin                  10.9  L


Hematocrit                  36.7  L


Mean Corpuscular             83.4


Volume


Mean Corpuscular            24.8  L


Hemoglobin


Mean Corpuscular           29.7  L
  
              
              



Hemoglobin
Concent


Red Cell                    18.5  H


Distribution Width


Platelet Count                178


Mean Platelet Volume        12.3  H


Immature                    0.300


Granulocytes %


Neutrophils %                70.0


Lymphocytes %                17.0


Monocytes %                   9.0


Eosinophils %                 2.9


Basophils %                   0.8


Nucleated Red Blood           0.0


Cells %


Immature                    0.020


Granulocytes #


Neutrophils #                 4.3


Lymphocytes #                 1.0


Monocytes #                   0.6


Eosinophils #                 0.2


Basophils #                   0.1


Nucleated Red Blood           0.0


Cells #


Sodium Level                  142


Potassium Level               4.1


Chloride Level                 97


Carbon Dioxide Level          34  H


Anion Gap                      11


Blood Urea Nitrogen           56  H


Creatinine                  2.51  H


Est Glomerular               26  L
  
              
              



Filtrat Rate
mL/min


Glucose Level                68  #L


Calcium Level                 9.0


Bedside Glucose                              55  L           84             84


Test
                 2/14/19
12:01  
              
              



Bedside Glucose               126








Medications


Medications





Current Medications


IV Flush (NS 3 ml) 3 ml PER PROTOCOL IV ;  Start 2/11/19 at 14:00


Ondansetron HCl (Zofran Inj) 4 mg Q6H  PRN IV NAUSEA/VOMITING Last administered 


on 2/13/19at 04:47; Admin Dose 4 MG;  Start 2/11/19 at 14:00


Acetaminophen (Tylenol Tab) 650 mg Q6H  PRN PO .PAIN 1-3 OR TEMP Last 


administered on 2/13/19at 13:07; Admin Dose 650 MG;  Start 2/11/19 at 14:00


Acetaminophen/ Hydrocodone Bitart (Norco (5/325)) 1 tab Q6H  PRN PO .MOD PAIN 4-


6 Last administered on 2/12/19at 13:01; Admin Dose 1 TAB;  Start 2/11/19 at 


14:00


Morphine Sulfate (morphine) 6 mg Q4H  PRN PO .SEVERE PAIN 7-10;  Start 2/11/19 


at 14:00


Docusate Sodium (Colace) 100 mg Q12H  PRN PO .CONSTIPATION;  Start 2/11/19 at 


14:00


Magnesium Hydroxide (Milk Of Mag) 30 ml DAILY  PRN PO .CONSTIPATION Last 


administered on 2/12/19at 18:09; Admin Dose 30 ML;  Start 2/11/19 at 14:00


Lorazepam (Ativan) 0.5 mg Q6H  PRN PO ANXIETY;  Start 2/11/19 at 14:00


Albuterol/ Ipratropium (Duoneb) 3 ml Q4H RESP THERAPY  PRN HHN SHORTNESS OF 


BREATH Last administered on 2/13/19at 05:39; Admin Dose 3 ML;  Start 2/11/19 at 


14:00


Hydralazine HCl (Apresoline) 10 mg Q6H  PRN IV ELEVATED BLOOD PRESSURE;  Start 


2/11/19 at 14:00


Nitroglycerin (Nitroglycerin (Sl Tab) 0.4 Mg) 1 tab Q5M  PRN SL ANGINA;  Start 


2/11/19 at 14:00


Apixaban (Eliquis) 5 mg BID PO  Last administered on 2/14/19at 08:37; Admin Dose


5 MG;  Start 2/11/19 at 21:00


Digoxin (Digoxin) 0.125 mg DAILY@1300 PO  Last administered on 2/13/19at 13:21; 


Admin Dose 0.125 MG;  Start 2/12/19 at 13:00


Doxazosin Mesylate (Cardura) 4 mg BID PO  Last administered on 2/13/19at 08:42; 


Admin Dose 4 MG;  Start 2/11/19 at 21:00


Ergocalciferol (Drisdol) 50,000 unit Q7D PO  Last administered on 2/11/19at 


18:03; Admin Dose 50,000 UNIT;  Start 2/11/19 at 14:00


Ferrous Sulfate (Ferrous Sulfate (Ec)) 325 mg BID PO  Last administered on 


2/14/19at 08:38; Admin Dose 325 MG;  Start 2/11/19 at 21:00


Insulin Glargine (Lantus) 12 units QHS SC  Last administered on 2/13/19at 21:00;


Admin Dose 12 UNITS;  Start 2/11/19 at 21:00


Famotidine (Pepcid) 20 mg DAILY PO  Last administered on 2/14/19at 08:37; Admin 


Dose 20 MG;  Start 2/12/19 at 09:00


Atorvastatin Calcium (Lipitor) 80 mg HS PO  Last administered on 2/13/19at 


22:07; Admin Dose 80 MG;  Start 2/11/19 at 21:00


Diagnostic Test (Pha) (Accu-Chek) 1 ea 02 XX ;  Start 2/12/19 at 02:00


Miscellaneous Information 1 ea NOTE XX ;  Start 2/11/19 at 14:30


Glucose (Glutose) 15 gm Q15M  PRN PO DECREASED GLUCOSE;  Start 2/11/19 at 14:30


Glucose (Glutose) 22.5 gm Q15M  PRN PO DECREASED GLUCOSE;  Start 2/11/19 at 


14:30


Dextrose (D50w Syringe) 25 ml Q15M  PRN IV DECREASED GLUCOSE;  Start 2/11/19 at 


14:30


Dextrose (D50w Syringe) 50 ml Q15M  PRN IV DECREASED GLUCOSE;  Start 2/11/19 at 


14:30


Glucagon (Glucagen) 1 mg Q15M  PRN IM DECREASED GLUCOSE;  Start 2/11/19 at 14:30


Glucose (Glutose) 15 gm Q15M  PRN BUCCAL DECREASED GLUCOSE Last administered on 


2/14/19at 08:36; Admin Dose 15 GM;  Start 2/11/19 at 14:30


Nicotine (Nicoderm 21 Mg/ 24hr) 1 patch DAILY TRANSDERM ;  Start 2/11/19 at 


16:30


Metoprolol Succinate (Toprol Xl) 25 mg DAILY PO  Last administered on 2/13/19at 


08:43; Admin Dose 25 MG;  Start 2/12/19 at 09:00


Patient Own Medication 1 ea BID PO  Last administered on 2/12/19at 08:09; Admin 


Dose 1 EA;  Start 2/11/19 at 21:00;  Status Hold


Famotidine (Pepcid) 20 mg Q24H PO  Last administered on 2/13/19at 22:08; Admin 


Dose 20 MG;  Start 2/11/19 at 22:00


Insulin Aspart (Novolog Insulin Pen) (Adult SC Insulin - Mild Algorithm)... AC 


MEALS AND  BEDTIME SC  Last administered on 2/13/19at 22:22; Admin Dose 1 UNIT; 


Start 2/12/19 at 17:25


Miscellaneous Information Patients own medicat... BID@10,16 XX ;  Start 2/13/19 


at 10:00


Furosemide (Lasix) 40 mg 0900,1300,1700 IV  Last administered on 2/14/19at 


08:39; Admin Dose 40 MG;  Start 2/13/19 at 17:00


Metolazone (Zaroxolyn) 2.5 mg BID PO  Last administered on 2/14/19at 09:00; 


Admin Dose 2.5 MG;  Start 2/13/19 at 17:15











ROSANNE WERNER               Feb 14, 2019 12:23

## 2019-02-15 VITALS — HEART RATE: 59 BPM | DIASTOLIC BLOOD PRESSURE: 55 MMHG | RESPIRATION RATE: 18 BRPM | SYSTOLIC BLOOD PRESSURE: 108 MMHG

## 2019-02-15 VITALS — HEART RATE: 73 BPM | DIASTOLIC BLOOD PRESSURE: 58 MMHG | SYSTOLIC BLOOD PRESSURE: 118 MMHG | RESPIRATION RATE: 18 BRPM

## 2019-02-15 VITALS — RESPIRATION RATE: 18 BRPM | DIASTOLIC BLOOD PRESSURE: 59 MMHG | SYSTOLIC BLOOD PRESSURE: 108 MMHG | HEART RATE: 83 BPM

## 2019-02-15 VITALS — DIASTOLIC BLOOD PRESSURE: 52 MMHG | RESPIRATION RATE: 18 BRPM | HEART RATE: 53 BPM | SYSTOLIC BLOOD PRESSURE: 100 MMHG

## 2019-02-15 VITALS — HEART RATE: 55 BPM

## 2019-02-15 VITALS — HEART RATE: 61 BPM | DIASTOLIC BLOOD PRESSURE: 50 MMHG | SYSTOLIC BLOOD PRESSURE: 95 MMHG | RESPIRATION RATE: 18 BRPM

## 2019-02-15 VITALS — HEART RATE: 72 BPM

## 2019-02-15 VITALS — HEART RATE: 69 BPM

## 2019-02-15 VITALS — HEART RATE: 71 BPM | SYSTOLIC BLOOD PRESSURE: 110 MMHG | RESPIRATION RATE: 18 BRPM | DIASTOLIC BLOOD PRESSURE: 59 MMHG

## 2019-02-15 VITALS — HEART RATE: 65 BPM

## 2019-02-15 VITALS — HEART RATE: 63 BPM

## 2019-02-15 LAB
ABNORMAL IP MESSAGE: 1
ADD MAN DIFF?: NO
ANION GAP: 8 (ref 5–13)
BASOPHIL #: 0.1 10^3/UL (ref 0–0.1)
BASOPHILS %: 0.8 % (ref 0–2)
BLOOD UREA NITROGEN: 57 MG/DL (ref 7–20)
CALCIUM: 9 MG/DL (ref 8.4–10.2)
CARBON DIOXIDE: 36 MMOL/L (ref 21–31)
CHLORIDE: 99 MMOL/L (ref 97–110)
CREATININE: 2.33 MG/DL (ref 0.61–1.24)
EOSINOPHILS #: 0.2 10^3/UL (ref 0–0.5)
EOSINOPHILS %: 3 % (ref 0–7)
GLUCOSE: 124 MG/DL (ref 70–220)
HEMATOCRIT: 37 % (ref 42–52)
HEMOGLOBIN: 10.6 G/DL (ref 14–18)
IMMATURE GRANS #M: 0.02 10^3/UL (ref 0–0.03)
IMMATURE GRANS % (M): 0.3 % (ref 0–0.43)
LYMPHOCYTES #: 1.1 10^3/UL (ref 0.8–2.9)
LYMPHOCYTES %: 17.9 % (ref 15–51)
MEAN CORPUSCULAR HEMOGLOBIN: 24 PG (ref 29–33)
MEAN CORPUSCULAR HGB CONC: 28.6 G/DL (ref 32–37)
MEAN CORPUSCULAR VOLUME: 83.9 FL (ref 82–101)
MEAN PLATELET VOLUME: 12.4 FL (ref 7.4–10.4)
MONOCYTE #: 0.7 10^3/UL (ref 0.3–0.9)
MONOCYTES %: 10.8 % (ref 0–11)
NEUTROPHIL #: 4.1 10^3/UL (ref 1.6–7.5)
NEUTROPHILS %: 67.2 % (ref 39–77)
NUCLEATED RED BLOOD CELLS #: 0 10^3/UL (ref 0–0)
NUCLEATED RED BLOOD CELLS%: 0 /100WBC (ref 0–0)
PLATELET COUNT: 173 10^3/UL (ref 140–415)
POSITIVE DIFF: (no result)
POTASSIUM: 4.2 MMOL/L (ref 3.5–5.1)
RED BLOOD COUNT: 4.41 10^6/UL (ref 4.7–6.1)
RED CELL DISTRIBUTION WIDTH: 18.5 % (ref 11.5–14.5)
SODIUM: 143 MMOL/L (ref 135–144)
WHITE BLOOD COUNT: 6.1 10^3/UL (ref 4.8–10.8)

## 2019-02-15 RX ADMIN — FAMOTIDINE 1 MG: 20 TABLET ORAL at 08:18

## 2019-02-15 RX ADMIN — FERROUS SULFATE TAB 325 MG (65 MG ELEMENTAL FE) 1 MG: 325 (65 FE) TAB at 08:17

## 2019-02-15 RX ADMIN — FERROUS SULFATE TAB 325 MG (65 MG ELEMENTAL FE) 1 MG: 325 (65 FE) TAB at 21:00

## 2019-02-15 RX ADMIN — NICOTINE 1 PATCH: 21 PATCH, EXTENDED RELEASE TRANSDERMAL at 09:00

## 2019-02-15 RX ADMIN — FAMOTIDINE SCH MG: 20 TABLET ORAL at 21:11

## 2019-02-15 RX ADMIN — INSULIN ASPART 1 UNIT: 100 INJECTION, SOLUTION INTRAVENOUS; SUBCUTANEOUS at 11:20

## 2019-02-15 RX ADMIN — METOLAZONE 1 MG: 2.5 TABLET ORAL at 22:49

## 2019-02-15 RX ADMIN — FAMOTIDINE 1 MG: 20 TABLET ORAL at 21:11

## 2019-02-15 RX ADMIN — METOPROLOL SUCCINATE 1 MG: 25 TABLET, EXTENDED RELEASE ORAL at 09:00

## 2019-02-15 RX ADMIN — FAMOTIDINE SCH MG: 20 TABLET ORAL at 08:18

## 2019-02-15 RX ADMIN — FERROUS SULFATE TAB 325 MG (65 MG ELEMENTAL FE) SCH MG: 325 (65 FE) TAB at 21:00

## 2019-02-15 RX ADMIN — APIXABAN SCH MG: 5 TABLET, FILM COATED ORAL at 08:18

## 2019-02-15 RX ADMIN — ATORVASTATIN CALCIUM 1 MG: 80 TABLET, FILM COATED ORAL at 21:10

## 2019-02-15 RX ADMIN — DOXAZOSIN 1 MG: 4 TABLET ORAL at 21:14

## 2019-02-15 RX ADMIN — INSULIN GLARGINE SCH UNITS: 100 INJECTION, SOLUTION SUBCUTANEOUS at 21:23

## 2019-02-15 RX ADMIN — DIGOXIN 1 MG: 125 TABLET ORAL at 13:00

## 2019-02-15 RX ADMIN — APIXABAN 1 MG: 5 TABLET, FILM COATED ORAL at 08:18

## 2019-02-15 RX ADMIN — INSULIN ASPART 1 UNIT: 100 INJECTION, SOLUTION INTRAVENOUS; SUBCUTANEOUS at 17:14

## 2019-02-15 RX ADMIN — FERROUS SULFATE TAB 325 MG (65 MG ELEMENTAL FE) SCH MG: 325 (65 FE) TAB at 08:17

## 2019-02-15 RX ADMIN — DIGOXIN SCH MG: 125 TABLET ORAL at 13:00

## 2019-02-15 RX ADMIN — FUROSEMIDE 1 MG: 10 INJECTION, SOLUTION INTRAVENOUS at 13:07

## 2019-02-15 RX ADMIN — METOPROLOL SUCCINATE SCH MG: 25 TABLET, EXTENDED RELEASE ORAL at 09:00

## 2019-02-15 RX ADMIN — ATORVASTATIN CALCIUM SCH MG: 80 TABLET, FILM COATED ORAL at 21:10

## 2019-02-15 RX ADMIN — APIXABAN 1 MG: 5 TABLET, FILM COATED ORAL at 21:10

## 2019-02-15 RX ADMIN — APIXABAN SCH MG: 5 TABLET, FILM COATED ORAL at 21:10

## 2019-02-15 RX ADMIN — INSULIN GLARGINE 1 UNITS: 100 INJECTION, SOLUTION SUBCUTANEOUS at 21:23

## 2019-02-15 RX ADMIN — FUROSEMIDE 1 MG: 10 INJECTION, SOLUTION INTRAVENOUS at 08:18

## 2019-02-15 RX ADMIN — NICOTINE SCH PATCH: 21 PATCH, EXTENDED RELEASE TRANSDERMAL at 09:00

## 2019-02-15 RX ADMIN — INSULIN ASPART 1 UNIT: 100 INJECTION, SOLUTION INTRAVENOUS; SUBCUTANEOUS at 21:31

## 2019-02-15 RX ADMIN — FUROSEMIDE 1 MG: 10 INJECTION, SOLUTION INTRAVENOUS at 17:08

## 2019-02-15 RX ADMIN — METOLAZONE 1 MG: 2.5 TABLET ORAL at 08:17

## 2019-02-15 RX ADMIN — INSULIN ASPART 1 UNIT: 100 INJECTION, SOLUTION INTRAVENOUS; SUBCUTANEOUS at 07:25

## 2019-02-15 RX ADMIN — DOXAZOSIN SCH MG: 4 TABLET ORAL at 21:14

## 2019-02-15 NOTE — CONS
Assessment/Plan


Assessment/Plan


Hospital Course (Demo Recall)


IMP:


1.CHF- systolic acute on chronic


2.Cardiomyopathy- EF 30%


3.CKD


4.HTN


5.Dyslipidemia


6. Hepatomegaly-likley due to passive congestion of liver





Recc:


-Tele


-Continue lasix dose and metolazone and follow volume status and creatnine 


clsoely


-continue bb and decrease cardura and as BP allows will add afterload reduction 


with low dose hydralazine


-Continue statin/Eliquis





Consultation Date/Type/Reason


Admit Date/Time


Feb 11, 2019 at 13:13


Initial Consult Date


2/11/19


Type of Consult


Cardiology


Reason for Consultation


CHF


Requesting Provider:  GORDO MCDERMOTT


Date/Time of Note


DATE: 2/15/19 


TIME: 14:34





Exam/Review of Systems


Vital Signs


Vitals





Vital Signs


  Date      Temp  Pulse  Resp  B/P (MAP)   Pulse Ox  O2          O2 Flow    FiO2


Time                                                 Delivery    Rate


   2/15/19           63


     13:11


   2/15/19  98.4           18      108/55        95


     11:51                           (72)


   2/15/19                                           Nasal             3.0


     07:48                                           Cannula


   2/14/19                                                                    50


     13:23








Intake and Output





2/14/19


2/14/19


2/15/19





1515:00


23:00


07:00





IntakeIntake Total


700 ml


400 ml





OutputOutput Total


1100 ml


1000 ml





BalanceBalance


-400 ml


-600 ml














Exam


Exam


Review of Systems:


CONSTITUTIONAL:  No fevers, chills.


PULMONARY:  No sob


CARDIOVASCULAR: No chest pain/palpitations


GASTROINTESTINAL:  No nausea/vomiting.


GENITOURINARY:  No hematuria/dysuria.


MUSCULOSKELETAL:  No myagias/arthalgias.


PSYCHIATRIC:  The patient denies depression.


NEUROLOGIC:   No weakness


Constitutional:  alert


Psych:  no complaints


Head:  normocephalic


ENMT:  mucosa pink and moist


Neck:  supple, jvd (9 cm water)


Respiratory:  diminished breath sounds (at bases/B)


Cardiovascular:  regular rate and rhythm


Gastrointestinal:  soft, non-tender


Musculoskeletal:  muscle tone (normal)


Extremities:  edema (none)


Neurological:  other (No focal deficits)





Labs


Result Diagram:  


2/15/19 0619                                                                    


           2/15/19 0619





Results 24hrs





Laboratory Tests


Test
                 2/14/19
17:38  2/14/19
20:59  2/15/19
01:36  2/15/19
06:19


Bedside Glucose               168           229  H         233  H


White Blood Count                                                          6.1


Red Blood Count                                                          4.41  L


Hemoglobin                                                               10.6  L


Hematocrit                                                               37.0  L


Mean Corpuscular                                                          83.9


Volume


Mean Corpuscular                                                         24.0  L


Hemoglobin


Mean Corpuscular      
              
              
                   28.6  L



Hemoglobin
Concent


Red Cell                                                                 18.5  H


Distribution Width


Platelet Count                                                             173


Mean Platelet Volume                                                     12.4  H


Immature                                                                 0.300


Granulocytes %


Neutrophils %                                                             67.2


Lymphocytes %                                                             17.9


Monocytes %                                                               10.8


Eosinophils %                                                              3.0


Basophils %                                                                0.8


Nucleated Red Blood                                                        0.0


Cells %


Immature                                                                 0.020


Granulocytes #


Neutrophils #                                                              4.1


Lymphocytes #                                                              1.1


Monocytes #                                                                0.7


Eosinophils #                                                              0.2


Basophils #                                                                0.1


Nucleated Red Blood                                                        0.0


Cells #


Sodium Level                                                               143


Potassium Level                                                            4.2


Chloride Level                                                              99


Carbon Dioxide Level                                                       36  H


Anion Gap                                                                    8


Blood Urea Nitrogen                                                        57  H


Creatinine                                                               2.33  H


Est Glomerular        
              
              
                     28  L



Filtrat Rate
mL/min


Glucose Level                                                             124  #


Calcium Level                                                              9.0


Test
                 2/15/19
07:34  2/15/19
11:59  
              



Bedside Glucose               131            112








Medications


Medications





Current Medications


IV Flush (NS 3 ml) 3 ml PER PROTOCOL IV ;  Start 2/11/19 at 14:00


Ondansetron HCl (Zofran Inj) 4 mg Q6H  PRN IV NAUSEA/VOMITING Last administered 


on 2/13/19at 04:47; Admin Dose 4 MG;  Start 2/11/19 at 14:00


Acetaminophen (Tylenol Tab) 650 mg Q6H  PRN PO .PAIN 1-3 OR TEMP Last 


administered on 2/13/19at 13:07; Admin Dose 650 MG;  Start 2/11/19 at 14:00


Acetaminophen/ Hydrocodone Bitart (Norco (5/325)) 1 tab Q6H  PRN PO .MOD PAIN 4-


6 Last administered on 2/12/19at 13:01; Admin Dose 1 TAB;  Start 2/11/19 at 


14:00


Morphine Sulfate (morphine) 6 mg Q4H  PRN PO .SEVERE PAIN 7-10;  Start 2/11/19 


at 14:00


Docusate Sodium (Colace) 100 mg Q12H  PRN PO .CONSTIPATION;  Start 2/11/19 at 


14:00


Magnesium Hydroxide (Milk Of Mag) 30 ml DAILY  PRN PO .CONSTIPATION Last 


administered on 2/12/19at 18:09; Admin Dose 30 ML;  Start 2/11/19 at 14:00


Lorazepam (Ativan) 0.5 mg Q6H  PRN PO ANXIETY;  Start 2/11/19 at 14:00


Albuterol/ Ipratropium (Duoneb) 3 ml Q4H RESP THERAPY  PRN HHN SHORTNESS OF 


BREATH Last administered on 2/14/19at 23:35; Admin Dose 3 ML;  Start 2/11/19 at 


14:00


Hydralazine HCl (Apresoline) 10 mg Q6H  PRN IV ELEVATED BLOOD PRESSURE;  Start 


2/11/19 at 14:00


Nitroglycerin (Nitroglycerin (Sl Tab) 0.4 Mg) 1 tab Q5M  PRN SL ANGINA;  Start 


2/11/19 at 14:00


Apixaban (Eliquis) 5 mg BID PO  Last administered on 2/15/19at 08:18; Admin Dose


5 MG;  Start 2/11/19 at 21:00


Digoxin (Digoxin) 0.125 mg DAILY@1300 PO  Last administered on 2/14/19at 13:00; 


Admin Dose 0.125 MG;  Start 2/12/19 at 13:00


Ergocalciferol (Drisdol) 50,000 unit Q7D PO  Last administered on 2/11/19at 


18:03; Admin Dose 50,000 UNIT;  Start 2/11/19 at 14:00


Ferrous Sulfate (Ferrous Sulfate (Ec)) 325 mg BID PO  Last administered on 


2/15/19at 08:17; Admin Dose 325 MG;  Start 2/11/19 at 21:00


Insulin Glargine (Lantus) 12 units QHS SC  Last administered on 2/14/19at 21:15;


Admin Dose 12 UNITS;  Start 2/11/19 at 21:00


Famotidine (Pepcid) 20 mg DAILY PO  Last administered on 2/15/19at 08:18; Admin 


Dose 20 MG;  Start 2/12/19 at 09:00


Atorvastatin Calcium (Lipitor) 80 mg HS PO  Last administered on 2/14/19at 


21:02; Admin Dose 80 MG;  Start 2/11/19 at 21:00


Diagnostic Test (Pha) (Accu-Chek) 1 ea 02 XX ;  Start 2/12/19 at 02:00


Miscellaneous Information 1 ea NOTE XX ;  Start 2/11/19 at 14:30


Glucose (Glutose) 15 gm Q15M  PRN PO DECREASED GLUCOSE;  Start 2/11/19 at 14:30


Glucose (Glutose) 22.5 gm Q15M  PRN PO DECREASED GLUCOSE;  Start 2/11/19 at 


14:30


Dextrose (D50w Syringe) 25 ml Q15M  PRN IV DECREASED GLUCOSE;  Start 2/11/19 at 


14:30


Dextrose (D50w Syringe) 50 ml Q15M  PRN IV DECREASED GLUCOSE;  Start 2/11/19 at 


14:30


Glucagon (Glucagen) 1 mg Q15M  PRN IM DECREASED GLUCOSE;  Start 2/11/19 at 14:30


Glucose (Glutose) 15 gm Q15M  PRN BUCCAL DECREASED GLUCOSE Last administered on 


2/14/19at 08:36; Admin Dose 15 GM;  Start 2/11/19 at 14:30


Nicotine (Nicoderm 21 Mg/ 24hr) 1 patch DAILY TRANSDERM ;  Start 2/11/19 at 


16:30


Metoprolol Succinate (Toprol Xl) 25 mg DAILY PO  Last administered on 2/13/19at 


08:43; Admin Dose 25 MG;  Start 2/12/19 at 09:00


Patient Own Medication 1 ea BID PO  Last administered on 2/12/19at 08:09; Admin 


Dose 1 EA;  Start 2/11/19 at 21:00;  Status Hold


Famotidine (Pepcid) 20 mg Q24H PO  Last administered on 2/14/19at 21:02; Admin 


Dose 20 MG;  Start 2/11/19 at 22:00


Insulin Aspart (Novolog Insulin Pen) (Adult SC Insulin - Mild Algorithm)... AC 


MEALS AND  BEDTIME SC  Last administered on 2/14/19at 21:15; Admin Dose 2 UNIT; 


Start 2/12/19 at 17:25


Miscellaneous Information Patients own medicat... BID@10,16 XX  Last 


administered on 2/15/19at 10:13; Admin Dose 1,000 EA;  Start 2/13/19 at 10:00


Furosemide (Lasix) 40 mg 0900,1300,1700 IV  Last administered on 2/15/19at 


13:07; Admin Dose 40 MG;  Start 2/13/19 at 17:00


Metolazone (Zaroxolyn) 2.5 mg BID PO  Last administered on 2/15/19at 08:17; 


Admin Dose 2.5 MG;  Start 2/13/19 at 17:15


Doxazosin Mesylate (Cardura) 4 mg HS PO ;  Start 2/14/19 at 21:00


Magnesium Citrate (Citroma) 300 ml ONCE  ONCE PO ;  Start 2/15/19 at 15:00;  


Stop 2/15/19 at 15:01











ROSANNE WERNER               Feb 15, 2019 14:38

## 2019-02-15 NOTE — CONS
[facility]


                                        


                                        


                                        


                                        


                                Consult Follow-up


                                        


Patient Name: Trevor Lei                           Unit Number: [id number]


YOB: 1950                     Patient Status: Admitted Inpatient


Attending Doctor: Gordo Mcdermott                 Account Number: [id number]





Edit: JOSE YANG MD on 2/15/19 @ 17:30





cr fluctating


cw monitor


__________


___________________________________________________________________________


                                                    


Assessment/Plan


Assessment/Plan


Hospital Course (Demo Recall)


1.  Shortness of breath with bilateral lower extremity edema, orthopnea and PND 


likely secondary to acute on chronic congestive heart failure.


2.  Acute on chronic renal failure; however, the patient's baseline creatinine 


ranges from 1.9 to 2.  On last discharge in December, the creatinine was 1.9.  


This could be all secondary to cardiorenal as the patient is clearly in 


decompensated heart failure right now., pt had Renal U/S in 12/18 which showed 


chronic kidney disease, 


3.  History of atrial fibrillation.


4.  Hypertension.


5.  Diabetes.


6.  Hyperlipidemia.


7.  BPH.


8.  Positive troponin with chest pain ? NSTEMI. Per cardiology CHF EF 35%


9.  Elevated BNP.


 10 Microcytic hypochromic anemia


11. Overweight


12. fatty liver infiltration


13. s/p cholecystectomy


Assessment/Plan (Daily)


-cw with Lasix of 40 IV 3 times daily/metolazone


- cr 2.33 from 2.5 > to monitor


-  Digoxin levels normal.


-Renal US is normal


- Keep a systolic blood pressure of greater than 100 to have adequate renal 


perfusion


-   Renally dose all meds.


-   Avoid nephrotoxic agents.





Consultation Date/Type/Reason


Admit Date/Time


Feb 11, 2019 at 13:13


Initial Consult Date


2/11/2019


Type of Consult


nephrology


Reason for Consultation


dr Germain


Requesting Provider:  GORDO MCDERMOTT


Date/Time of Note


DATE: 2/15/19 


TIME: 13:33





24 HR Interval Summary


Free Text/Dictation


less leg edema


Constitutional:  improved





Exam/Review of Systems


Exam


Vitals





Vital Signs


  Date      Temp  Pulse  Resp  B/P (MAP)   Pulse Ox  O2          O2 Flow    FiO2


Time                                                 Delivery    Rate


   2/15/19           63


     13:11


   2/15/19  98.4           18      108/55        95


     11:51                           (72)


   2/15/19                                           Nasal             3.0


     07:48                                           Cannula


   2/14/19                                                                    50


     13:23








Intake and Output





2/14/19


2/14/19


2/15/19





1515:00


23:00


07:00





IntakeIntake Total


700 ml


400 ml





OutputOutput Total


1100 ml


1000 ml





BalanceBalance


-400 ml


-600 ml











Constitutional:  alert, oriented


Neck:  supple


Respiratory:  diminished breath sounds


Cardiovascular:  regular rate and rhythm, other (afib)


Gastrointestinal:  soft


Musculoskeletal:  other (BLE discoloration)





Results


Result Diagram:  


2/15/19 0619                                                                    


           2/15/19 0619





Results 24hrs





Laboratory Tests


Test
                 2/14/19
17:38  2/14/19
20:59  2/15/19
01:36  2/15/19
06:19


Bedside Glucose               168           229  H         233  H


White Blood Count                                                          6.1


Red Blood Count                                                          4.41  L


Hemoglobin                                                               10.6  L


Hematocrit                                                               37.0  L


Mean Corpuscular                                                          83.9


Volume


Mean Corpuscular                                                         24.0  L


Hemoglobin


Mean Corpuscular      
              
              
                   28.6  L



Hemoglobin
Concent


Red Cell                                                                 18.5  H


Distribution Width


Platelet Count                                                             173


Mean Platelet Volume                                                     12.4  H


Immature                                                                 0.300


Granulocytes %


Neutrophils %                                                             67.2


Lymphocytes %                                                             17.9


Monocytes %                                                               10.8


Eosinophils %                                                              3.0


Basophils %                                                                0.8


Nucleated Red Blood                                                        0.0


Cells %


Immature                                                                 0.020


Granulocytes #


Neutrophils #                                                              4.1


Lymphocytes #                                                              1.1


Monocytes #                                                                0.7


Eosinophils #                                                              0.2


Basophils #                                                                0.1


Nucleated Red Blood                                                        0.0


Cells #


Sodium Level                                                               143


Potassium Level                                                            4.2


Chloride Level                                                              99


Carbon Dioxide Level                                                       36  H


Anion Gap                                                                    8


Blood Urea Nitrogen                                                        57  H


Creatinine                                                               2.33  H


Est Glomerular        
              
              
                     28  L



Filtrat Rate
mL/min


Glucose Level                                                             124  #


Calcium Level                                                              9.0


Test
                 2/15/19
07:34  2/15/19
11:59  
              



Bedside Glucose               131            112








Medications


Medication





Current Medications


IV Flush (NS 3 ml) 3 ml PER PROTOCOL IV ;  Start 2/11/19 at 14:00


Ondansetron HCl (Zofran Inj) 4 mg Q6H  PRN IV NAUSEA/VOMITING Last administered 


on 2/13/19at 04:47; Admin Dose 4 MG;  Start 2/11/19 at 14:00


Acetaminophen (Tylenol Tab) 650 mg Q6H  PRN PO .PAIN 1-3 OR TEMP Last 


administered on 2/13/19at 13:07; Admin Dose 650 MG;  Start 2/11/19 at 14:00


Acetaminophen/ Hydrocodone Bitart (Norco (5/325)) 1 tab Q6H  PRN PO .MOD PAIN 4-


6 Last administered on 2/12/19at 13:01; Admin Dose 1 TAB;  Start 2/11/19 at 


14:00


Morphine Sulfate (morphine) 6 mg Q4H  PRN PO .SEVERE PAIN 7-10;  Start 2/11/19 


at 14:00


Docusate Sodium (Colace) 100 mg Q12H  PRN PO .CONSTIPATION;  Start 2/11/19 at 


14:00


Magnesium Hydroxide (Milk Of Mag) 30 ml DAILY  PRN PO .CONSTIPATION Last 


administered on 2/12/19at 18:09; Admin Dose 30 ML;  Start 2/11/19 at 14:00


Lorazepam (Ativan) 0.5 mg Q6H  PRN PO ANXIETY;  Start 2/11/19 at 14:00


Albuterol/ Ipratropium (Duoneb) 3 ml Q4H RESP THERAPY  PRN HHN SHORTNESS OF 


BREATH Last administered on 2/14/19at 23:35; Admin Dose 3 ML;  Start 2/11/19 at 


14:00


Hydralazine HCl (Apresoline) 10 mg Q6H  PRN IV ELEVATED BLOOD PRESSURE;  Start 


2/11/19 at 14:00


Nitroglycerin (Nitroglycerin (Sl Tab) 0.4 Mg) 1 tab Q5M  PRN SL ANGINA;  Start 


2/11/19 at 14:00


Apixaban (Eliquis) 5 mg BID PO  Last administered on 2/15/19at 08:18; Admin Dose


5 MG;  Start 2/11/19 at 21:00


Digoxin (Digoxin) 0.125 mg DAILY@1300 PO  Last administered on 2/14/19at 13:00; 


Admin Dose 0.125 MG;  Start 2/12/19 at 13:00


Ergocalciferol (Drisdol) 50,000 unit Q7D PO  Last administered on 2/11/19at 


18:03; Admin Dose 50,000 UNIT;  Start 2/11/19 at 14:00


Ferrous Sulfate (Ferrous Sulfate (Ec)) 325 mg BID PO  Last administered on 


2/15/19at 08:17; Admin Dose 325 MG;  Start 2/11/19 at 21:00


Insulin Glargine (Lantus) 12 units QHS SC  Last administered on 2/14/19at 21:15;


Admin Dose 12 UNITS;  Start 2/11/19 at 21:00


Famotidine (Pepcid) 20 mg DAILY PO  Last administered on 2/15/19at 08:18; Admin 


Dose 20 MG;  Start 2/12/19 at 09:00


Atorvastatin Calcium (Lipitor) 80 mg HS PO  Last administered on 2/14/19at 


21:02; Admin Dose 80 MG;  Start 2/11/19 at 21:00


Diagnostic Test (Pha) (Accu-Chek) 1 ea 02 XX ;  Start 2/12/19 at 02:00


Miscellaneous Information 1 ea NOTE XX ;  Start 2/11/19 at 14:30


Glucose (Glutose) 15 gm Q15M  PRN PO DECREASED GLUCOSE;  Start 2/11/19 at 14:30


Glucose (Glutose) 22.5 gm Q15M  PRN PO DECREASED GLUCOSE;  Start 2/11/19 at 


14:30


Dextrose (D50w Syringe) 25 ml Q15M  PRN IV DECREASED GLUCOSE;  Start 2/11/19 at 


14:30


Dextrose (D50w Syringe) 50 ml Q15M  PRN IV DECREASED GLUCOSE;  Start 2/11/19 at 


14:30


Glucagon (Glucagen) 1 mg Q15M  PRN IM DECREASED GLUCOSE;  Start 2/11/19 at 14:30


Glucose (Glutose) 15 gm Q15M  PRN BUCCAL DECREASED GLUCOSE Last administered on 


2/14/19at 08:36; Admin Dose 15 GM;  Start 2/11/19 at 14:30


Nicotine (Nicoderm 21 Mg/ 24hr) 1 patch DAILY TRANSDERM ;  Start 2/11/19 at 


16:30


Metoprolol Succinate (Toprol Xl) 25 mg DAILY PO  Last administered on 2/13/19at 


08:43; Admin Dose 25 MG;  Start 2/12/19 at 09:00


Patient Own Medication 1 ea BID PO  Last administered on 2/12/19at 08:09; Admin 


Dose 1 EA;  Start 2/11/19 at 21:00;  Status Hold


Famotidine (Pepcid) 20 mg Q24H PO  Last administered on 2/14/19at 21:02; Admin 


Dose 20 MG;  Start 2/11/19 at 22:00


Insulin Aspart (Novolog Insulin Pen) (Adult SC Insulin - Mild Algorithm)... AC 


MEALS AND  BEDTIME SC  Last administered on 2/14/19at 21:15; Admin Dose 2 UNIT; 


Start 2/12/19 at 17:25


Miscellaneous Information Patients own medicat... BID@10,16 XX  Last 


administered on 2/15/19at 10:13; Admin Dose 1,000 EA;  Start 2/13/19 at 10:00


Furosemide (Lasix) 40 mg 0900,1300,1700 IV  Last administered on 2/15/19at 


13:07; Admin Dose 40 MG;  Start 2/13/19 at 17:00


Metolazone (Zaroxolyn) 2.5 mg BID PO  Last administered on 2/15/19at 08:17; 


Admin Dose 2.5 MG;  Start 2/13/19 at 17:15


Doxazosin Mesylate (Cardura) 4 mg HS PO ;  Start 2/14/19 at 21:00











CARA VILLAREAL                Feb 15, 2019 13:36

## 2019-02-15 NOTE — PN
Date/Time of Note


Date/Time of Note


DATE: 2/15/19 


TIME: 15:28





Assessment/Plan


VTE Prophylaxis


Risk score (from Ns)>0 risk:  5


SCD applied (from Ns):  No


SCD contraindicated:  other


Pharmacological prophylaxis:  apixaban





Lines/Catheters


IV Catheter Type (from [facility]):  Peripheral IV


Urinary Cath still in place:  No





Assessment/Plan


Hospital Course


S: Patient states less shortness of breath today.  Seen by cardiology team 


earlier today.





O: VS - see below


PHYSICAL EXAMINATION:


GENERAL:  lying in bed, in no acute distress.


HEENT:  Pupils equal, round and reactive to light.  Extraocular muscles are 


intact.


NECK:  Supple, no thyromegaly.


LUNGS:  Distant breath sounds bilaterally, less crackles heard at the bases.


CARDIOVASCULAR:  Irregularly irregular heart rate.  No rubs or gallops.


ABDOMEN:  Soft, nontender and nondistended.  Normal bowel sounds.  No rebound or


guarding.


MUSCULOSKELETAL:  1+ pitting edema in bilateral lower extremities to the mid 


calves.


NEUROLOGIC:  No focal deficits.


 





 


ASSESSMENT AND PLAN: 68-year-old male coming in with chest pressure, shortness 


of breath, lower extremity swelling, signs of congestive heart failure 


exacerbation, also rule out for acute coronary syndrome.





1.  Shortness of breath and chest pressure -overall slowly improving now.


   -Continue fluid restriction as recommended by cardiology and renal teams, and


for now continue 3 times a day IV Lasix, lower dose metolazone, and low-dose 


beta-blocker


   -Continue to keep the head of the bed greater than 30 degrees


   - follow up Cardiology recommendations and continue digoxin for now.


   -Off of Entresto presently while in-house


2. Acute on chronic kidney disease.  Again, the kidney function appears to be 


slightly worse than when he was here 2 months ago.  Creatinine slightly 


increased from yesterday.  Patient having adequate urine output.


   -Follow-up recommendations from renal consult


   -Continue to carefully monitor BUN and creatinine levels and urine output. 


3.  Smoking history.  


   -Counseled on cessation. 


   -Continue nicotine patch.


4.  History of hypertension.  Again, see above.  


   - Continue current medications.  


   - Also hydralazine p.r.n.


5.  History of atrial fibrillation -appears stable


   -Monitor, continue Eliquis as well.


6.  Type 2 diabetes.  A1c equals 9.0, sugars presently stable 


   - continue Lantus and sliding scale insulin.


7.  Deep venous thrombosis prophylaxis-on Eliquis.


Result Diagram:  


2/15/19 0619                                                                    


           2/15/19 0619





Results 24hrs





Laboratory Tests


Test
                 2/14/19
17:38  2/14/19
20:59  2/15/19
01:36  2/15/19
06:19


Bedside Glucose               168           229  H         233  H


White Blood Count                                                          6.1


Red Blood Count                                                          4.41  L


Hemoglobin                                                               10.6  L


Hematocrit                                                               37.0  L


Mean Corpuscular                                                          83.9


Volume


Mean Corpuscular                                                         24.0  L


Hemoglobin


Mean Corpuscular      
              
              
                   28.6  L



Hemoglobin
Concent


Red Cell                                                                 18.5  H


Distribution Width


Platelet Count                                                             173


Mean Platelet Volume                                                     12.4  H


Immature                                                                 0.300


Granulocytes %


Neutrophils %                                                             67.2


Lymphocytes %                                                             17.9


Monocytes %                                                               10.8


Eosinophils %                                                              3.0


Basophils %                                                                0.8


Nucleated Red Blood                                                        0.0


Cells %


Immature                                                                 0.020


Granulocytes #


Neutrophils #                                                              4.1


Lymphocytes #                                                              1.1


Monocytes #                                                                0.7


Eosinophils #                                                              0.2


Basophils #                                                                0.1


Nucleated Red Blood                                                        0.0


Cells #


Sodium Level                                                               143


Potassium Level                                                            4.2


Chloride Level                                                              99


Carbon Dioxide Level                                                       36  H


Anion Gap                                                                    8


Blood Urea Nitrogen                                                        57  H


Creatinine                                                               2.33  H


Est Glomerular        
              
              
                     28  L



Filtrat Rate
mL/min


Glucose Level                                                             124  #


Calcium Level                                                              9.0


Test
                 2/15/19
07:34  2/15/19
11:59  
              



Bedside Glucose               131            112








Exam/Review of Systems


Exam


Vitals





Vital Signs


  Date      Temp  Pulse  Resp  B/P (MAP)   Pulse Ox  O2          O2 Flow    FiO2


Time                                                 Delivery    Rate


   2/15/19           63


     13:11


   2/15/19  98.4           18      108/55        95


     11:51                           (72)


   2/15/19                                           Nasal             3.0


     07:48                                           Cannula


   2/14/19                                                                    50


     13:23








Intake and Output





2/14/19


2/14/19


2/15/19





1414:59


22:59


06:59





IntakeIntake Total


700 ml


400 ml





OutputOutput Total


1100 ml


1000 ml





BalanceBalance


-400 ml


-600 ml














Results


Results 24hrs





Laboratory Tests


Test
                 2/14/19
17:38  2/14/19
20:59  2/15/19
01:36  2/15/19
06:19


Bedside Glucose               168           229  H         233  H


White Blood Count                                                          6.1


Red Blood Count                                                          4.41  L


Hemoglobin                                                               10.6  L


Hematocrit                                                               37.0  L


Mean Corpuscular                                                          83.9


Volume


Mean Corpuscular                                                         24.0  L


Hemoglobin


Mean Corpuscular      
              
              
                   28.6  L



Hemoglobin
Concent


Red Cell                                                                 18.5  H


Distribution Width


Platelet Count                                                             173


Mean Platelet Volume                                                     12.4  H


Immature                                                                 0.300


Granulocytes %


Neutrophils %                                                             67.2


Lymphocytes %                                                             17.9


Monocytes %                                                               10.8


Eosinophils %                                                              3.0


Basophils %                                                                0.8


Nucleated Red Blood                                                        0.0


Cells %


Immature                                                                 0.020


Granulocytes #


Neutrophils #                                                              4.1


Lymphocytes #                                                              1.1


Monocytes #                                                                0.7


Eosinophils #                                                              0.2


Basophils #                                                                0.1


Nucleated Red Blood                                                        0.0


Cells #


Sodium Level                                                               143


Potassium Level                                                            4.2


Chloride Level                                                              99


Carbon Dioxide Level                                                       36  H


Anion Gap                                                                    8


Blood Urea Nitrogen                                                        57  H


Creatinine                                                               2.33  H


Est Glomerular        
              
              
                     28  L



Filtrat Rate
mL/min


Glucose Level                                                             124  #


Calcium Level                                                              9.0


Test
                 2/15/19
07:34  2/15/19
11:59  
              



Bedside Glucose               131            112








Medications


Medication





Current Medications


IV Flush (NS 3 ml) 3 ml PER PROTOCOL IV ;  Start 2/11/19 at 14:00


Ondansetron HCl (Zofran Inj) 4 mg Q6H  PRN IV NAUSEA/VOMITING Last administered 


on 2/13/19at 04:47; Admin Dose 4 MG;  Start 2/11/19 at 14:00


Acetaminophen (Tylenol Tab) 650 mg Q6H  PRN PO .PAIN 1-3 OR TEMP Last 


administered on 2/13/19at 13:07; Admin Dose 650 MG;  Start 2/11/19 at 14:00


Acetaminophen/ Hydrocodone Bitart (Norco (5/325)) 1 tab Q6H  PRN PO .MOD PAIN 4-


6 Last administered on 2/12/19at 13:01; Admin Dose 1 TAB;  Start 2/11/19 at 


14:00


Morphine Sulfate (morphine) 6 mg Q4H  PRN PO .SEVERE PAIN 7-10;  Start 2/11/19 


at 14:00


Docusate Sodium (Colace) 100 mg Q12H  PRN PO .CONSTIPATION;  Start 2/11/19 at 


14:00


Magnesium Hydroxide (Milk Of Mag) 30 ml DAILY  PRN PO .CONSTIPATION Last 


administered on 2/12/19at 18:09; Admin Dose 30 ML;  Start 2/11/19 at 14:00


Lorazepam (Ativan) 0.5 mg Q6H  PRN PO ANXIETY;  Start 2/11/19 at 14:00


Albuterol/ Ipratropium (Duoneb) 3 ml Q4H RESP THERAPY  PRN HHN SHORTNESS OF 


BREATH Last administered on 2/14/19at 23:35; Admin Dose 3 ML;  Start 2/11/19 at 


14:00


Hydralazine HCl (Apresoline) 10 mg Q6H  PRN IV ELEVATED BLOOD PRESSURE;  Start 


2/11/19 at 14:00


Nitroglycerin (Nitroglycerin (Sl Tab) 0.4 Mg) 1 tab Q5M  PRN SL ANGINA;  Start 2 /11/19 at 14:00


Apixaban (Eliquis) 5 mg BID PO  Last administered on 2/15/19at 08:18; Admin Dose


5 MG;  Start 2/11/19 at 21:00


Digoxin (Digoxin) 0.125 mg DAILY@1300 PO  Last administered on 2/14/19at 13:00; 


Admin Dose 0.125 MG;  Start 2/12/19 at 13:00


Ergocalciferol (Drisdol) 50,000 unit Q7D PO  Last administered on 2/11/19at 


18:03; Admin Dose 50,000 UNIT;  Start 2/11/19 at 14:00


Ferrous Sulfate (Ferrous Sulfate (Ec)) 325 mg BID PO  Last administered on 


2/15/19at 08:17; Admin Dose 325 MG;  Start 2/11/19 at 21:00


Insulin Glargine (Lantus) 12 units QHS SC  Last administered on 2/14/19at 21:15;


Admin Dose 12 UNITS;  Start 2/11/19 at 21:00


Famotidine (Pepcid) 20 mg DAILY PO  Last administered on 2/15/19at 08:18; Admin 


Dose 20 MG;  Start 2/12/19 at 09:00


Atorvastatin Calcium (Lipitor) 80 mg HS PO  Last administered on 2/14/19at 


21:02; Admin Dose 80 MG;  Start 2/11/19 at 21:00


Diagnostic Test (Pha) (Accu-Chek) 1 ea 02 XX ;  Start 2/12/19 at 02:00


Miscellaneous Information 1 ea NOTE XX ;  Start 2/11/19 at 14:30


Glucose (Glutose) 15 gm Q15M  PRN PO DECREASED GLUCOSE;  Start 2/11/19 at 14:30


Glucose (Glutose) 22.5 gm Q15M  PRN PO DECREASED GLUCOSE;  Start 2/11/19 at 


14:30


Dextrose (D50w Syringe) 25 ml Q15M  PRN IV DECREASED GLUCOSE;  Start 2/11/19 at 


14:30


Dextrose (D50w Syringe) 50 ml Q15M  PRN IV DECREASED GLUCOSE;  Start 2/11/19 at 


14:30


Glucagon (Glucagen) 1 mg Q15M  PRN IM DECREASED GLUCOSE;  Start 2/11/19 at 14:30


Glucose (Glutose) 15 gm Q15M  PRN BUCCAL DECREASED GLUCOSE Last administered on 


2/14/19at 08:36; Admin Dose 15 GM;  Start 2/11/19 at 14:30


Nicotine (Nicoderm 21 Mg/ 24hr) 1 patch DAILY TRANSDERM ;  Start 2/11/19 at 


16:30


Metoprolol Succinate (Toprol Xl) 25 mg DAILY PO  Last administered on 2/13/19at 


08:43; Admin Dose 25 MG;  Start 2/12/19 at 09:00


Patient Own Medication 1 ea BID PO  Last administered on 2/12/19at 08:09; Admin 


Dose 1 EA;  Start 2/11/19 at 21:00;  Status Hold


Famotidine (Pepcid) 20 mg Q24H PO  Last administered on 2/14/19at 21:02; Admin 


Dose 20 MG;  Start 2/11/19 at 22:00


Insulin Aspart (Novolog Insulin Pen) (Adult SC Insulin - Mild Algorithm)... AC 


MEALS AND  BEDTIME SC  Last administered on 2/14/19at 21:15; Admin Dose 2 UNIT; 


Start 2/12/19 at 17:25


Miscellaneous Information Patients own medicat... BID@10,16 XX  Last 


administered on 2/15/19at 10:13; Admin Dose 1,000 EA;  Start 2/13/19 at 10:00


Furosemide (Lasix) 40 mg 0900,1300,1700 IV  Last administered on 2/15/19at 


13:07; Admin Dose 40 MG;  Start 2/13/19 at 17:00


Metolazone (Zaroxolyn) 2.5 mg BID PO  Last administered on 2/15/19at 08:17; 


Admin Dose 2.5 MG;  Start 2/13/19 at 17:15


Doxazosin Mesylate (Cardura) 4 mg HS PO ;  Start 2/14/19 at 21:00











GORDO MCDERMOTT              Feb 15, 2019 15:31

## 2019-02-16 VITALS — HEART RATE: 69 BPM

## 2019-02-16 VITALS — RESPIRATION RATE: 16 BRPM | DIASTOLIC BLOOD PRESSURE: 59 MMHG | SYSTOLIC BLOOD PRESSURE: 105 MMHG | HEART RATE: 58 BPM

## 2019-02-16 VITALS — RESPIRATION RATE: 18 BRPM | DIASTOLIC BLOOD PRESSURE: 55 MMHG | HEART RATE: 63 BPM | SYSTOLIC BLOOD PRESSURE: 100 MMHG

## 2019-02-16 VITALS — HEART RATE: 70 BPM

## 2019-02-16 VITALS — SYSTOLIC BLOOD PRESSURE: 132 MMHG | DIASTOLIC BLOOD PRESSURE: 62 MMHG | HEART RATE: 70 BPM | RESPIRATION RATE: 16 BRPM

## 2019-02-16 VITALS — HEART RATE: 71 BPM

## 2019-02-16 VITALS — RESPIRATION RATE: 18 BRPM | DIASTOLIC BLOOD PRESSURE: 55 MMHG | SYSTOLIC BLOOD PRESSURE: 103 MMHG | HEART RATE: 58 BPM

## 2019-02-16 VITALS — HEART RATE: 150 BPM

## 2019-02-16 VITALS — HEART RATE: 60 BPM | RESPIRATION RATE: 19 BRPM | DIASTOLIC BLOOD PRESSURE: 47 MMHG | SYSTOLIC BLOOD PRESSURE: 98 MMHG

## 2019-02-16 VITALS — HEART RATE: 63 BPM

## 2019-02-16 VITALS — HEART RATE: 55 BPM

## 2019-02-16 LAB
ADD MAN DIFF?: NO
ANION GAP: 8 (ref 5–13)
BASOPHIL #: 0.1 10^3/UL (ref 0–0.1)
BASOPHILS %: 0.9 % (ref 0–2)
BLOOD UREA NITROGEN: 62 MG/DL (ref 7–20)
CALCIUM: 9.3 MG/DL (ref 8.4–10.2)
CARBON DIOXIDE: 36 MMOL/L (ref 21–31)
CHLORIDE: 97 MMOL/L (ref 97–110)
CREATININE: 2.41 MG/DL (ref 0.61–1.24)
EOSINOPHILS #: 0.2 10^3/UL (ref 0–0.5)
EOSINOPHILS %: 2.7 % (ref 0–7)
GLUCOSE: 201 MG/DL (ref 70–220)
HEMATOCRIT: 37.5 % (ref 42–52)
HEMOGLOBIN: 10.9 G/DL (ref 14–18)
IMMATURE GRANS #M: 0.02 10^3/UL (ref 0–0.03)
IMMATURE GRANS % (M): 0.3 % (ref 0–0.43)
LYMPHOCYTES #: 1 10^3/UL (ref 0.8–2.9)
LYMPHOCYTES %: 15.5 % (ref 15–51)
MEAN CORPUSCULAR HEMOGLOBIN: 24.2 PG (ref 29–33)
MEAN CORPUSCULAR HGB CONC: 29.1 G/DL (ref 32–37)
MEAN CORPUSCULAR VOLUME: 83.3 FL (ref 82–101)
MEAN PLATELET VOLUME: 12.8 FL (ref 7.4–10.4)
MONOCYTE #: 0.6 10^3/UL (ref 0.3–0.9)
MONOCYTES %: 9 % (ref 0–11)
NEUTROPHIL #: 4.7 10^3/UL (ref 1.6–7.5)
NEUTROPHILS %: 71.6 % (ref 39–77)
NUCLEATED RED BLOOD CELLS #: 0 10^3/UL (ref 0–0)
NUCLEATED RED BLOOD CELLS%: 0 /100WBC (ref 0–0)
PLATELET COUNT: 186 10^3/UL (ref 140–415)
POTASSIUM: 4.7 MMOL/L (ref 3.5–5.1)
RED BLOOD COUNT: 4.5 10^6/UL (ref 4.7–6.1)
RED CELL DISTRIBUTION WIDTH: 18.6 % (ref 11.5–14.5)
SODIUM: 141 MMOL/L (ref 135–144)
WHITE BLOOD COUNT: 6.6 10^3/UL (ref 4.8–10.8)

## 2019-02-16 RX ADMIN — APIXABAN 1 MG: 5 TABLET, FILM COATED ORAL at 20:13

## 2019-02-16 RX ADMIN — FERROUS SULFATE TAB 325 MG (65 MG ELEMENTAL FE) 1 MG: 325 (65 FE) TAB at 20:13

## 2019-02-16 RX ADMIN — FAMOTIDINE SCH MG: 20 TABLET ORAL at 21:23

## 2019-02-16 RX ADMIN — FERROUS SULFATE TAB 325 MG (65 MG ELEMENTAL FE) SCH MG: 325 (65 FE) TAB at 08:27

## 2019-02-16 RX ADMIN — ATORVASTATIN CALCIUM SCH MG: 80 TABLET, FILM COATED ORAL at 20:12

## 2019-02-16 RX ADMIN — INSULIN ASPART 1 UNIT: 100 INJECTION, SOLUTION INTRAVENOUS; SUBCUTANEOUS at 20:26

## 2019-02-16 RX ADMIN — FUROSEMIDE 1 MG: 10 INJECTION, SOLUTION INTRAVENOUS at 12:09

## 2019-02-16 RX ADMIN — DIGOXIN SCH MG: 125 TABLET ORAL at 12:10

## 2019-02-16 RX ADMIN — FAMOTIDINE 1 MG: 20 TABLET ORAL at 08:27

## 2019-02-16 RX ADMIN — FAMOTIDINE SCH MG: 20 TABLET ORAL at 08:27

## 2019-02-16 RX ADMIN — FERROUS SULFATE TAB 325 MG (65 MG ELEMENTAL FE) 1 MG: 325 (65 FE) TAB at 08:27

## 2019-02-16 RX ADMIN — NICOTINE 1 PATCH: 21 PATCH, EXTENDED RELEASE TRANSDERMAL at 08:28

## 2019-02-16 RX ADMIN — APIXABAN 1 MG: 5 TABLET, FILM COATED ORAL at 08:27

## 2019-02-16 RX ADMIN — INSULIN GLARGINE SCH UNITS: 100 INJECTION, SOLUTION SUBCUTANEOUS at 20:26

## 2019-02-16 RX ADMIN — NICOTINE 1 PATCH: 21 PATCH, EXTENDED RELEASE TRANSDERMAL at 09:00

## 2019-02-16 RX ADMIN — METOLAZONE 1 MG: 2.5 TABLET ORAL at 08:27

## 2019-02-16 RX ADMIN — FAMOTIDINE 1 MG: 20 TABLET ORAL at 21:23

## 2019-02-16 RX ADMIN — DIGOXIN 1 MG: 125 TABLET ORAL at 12:10

## 2019-02-16 RX ADMIN — INSULIN ASPART 1 UNIT: 100 INJECTION, SOLUTION INTRAVENOUS; SUBCUTANEOUS at 08:40

## 2019-02-16 RX ADMIN — DOXAZOSIN SCH MG: 4 TABLET ORAL at 20:15

## 2019-02-16 RX ADMIN — METOLAZONE 1 MG: 2.5 TABLET ORAL at 20:14

## 2019-02-16 RX ADMIN — DOXAZOSIN 1 MG: 4 TABLET ORAL at 20:15

## 2019-02-16 RX ADMIN — INSULIN GLARGINE 1 UNITS: 100 INJECTION, SOLUTION SUBCUTANEOUS at 20:26

## 2019-02-16 RX ADMIN — APIXABAN SCH MG: 5 TABLET, FILM COATED ORAL at 08:27

## 2019-02-16 RX ADMIN — APIXABAN SCH MG: 5 TABLET, FILM COATED ORAL at 20:13

## 2019-02-16 RX ADMIN — METOPROLOL SUCCINATE 1 MG: 25 TABLET, EXTENDED RELEASE ORAL at 08:27

## 2019-02-16 RX ADMIN — METOPROLOL SUCCINATE SCH MG: 25 TABLET, EXTENDED RELEASE ORAL at 08:27

## 2019-02-16 RX ADMIN — NICOTINE SCH PATCH: 21 PATCH, EXTENDED RELEASE TRANSDERMAL at 09:00

## 2019-02-16 RX ADMIN — FERROUS SULFATE TAB 325 MG (65 MG ELEMENTAL FE) SCH MG: 325 (65 FE) TAB at 20:13

## 2019-02-16 RX ADMIN — NICOTINE SCH PATCH: 21 PATCH, EXTENDED RELEASE TRANSDERMAL at 08:28

## 2019-02-16 RX ADMIN — FUROSEMIDE 1 MG: 10 INJECTION, SOLUTION INTRAVENOUS at 08:27

## 2019-02-16 RX ADMIN — ATORVASTATIN CALCIUM 1 MG: 80 TABLET, FILM COATED ORAL at 20:12

## 2019-02-16 RX ADMIN — MAGESIUM CITRATE 1 ML: 1.75 LIQUID ORAL at 08:26

## 2019-02-16 RX ADMIN — FUROSEMIDE 1 MG: 10 INJECTION, SOLUTION INTRAVENOUS at 17:27

## 2019-02-16 RX ADMIN — INSULIN ASPART 1 UNIT: 100 INJECTION, SOLUTION INTRAVENOUS; SUBCUTANEOUS at 17:34

## 2019-02-16 RX ADMIN — INSULIN ASPART 1 UNIT: 100 INJECTION, SOLUTION INTRAVENOUS; SUBCUTANEOUS at 12:33

## 2019-02-16 NOTE — PN
Date/Time of Note


Date/Time of Note


DATE: 2/16/19 


TIME: 12:46





Assessment/Plan


VTE Prophylaxis


Risk score (from Ns)>0 risk:  5


SCD applied (from Ns):  No


SCD contraindicated:  other


Pharmacological prophylaxis:  apixaban





Lines/Catheters


IV Catheter Type (from [facility]):  Peripheral IV


Urinary Cath still in place:  No





Assessment/Plan


Hospital Course


S: Patient had no acute events overnight.





O: VS - see below


PHYSICAL EXAMINATION:


GENERAL:  lying in bed, in no acute distress.


HEENT:  Pupils equal, round and reactive to light.  Extraocular muscles are 


intact.


NECK:  Supple, no thyromegaly.


LUNGS:  Distant breath sounds bilaterally, less crackles heard at the bases.


CARDIOVASCULAR:  Irregularly irregular heart rate.  No rubs or gallops.


ABDOMEN:  Soft, nontender and nondistended.  Normal bowel sounds.  No rebound or


guarding.


MUSCULOSKELETAL:  1+ pitting edema in bilateral lower extremities to the mid 


calves.


NEUROLOGIC:  No focal deficits.


 





 


ASSESSMENT AND PLAN: 68-year-old male coming in with chest pressure, shortness 


of breath, lower extremity swelling, signs of congestive heart failure 


exacerbation, also rule out for acute coronary syndrome.





1.  Shortness of breath and chest pressure -overall slowly improving now.


   -Continue fluid restriction as recommended by cardiology and renal teams,  3 


times a day IV Lasix, lower dose metolazone, and low-dose beta-blocker


   -Continue to keep the head of the bed greater than 30 degrees


   - follow up Cardiology recommendations and continue digoxin for now.


   -Off of Entresto presently while in-house


2. Acute on chronic kidney disease.  Again, the kidney function appears to be 


slightly worse than when he was here 2 months ago.  Creatinine levels have been 


in the low to mid 2 range.  Patient having adequate urine output.


   -Follow-up recommendations from renal consult


   -Continue to carefully monitor BUN and creatinine levels and urine output. 


3.  Smoking history.  


   -Counseled on cessation. 


   -Continue nicotine patch.


4.  History of hypertension.  Again, see above.  


   - Continue current medications.  


   - Also hydralazine p.r.n.


5.  History of atrial fibrillation -appears stable


   -Monitor, continue Eliquis as well.


6.  Type 2 diabetes.  A1c equals 9.0, sugars presently stable 


   - continue Lantus and sliding scale insulin.


7.  Deep venous thrombosis prophylaxis-on Eliquis.


Result Diagram:  


2/16/19 0600                                                                    


           2/16/19 0600





Results 24hrs





Laboratory Tests


Test
                 2/15/19
17:06  2/15/19
21:19  2/16/19
03:58  2/16/19
06:00


Bedside Glucose               206           309  H          130


White Blood Count                                                          6.6


Red Blood Count                                                          4.50  L


Hemoglobin                                                               10.9  L


Hematocrit                                                               37.5  L


Mean Corpuscular                                                          83.3


Volume


Mean Corpuscular                                                         24.2  L


Hemoglobin


Mean Corpuscular      
              
              
                   29.1  L



Hemoglobin
Concent


Red Cell                                                                 18.6  H


Distribution Width


Platelet Count                                                             186


Mean Platelet Volume                                                     12.8  H


Immature                                                                 0.300


Granulocytes %


Neutrophils %                                                             71.6


Lymphocytes %                                                             15.5


Monocytes %                                                                9.0


Eosinophils %                                                              2.7


Basophils %                                                                0.9


Nucleated Red Blood                                                        0.0


Cells %


Immature                                                                 0.020


Granulocytes #


Neutrophils #                                                              4.7


Lymphocytes #                                                              1.0


Monocytes #                                                                0.6


Eosinophils #                                                              0.2


Basophils #                                                                0.1


Nucleated Red Blood                                                        0.0


Cells #


Sodium Level                                                               141


Potassium Level                                                            4.7


Chloride Level                                                              97


Carbon Dioxide Level                                                       36  H


Anion Gap                                                                    8


Blood Urea Nitrogen                                                        62  H


Creatinine                                                               2.41  H


Est Glomerular        
              
              
                     27  L



Filtrat Rate
mL/min


Glucose Level                                                              201


Calcium Level                                                              9.3


Test
                 2/16/19
08:34  2/16/19
12:08  
              



Bedside Glucose               179            212








Exam/Review of Systems


Exam


Vitals





Vital Signs


  Date      Temp  Pulse  Resp  B/P (MAP)   Pulse Ox  O2          O2 Flow    FiO2


Time                                                 Delivery    Rate


   2/16/19  97.7     58    16      105/59       100  Nasal             4.0


     11:20                           (74)            Cannula


   2/14/19                                                                    50


     13:23








Intake and Output





2/15/19


2/15/19


2/16/19





1515:00


23:00


07:00





IntakeIntake Total


1100 ml


400 ml





OutputOutput Total


3375 ml


1075 ml





BalanceBalance


-2275 ml


-675 ml














Results


Results 24hrs





Laboratory Tests


Test
                 2/15/19
17:06  2/15/19
21:19  2/16/19
03:58  2/16/19
06:00


Bedside Glucose               206           309  H          130


White Blood Count                                                          6.6


Red Blood Count                                                          4.50  L


Hemoglobin                                                               10.9  L


Hematocrit                                                               37.5  L


Mean Corpuscular                                                          83.3


Volume


Mean Corpuscular                                                         24.2  L


Hemoglobin


Mean Corpuscular      
              
              
                   29.1  L



Hemoglobin
Concent


Red Cell                                                                 18.6  H


Distribution Width


Platelet Count                                                             186


Mean Platelet Volume                                                     12.8  H


Immature                                                                 0.300


Granulocytes %


Neutrophils %                                                             71.6


Lymphocytes %                                                             15.5


Monocytes %                                                                9.0


Eosinophils %                                                              2.7


Basophils %                                                                0.9


Nucleated Red Blood                                                        0.0


Cells %


Immature                                                                 0.020


Granulocytes #


Neutrophils #                                                              4.7


Lymphocytes #                                                              1.0


Monocytes #                                                                0.6


Eosinophils #                                                              0.2


Basophils #                                                                0.1


Nucleated Red Blood                                                        0.0


Cells #


Sodium Level                                                               141


Potassium Level                                                            4.7


Chloride Level                                                              97


Carbon Dioxide Level                                                       36  H


Anion Gap                                                                    8


Blood Urea Nitrogen                                                        62  H


Creatinine                                                               2.41  H


Est Glomerular        
              
              
                     27  L



Filtrat Rate
mL/min


Glucose Level                                                              201


Calcium Level                                                              9.3


Test
                 2/16/19
08:34  2/16/19
12:08  
              



Bedside Glucose               179            212








Medications


Medication





Current Medications


IV Flush (NS 3 ml) 3 ml PER PROTOCOL IV ;  Start 2/11/19 at 14:00


Ondansetron HCl (Zofran Inj) 4 mg Q6H  PRN IV NAUSEA/VOMITING Last administered 


on 2/13/19at 04:47; Admin Dose 4 MG;  Start 2/11/19 at 14:00


Acetaminophen (Tylenol Tab) 650 mg Q6H  PRN PO .PAIN 1-3 OR TEMP Last 


administered on 2/13/19at 13:07; Admin Dose 650 MG;  Start 2/11/19 at 14:00


Acetaminophen/ Hydrocodone Bitart (Norco (5/325)) 1 tab Q6H  PRN PO .MOD PAIN 4-


6 Last administered on 2/12/19at 13:01; Admin Dose 1 TAB;  Start 2/11/19 at 


14:00


Morphine Sulfate (morphine) 6 mg Q4H  PRN PO .SEVERE PAIN 7-10;  Start 2/11/19 


at 14:00


Docusate Sodium (Colace) 100 mg Q12H  PRN PO .CONSTIPATION;  Start 2/11/19 at 


14:00


Magnesium Hydroxide (Milk Of Mag) 30 ml DAILY  PRN PO .CONSTIPATION Last 


administered on 2/12/19at 18:09; Admin Dose 30 ML;  Start 2/11/19 at 14:00


Lorazepam (Ativan) 0.5 mg Q6H  PRN PO ANXIETY;  Start 2/11/19 at 14:00


Albuterol/ Ipratropium (Duoneb) 3 ml Q4H RESP THERAPY  PRN HHN SHORTNESS OF 


BREATH Last administered on 2/14/19at 23:35; Admin Dose 3 ML;  Start 2/11/19 at 


14:00


Hydralazine HCl (Apresoline) 10 mg Q6H  PRN IV ELEVATED BLOOD PRESSURE;  Start 


2/11/19 at 14:00


Nitroglycerin (Nitroglycerin (Sl Tab) 0.4 Mg) 1 tab Q5M  PRN SL ANGINA;  Start 


2/11/19 at 14:00


Apixaban (Eliquis) 5 mg BID PO  Last administered on 2/16/19at 08:27; Admin Dose


5 MG;  Start 2/11/19 at 21:00


Digoxin (Digoxin) 0.125 mg DAILY@1300 PO  Last administered on 2/16/19at 12:10; 


Admin Dose 0.125 MG;  Start 2/12/19 at 13:00


Ergocalciferol (Drisdol) 50,000 unit Q7D PO  Last administered on 2/11/19at 


18:03; Admin Dose 50,000 UNIT;  Start 2/11/19 at 14:00


Ferrous Sulfate (Ferrous Sulfate (Ec)) 325 mg BID PO  Last administered on 


2/16/19at 08:27; Admin Dose 325 MG;  Start 2/11/19 at 21:00


Insulin Glargine (Lantus) 12 units QHS SC  Last administered on 2/15/19at 21:23;


Admin Dose 12 UNITS;  Start 2/11/19 at 21:00


Famotidine (Pepcid) 20 mg DAILY PO  Last administered on 2/16/19at 08:27; Admin 


Dose 20 MG;  Start 2/12/19 at 09:00


Atorvastatin Calcium (Lipitor) 80 mg HS PO  Last administered on 2/15/19at 


21:10; Admin Dose 80 MG;  Start 2/11/19 at 21:00


Diagnostic Test (Pha) (Accu-Chek) 1 ea 02 XX ;  Start 2/12/19 at 02:00


Miscellaneous Information 1 ea NOTE XX ;  Start 2/11/19 at 14:30


Glucose (Glutose) 15 gm Q15M  PRN PO DECREASED GLUCOSE;  Start 2/11/19 at 14:30


Glucose (Glutose) 22.5 gm Q15M  PRN PO DECREASED GLUCOSE;  Start 2/11/19 at 


14:30


Dextrose (D50w Syringe) 25 ml Q15M  PRN IV DECREASED GLUCOSE;  Start 2/11/19 at 


14:30


Dextrose (D50w Syringe) 50 ml Q15M  PRN IV DECREASED GLUCOSE;  Start 2/11/19 at 


14:30


Glucagon (Glucagen) 1 mg Q15M  PRN IM DECREASED GLUCOSE;  Start 2/11/19 at 14:30


Glucose (Glutose) 15 gm Q15M  PRN BUCCAL DECREASED GLUCOSE Last administered on 


2/14/19at 08:36; Admin Dose 15 GM;  Start 2/11/19 at 14:30


Nicotine (Nicoderm 21 Mg/ 24hr) 1 patch DAILY TRANSDERM ;  Start 2/11/19 at 


16:30


Metoprolol Succinate (Toprol Xl) 25 mg DAILY PO  Last administered on 2/16/19at 


08:27; Admin Dose 25 MG;  Start 2/12/19 at 09:00


Patient Own Medication 1 ea BID PO  Last administered on 2/12/19at 08:09; Admin 


Dose 1 EA;  Start 2/11/19 at 21:00;  Status Hold


Famotidine (Pepcid) 20 mg Q24H PO  Last administered on 2/15/19at 21:11; Admin 


Dose 20 MG;  Start 2/11/19 at 22:00


Insulin Aspart (Novolog Insulin Pen) (Adult SC Insulin - Mild Algorithm)... AC 


MEALS AND  BEDTIME SC  Last administered on 2/16/19at 12:33; Admin Dose 2 UNIT; 


Start 2/12/19 at 17:25


Miscellaneous Information Patients own medicat... BID@10,16 XX  Last 


administered on 2/15/19at 16:28; Admin Dose 1,600 EA;  Start 2/13/19 at 10:00


Furosemide (Lasix) 40 mg 0900,1300,1700 IV  Last administered on 2/16/19at 


12:09; Admin Dose 40 MG;  Start 2/13/19 at 17:00


Metolazone (Zaroxolyn) 2.5 mg BID PO  Last administered on 2/16/19at 08:27; 


Admin Dose 2.5 MG;  Start 2/13/19 at 17:15


Doxazosin Mesylate (Cardura) 4 mg HS PO  Last administered on 2/15/19at 21:14; 


Admin Dose 4 MG;  Start 2/14/19 at 21:00


Trazodone HCl (Desyrel) 50 mg HS PO ;  Start 2/16/19 at 00:30











GORDO MCDERMOTT              Feb 16, 2019 12:48

## 2019-02-16 NOTE — CONS
Assessment/Plan


Assessment/Plan


Hospital Course (Demo Recall)


1.  Shortness of breath with bilateral lower extremity edema, orthopnea and PND 


likely secondary to acute on chronic congestive heart failure.


2.  Acute on chronic renal failure; however, the patient's baseline creatinine 


ranges from 1.9 to 2.  On last discharge in December, the creatinine was 1.9.  


This could be all secondary to cardiorenal as the patient is clearly in 


decompensated heart failure right now., pt had Renal U/S in 12/18 which showed 


chronic kidney disease, 


3.  History of atrial fibrillation.


4.  Hypertension.


5.  Diabetes.


6.  Hyperlipidemia.


7.  BPH.


8.  Positive troponin with chest pain ? NSTEMI. Per cardiology CHF EF 35%


9.  Elevated BNP.


 10 Microcytic hypochromic anemia


11. Overweight


12. fatty liver infiltration


13. s/p cholecystectomy


Assessment/Plan (Daily)


-cw with Lasix of 40 IV 2 times daily/metolazone


- cr is fluctuating 2.33 from 2.5 > to monitor


-  Digoxin levels normal.


-Renal US is normal


- Keep a systolic blood pressure of greater than 100 to have adequate renal 


perfusion


-   Renally dose all meds.


-   Avoid nephrotoxic agents.





Consultation Date/Type/Reason


Admit Date/Time


Feb 11, 2019 at 13:13


Initial Consult Date


2/11/2019


Type of Consult


nephrology


Requesting Provider:  GORDO MCDERMOTT


Date/Time of Note


DATE: 2/16/19 


TIME: 13:16





24 HR Interval Summary


Constitutional:  requiring O2





Exam/Review of Systems


Exam


Vitals





Vital Signs


  Date      Temp  Pulse  Resp  B/P (MAP)   Pulse Ox  O2          O2 Flow    FiO2


Time                                                 Delivery    Rate


   2/16/19  97.7     58    16      105/59       100  Nasal             4.0


     11:20                           (74)            Cannula


   2/14/19                                                                    50


     13:23








Intake and Output





2/15/19


2/15/19


2/16/19





1515:00


23:00


07:00





IntakeIntake Total


1100 ml


400 ml





OutputOutput Total


3375 ml


1075 ml





BalanceBalance


-2275 ml


-675 ml











Constitutional:  alert, oriented


Head:  normocephalic


Eyes:  nl conjunctiva


Neck:  supple


Respiratory:  diminished breath sounds, labored breathing


Cardiovascular:  regular rate and rhythm


Gastrointestinal:  soft


Musculoskeletal:  muscle weakness, swelling; 


   No nl extremities to inspection, No nl gait and stance, No joint tenderness, 


No muscle tone, No range of motion, No spine non-tender, No other





Results


Result Diagram:  


2/16/19 0600                                                                    


           2/16/19 0600





Results 24hrs





Laboratory Tests


Test
                 2/15/19
17:06  2/15/19
21:19  2/16/19
03:58  2/16/19
06:00


Bedside Glucose               206           309  H          130


White Blood Count                                                          6.6


Red Blood Count                                                          4.50  L


Hemoglobin                                                               10.9  L


Hematocrit                                                               37.5  L


Mean Corpuscular                                                          83.3


Volume


Mean Corpuscular                                                         24.2  L


Hemoglobin


Mean Corpuscular      
              
              
                   29.1  L



Hemoglobin
Concent


Red Cell                                                                 18.6  H


Distribution Width


Platelet Count                                                             186


Mean Platelet Volume                                                     12.8  H


Immature                                                                 0.300


Granulocytes %


Neutrophils %                                                             71.6


Lymphocytes %                                                             15.5


Monocytes %                                                                9.0


Eosinophils %                                                              2.7


Basophils %                                                                0.9


Nucleated Red Blood                                                        0.0


Cells %


Immature                                                                 0.020


Granulocytes #


Neutrophils #                                                              4.7


Lymphocytes #                                                              1.0


Monocytes #                                                                0.6


Eosinophils #                                                              0.2


Basophils #                                                                0.1


Nucleated Red Blood                                                        0.0


Cells #


Sodium Level                                                               141


Potassium Level                                                            4.7


Chloride Level                                                              97


Carbon Dioxide Level                                                       36  H


Anion Gap                                                                    8


Blood Urea Nitrogen                                                        62  H


Creatinine                                                               2.41  H


Est Glomerular        
              
              
                     27  L



Filtrat Rate
mL/min


Glucose Level                                                              201


Calcium Level                                                              9.3


Test
                 2/16/19
08:34  2/16/19
12:08  
              



Bedside Glucose               179            212








Medications


Medication





Current Medications


IV Flush (NS 3 ml) 3 ml PER PROTOCOL IV ;  Start 2/11/19 at 14:00


Ondansetron HCl (Zofran Inj) 4 mg Q6H  PRN IV NAUSEA/VOMITING Last administered 


on 2/13/19at 04:47; Admin Dose 4 MG;  Start 2/11/19 at 14:00


Acetaminophen (Tylenol Tab) 650 mg Q6H  PRN PO .PAIN 1-3 OR TEMP Last 


administered on 2/13/19at 13:07; Admin Dose 650 MG;  Start 2/11/19 at 14:00


Acetaminophen/ Hydrocodone Bitart (Norco (5/325)) 1 tab Q6H  PRN PO .MOD PAIN 4-


6 Last administered on 2/12/19at 13:01; Admin Dose 1 TAB;  Start 2/11/19 at 


14:00


Morphine Sulfate (morphine) 6 mg Q4H  PRN PO .SEVERE PAIN 7-10;  Start 2/11/19 


at 14:00


Docusate Sodium (Colace) 100 mg Q12H  PRN PO .CONSTIPATION;  Start 2/11/19 at 


14:00


Magnesium Hydroxide (Milk Of Mag) 30 ml DAILY  PRN PO .CONSTIPATION Last 


administered on 2/12/19at 18:09; Admin Dose 30 ML;  Start 2/11/19 at 14:00


Lorazepam (Ativan) 0.5 mg Q6H  PRN PO ANXIETY;  Start 2/11/19 at 14:00


Albuterol/ Ipratropium (Duoneb) 3 ml Q4H RESP THERAPY  PRN HHN SHORTNESS OF 


BREATH Last administered on 2/14/19at 23:35; Admin Dose 3 ML;  Start 2/11/19 at 


14:00


Hydralazine HCl (Apresoline) 10 mg Q6H  PRN IV ELEVATED BLOOD PRESSURE;  Start 


2/11/19 at 14:00


Nitroglycerin (Nitroglycerin (Sl Tab) 0.4 Mg) 1 tab Q5M  PRN SL ANGINA;  Start 


2/11/19 at 14:00


Apixaban (Eliquis) 5 mg BID PO  Last administered on 2/16/19at 08:27; Admin Dose


5 MG;  Start 2/11/19 at 21:00


Digoxin (Digoxin) 0.125 mg DAILY@1300 PO  Last administered on 2/16/19at 12:10; 


Admin Dose 0.125 MG;  Start 2/12/19 at 13:00


Ergocalciferol (Drisdol) 50,000 unit Q7D PO  Last administered on 2/11/19at 


18:03; Admin Dose 50,000 UNIT;  Start 2/11/19 at 14:00


Ferrous Sulfate (Ferrous Sulfate (Ec)) 325 mg BID PO  Last administered on 2/1 6/19at 08:27; Admin Dose 325 MG;  Start 2/11/19 at 21:00


Insulin Glargine (Lantus) 12 units QHS SC  Last administered on 2/15/19at 21:23;


Admin Dose 12 UNITS;  Start 2/11/19 at 21:00


Famotidine (Pepcid) 20 mg DAILY PO  Last administered on 2/16/19at 08:27; Admin 


Dose 20 MG;  Start 2/12/19 at 09:00


Atorvastatin Calcium (Lipitor) 80 mg HS PO  Last administered on 2/15/19at 


21:10; Admin Dose 80 MG;  Start 2/11/19 at 21:00


Diagnostic Test (Pha) (Accu-Chek) 1 ea 02 XX ;  Start 2/12/19 at 02:00


Miscellaneous Information 1 ea NOTE XX ;  Start 2/11/19 at 14:30


Glucose (Glutose) 15 gm Q15M  PRN PO DECREASED GLUCOSE;  Start 2/11/19 at 14:30


Glucose (Glutose) 22.5 gm Q15M  PRN PO DECREASED GLUCOSE;  Start 2/11/19 at 


14:30


Dextrose (D50w Syringe) 25 ml Q15M  PRN IV DECREASED GLUCOSE;  Start 2/11/19 at 


14:30


Dextrose (D50w Syringe) 50 ml Q15M  PRN IV DECREASED GLUCOSE;  Start 2/11/19 at 


14:30


Glucagon (Glucagen) 1 mg Q15M  PRN IM DECREASED GLUCOSE;  Start 2/11/19 at 14:30


Glucose (Glutose) 15 gm Q15M  PRN BUCCAL DECREASED GLUCOSE Last administered on 


2/14/19at 08:36; Admin Dose 15 GM;  Start 2/11/19 at 14:30


Nicotine (Nicoderm 21 Mg/ 24hr) 1 patch DAILY TRANSDERM ;  Start 2/11/19 at 


16:30


Metoprolol Succinate (Toprol Xl) 25 mg DAILY PO  Last administered on 2/16/19at 


08:27; Admin Dose 25 MG;  Start 2/12/19 at 09:00


Patient Own Medication 1 ea BID PO  Last administered on 2/12/19at 08:09; Admin 


Dose 1 EA;  Start 2/11/19 at 21:00;  Status Hold


Famotidine (Pepcid) 20 mg Q24H PO  Last administered on 2/15/19at 21:11; Admin 


Dose 20 MG;  Start 2/11/19 at 22:00


Insulin Aspart (Novolog Insulin Pen) (Adult SC Insulin - Mild Algorithm)... AC 


MEALS AND  BEDTIME SC  Last administered on 2/16/19at 12:33; Admin Dose 2 UNIT; 


Start 2/12/19 at 17:25


Miscellaneous Information Patients own medicat... BID@10,16 XX  Last 


administered on 2/15/19at 16:28; Admin Dose 1,600 EA;  Start 2/13/19 at 10:00


Furosemide (Lasix) 40 mg 0900,1300,1700 IV  Last administered on 2/16/19at 


12:09; Admin Dose 40 MG;  Start 2/13/19 at 17:00


Metolazone (Zaroxolyn) 2.5 mg BID PO  Last administered on 2/16/19at 08:27; Ad


min Dose 2.5 MG;  Start 2/13/19 at 17:15


Doxazosin Mesylate (Cardura) 4 mg HS PO  Last administered on 2/15/19at 21:14; 


Admin Dose 4 MG;  Start 2/14/19 at 21:00


Trazodone HCl (Desyrel) 50 mg HS PO ;  Start 2/16/19 at 00:30











CARA VILLAREAL                Feb 16, 2019 13:17

## 2019-02-16 NOTE — CONS
Consult Date/Type/Reason


Admit Date/Time


Feb 11, 2019 at 13:13


Initial Consult Date





Requesting Provider:  GORDO MCDERMOTT


Date/Time of Note


DATE: 2/16/19 


TIME: 15:05





Subjective


NO acute events - BP in good range - much improved with diuresis - Cr high - 


con't gent;le duresis and afterload reduction to goal. Pt determined to get 


better. 





ROS: No fever, no chills, no nausea, no vomiting, no diarrhea/constipation


        No recent weight changes


        No chest pain, no PND, no orthopnea - much better SOB 


        No dizziness, blurred vision


        No thirst, no heat or cold intolerance





Objective


Vitals





Vital Signs


  Date      Temp  Pulse  Resp  B/P (MAP)   Pulse Ox  O2          O2 Flow    FiO2


Time                                                 Delivery    Rate


   2/16/19           69


     12:00


   2/16/19  97.7           16      105/59       100  Nasal             4.0


     11:20                           (74)            Cannula


   2/14/19                                                                    50


     13:23








Intake and Output





2/15/19


2/15/19


2/16/19





1515:00


23:00


07:00





IntakeIntake Total


1100 ml


400 ml





OutputOutput Total


3375 ml


1075 ml





BalanceBalance


-2275 ml


-675 ml











Exam


General: WN/WD/NAD, AOx 3


HEENT: Unicetric/atraumatic/EOMI (follow commands)


NECK: JVD elevated, no thyromegaly


Lymph: no lymphadenopathy


HEART: regular with no S3, II/VI systolic murmur at apex, PMI L - ICD in place 


LUNGS: Coarse sounds


ABD: soft, NT, ND, +BS


: Intact


Neuro: non focal


SKIN: chronic changes


EXT: trace edema





Results/Medications


Result Diagram:  


2/16/19 0600                                                                    


           2/16/19 0600





Results 24 hrs





Laboratory Tests


Test
                 2/15/19
17:06  2/15/19
21:19  2/16/19
03:58  2/16/19
06:00


Bedside Glucose               206           309  H          130


White Blood Count                                                          6.6


Red Blood Count                                                          4.50  L


Hemoglobin                                                               10.9  L


Hematocrit                                                               37.5  L


Mean Corpuscular                                                          83.3


Volume


Mean Corpuscular                                                         24.2  L


Hemoglobin


Mean Corpuscular      
              
              
                   29.1  L



Hemoglobin
Concent


Red Cell                                                                 18.6  H


Distribution Width


Platelet Count                                                             186


Mean Platelet Volume                                                     12.8  H


Immature                                                                 0.300


Granulocytes %


Neutrophils %                                                             71.6


Lymphocytes %                                                             15.5


Monocytes %                                                                9.0


Eosinophils %                                                              2.7


Basophils %                                                                0.9


Nucleated Red Blood                                                        0.0


Cells %


Immature                                                                 0.020


Granulocytes #


Neutrophils #                                                              4.7


Lymphocytes #                                                              1.0


Monocytes #                                                                0.6


Eosinophils #                                                              0.2


Basophils #                                                                0.1


Nucleated Red Blood                                                        0.0


Cells #


Sodium Level                                                               141


Potassium Level                                                            4.7


Chloride Level                                                              97


Carbon Dioxide Level                                                       36  H


Anion Gap                                                                    8


Blood Urea Nitrogen                                                        62  H


Creatinine                                                               2.41  H


Est Glomerular        
              
              
                     27  L



Filtrat Rate
mL/min


Glucose Level                                                              201


Calcium Level                                                              9.3


Test
                 2/16/19
08:34  2/16/19
12:08  
              



Bedside Glucose               179            212





Home Meds


Reported Medications


Metoprolol Succinate* (Toprol XL*) 25 Mg Tab.sr.24h, 25 MG PO DAILY, #30 TAB


   2/11/19


Sacubitril/Valsartan (Entresto 97 mg-103 mg Tablet) 1 Each Tablet, 1 TAB PO BID


   12/7/18


Digoxin* (Digox*) 125 Mcg Tablet, 0.125 MG PO DAILY, TAB


   12/7/18


Apixaban* (Eliquis*) 5 Mg Tablet, 5 MG PO BID, TAB


   3/14/16


Insulin Aspart* (Novolog Insulin Vial*) 100 U/Ml Vial, 0 SC SLIDING SCALE AC, 


VIAL


   3/14/16


Insulin Glargine* (Lantus*) 100 Unit/Ml Soln, 12 UNIT SC QHS, #1 VIAL


   3/14/16


Esomeprazole Mag Trihydrate (Nexium) 40 Mg Capsule.dr, 40 MG PO DAILY, #30 CAP


   3/14/16


Furosemide* (Furosemide*) 40 Mg Tablet, 40 MG PO DAILY, TAB


   3/14/16


Amlodipine Besylate* (Norvasc*) 5 Mg Tablet, 5 MG PO DAILY, TAB


   9/15/15


Rosuvastatin Calcium* (Crestor*) 20 Mg Tablet, 20 MG PO HS, TAB


   9/15/15


Ergocalciferol* (Drisdol* (Vitamin D2)) 50,000 Unit Capsule, 26058 UNIT PO Q7D, 


CAP


    MON[place]


   9/15/15


Doxazosin Mesylate* (Doxazosin Mesylate*) 4 Mg Tablet, 4 MG PO BID, TAB


   3/13/15


Ferrous Sulfate* (Ferrous Sulfate*) 325 Mg Tabec, 325 MG PO BID, TAB


   3/13/15


Discontinued Reported Medications


Metoprolol Succinate* (Toprol XL*) 50 Mg Tab.er.24h, 50 MG PO DAILY, #30 TAB


   3/14/16


Valsartan* (Diovan*) 160 Mg Tablet, 160 MG PO DAILY, TAB


   9/15/15


Discontinued Scripts


Levofloxacin* (Levaquin*) 750 Mg Tablet, 750 MG PO Q48H, #3 TAB


   Prov:ROSANNE HERNÁNDEZ MD         12/13/18


Medications





Current Medications


IV Flush (NS 3 ml) 3 ml PER PROTOCOL IV ;  Start 2/11/19 at 14:00


Ondansetron HCl (Zofran Inj) 4 mg Q6H  PRN IV NAUSEA/VOMITING Last administered 


on 2/13/19at 04:47; Admin Dose 4 MG;  Start 2/11/19 at 14:00


Acetaminophen (Tylenol Tab) 650 mg Q6H  PRN PO .PAIN 1-3 OR TEMP Last 


administered on 2/13/19at 13:07; Admin Dose 650 MG;  Start 2/11/19 at 14:00


Acetaminophen/ Hydrocodone Bitart (Norco (5/325)) 1 tab Q6H  PRN PO .MOD PAIN 4-


6 Last administered on 2/12/19at 13:01; Admin Dose 1 TAB;  Start 2/11/19 at 


14:00


Morphine Sulfate (morphine) 6 mg Q4H  PRN PO .SEVERE PAIN 7-10;  Start 2/11/19 


at 14:00


Docusate Sodium (Colace) 100 mg Q12H  PRN PO .CONSTIPATION;  Start 2/11/19 at 


14:00


Magnesium Hydroxide (Milk Of Mag) 30 ml DAILY  PRN PO .CONSTIPATION Last 


administered on 2/12/19at 18:09; Admin Dose 30 ML;  Start 2/11/19 at 14:00


Lorazepam (Ativan) 0.5 mg Q6H  PRN PO ANXIETY;  Start 2/11/19 at 14:00


Albuterol/ Ipratropium (Duoneb) 3 ml Q4H RESP THERAPY  PRN HHN SHORTNESS OF 


BREATH Last administered on 2/14/19at 23:35; Admin Dose 3 ML;  Start 2/11/19 at 


14:00


Hydralazine HCl (Apresoline) 10 mg Q6H  PRN IV ELEVATED BLOOD PRESSURE;  Start 


2/11/19 at 14:00


Nitroglycerin (Nitroglycerin (Sl Tab) 0.4 Mg) 1 tab Q5M  PRN SL ANGINA;  Start 


2/11/19 at 14:00


Apixaban (Eliquis) 5 mg BID PO  Last administered on 2/16/19at 08:27; Admin Dose


5 MG;  Start 2/11/19 at 21:00


Digoxin (Digoxin) 0.125 mg DAILY@1300 PO  Last administered on 2/16/19at 12:10; 


Admin Dose 0.125 MG;  Start 2/12/19 at 13:00


Ergocalciferol (Drisdol) 50,000 unit Q7D PO  Last administered on 2/11/19at 


18:03; Admin Dose 50,000 UNIT;  Start 2/11/19 at 14:00


Ferrous Sulfate (Ferrous Sulfate (Ec)) 325 mg BID PO  Last administered on 


2/16/19at 08:27; Admin Dose 325 MG;  Start 2/11/19 at 21:00


Insulin Glargine (Lantus) 12 units QHS SC  Last administered on 2/15/19at 21:23;


Admin Dose 12 UNITS;  Start 2/11/19 at 21:00


Famotidine (Pepcid) 20 mg DAILY PO  Last administered on 2/16/19at 08:27; Admin 


Dose 20 MG;  Start 2/12/19 at 09:00


Atorvastatin Calcium (Lipitor) 80 mg HS PO  Last administered on 2/15/19at 


21:10; Admin Dose 80 MG;  Start 2/11/19 at 21:00


Diagnostic Test (Pha) (Accu-Chek) 1 ea 02 XX ;  Start 2/12/19 at 02:00


Miscellaneous Information 1 ea NOTE XX ;  Start 2/11/19 at 14:30


Glucose (Glutose) 15 gm Q15M  PRN PO DECREASED GLUCOSE;  Start 2/11/19 at 14:30


Glucose (Glutose) 22.5 gm Q15M  PRN PO DECREASED GLUCOSE;  Start 2/11/19 at 


14:30


Dextrose (D50w Syringe) 25 ml Q15M  PRN IV DECREASED GLUCOSE;  Start 2/11/19 at 


14:30


Dextrose (D50w Syringe) 50 ml Q15M  PRN IV DECREASED GLUCOSE;  Start 2/11/19 at 


14:30


Glucagon (Glucagen) 1 mg Q15M  PRN IM DECREASED GLUCOSE;  Start 2/11/19 at 14:30


Glucose (Glutose) 15 gm Q15M  PRN BUCCAL DECREASED GLUCOSE Last administered on 


2/14/19at 08:36; Admin Dose 15 GM;  Start 2/11/19 at 14:30


Nicotine (Nicoderm 21 Mg/ 24hr) 1 patch DAILY TRANSDERM ;  Start 2/11/19 at 


16:30


Metoprolol Succinate (Toprol Xl) 25 mg DAILY PO  Last administered on 2/16/19at 


08:27; Admin Dose 25 MG;  Start 2/12/19 at 09:00


Patient Own Medication 1 ea BID PO  Last administered on 2/12/19at 08:09; Admin 


Dose 1 EA;  Start 2/11/19 at 21:00;  Status Hold


Famotidine (Pepcid) 20 mg Q24H PO  Last administered on 2/15/19at 21:11; Admin 


Dose 20 MG;  Start 2/11/19 at 22:00


Insulin Aspart (Novolog Insulin Pen) (Adult SC Insulin - Mild Algorithm)... AC 


MEALS AND  BEDTIME SC  Last administered on 2/16/19at 12:33; Admin Dose 2 UNIT; 


Start 2/12/19 at 17:25


Miscellaneous Information Patients own medicat... BID@10,16 XX  Last admi


nistered on 2/15/19at 16:28; Admin Dose 1,600 EA;  Start 2/13/19 at 10:00


Furosemide (Lasix) 40 mg 0900,1300,1700 IV  Last administered on 2/16/19at 


12:09; Admin Dose 40 MG;  Start 2/13/19 at 17:00


Metolazone (Zaroxolyn) 2.5 mg BID PO  Last administered on 2/16/19at 08:27; 


Admin Dose 2.5 MG;  Start 2/13/19 at 17:15


Doxazosin Mesylate (Cardura) 4 mg HS PO  Last administered on 2/15/19at 21:14; 


Admin Dose 4 MG;  Start 2/14/19 at 21:00


Trazodone HCl (Desyrel) 50 mg HS PO ;  Start 2/16/19 at 00:30





Assessment/Plan


Hospital Course (Demo Recall)


1.CHF- systolic acute on chronic - responded wel to diuresis - con't Rx and 


afterload reduction. 


2.Cardiomyopathy- EF 30% - ICD in place. 


3.CKD - Cr at 2.4 - close to baseline - with diuresis. 


4.HTN - well Rx now. 


5.Dyslipidemia


6. Hepatomegaly-likley due to passive congestion of liver - primary follows.











DIO HARPER MD                 Feb 16, 2019 15:08

## 2019-02-17 VITALS — SYSTOLIC BLOOD PRESSURE: 121 MMHG | DIASTOLIC BLOOD PRESSURE: 63 MMHG | RESPIRATION RATE: 20 BRPM | HEART RATE: 63 BPM

## 2019-02-17 VITALS — DIASTOLIC BLOOD PRESSURE: 68 MMHG | RESPIRATION RATE: 19 BRPM | SYSTOLIC BLOOD PRESSURE: 101 MMHG | HEART RATE: 67 BPM

## 2019-02-17 VITALS — RESPIRATION RATE: 19 BRPM | SYSTOLIC BLOOD PRESSURE: 108 MMHG | DIASTOLIC BLOOD PRESSURE: 70 MMHG | HEART RATE: 50 BPM

## 2019-02-17 VITALS — HEART RATE: 57 BPM

## 2019-02-17 VITALS — DIASTOLIC BLOOD PRESSURE: 40 MMHG | RESPIRATION RATE: 20 BRPM | SYSTOLIC BLOOD PRESSURE: 96 MMHG | HEART RATE: 63 BPM

## 2019-02-17 VITALS — SYSTOLIC BLOOD PRESSURE: 114 MMHG | DIASTOLIC BLOOD PRESSURE: 56 MMHG | RESPIRATION RATE: 20 BRPM | HEART RATE: 52 BPM

## 2019-02-17 VITALS — HEART RATE: 61 BPM | RESPIRATION RATE: 19 BRPM | DIASTOLIC BLOOD PRESSURE: 55 MMHG | SYSTOLIC BLOOD PRESSURE: 98 MMHG

## 2019-02-17 VITALS — HEART RATE: 62 BPM

## 2019-02-17 VITALS — HEART RATE: 70 BPM

## 2019-02-17 VITALS — HEART RATE: 78 BPM

## 2019-02-17 VITALS — HEART RATE: 54 BPM

## 2019-02-17 LAB
ADD MAN DIFF?: NO
ANION GAP: 9 (ref 5–13)
BASOPHIL #: 0.1 10^3/UL (ref 0–0.1)
BASOPHILS %: 1 % (ref 0–2)
BLOOD UREA NITROGEN: 72 MG/DL (ref 7–20)
CALCIUM: 9.3 MG/DL (ref 8.4–10.2)
CARBON DIOXIDE: 37 MMOL/L (ref 21–31)
CHLORIDE: 93 MMOL/L (ref 97–110)
CREATININE: 2.41 MG/DL (ref 0.61–1.24)
EOSINOPHILS #: 0.2 10^3/UL (ref 0–0.5)
EOSINOPHILS %: 3 % (ref 0–7)
GLUCOSE: 205 MG/DL (ref 70–220)
HEMATOCRIT: 36.3 % (ref 42–52)
HEMOGLOBIN: 10.7 G/DL (ref 14–18)
IMMATURE GRANS #M: 0.01 10^3/UL (ref 0–0.03)
IMMATURE GRANS % (M): 0.2 % (ref 0–0.43)
LYMPHOCYTES #: 1.1 10^3/UL (ref 0.8–2.9)
LYMPHOCYTES %: 18.1 % (ref 15–51)
MEAN CORPUSCULAR HEMOGLOBIN: 24.8 PG (ref 29–33)
MEAN CORPUSCULAR HGB CONC: 29.5 G/DL (ref 32–37)
MEAN CORPUSCULAR VOLUME: 84.2 FL (ref 82–101)
MEAN PLATELET VOLUME: 12.2 FL (ref 7.4–10.4)
MONOCYTE #: 0.7 10^3/UL (ref 0.3–0.9)
MONOCYTES %: 11.7 % (ref 0–11)
NEUTROPHIL #: 4 10^3/UL (ref 1.6–7.5)
NEUTROPHILS %: 66 % (ref 39–77)
NUCLEATED RED BLOOD CELLS #: 0 10^3/UL (ref 0–0)
NUCLEATED RED BLOOD CELLS%: 0 /100WBC (ref 0–0)
PLATELET COUNT: 158 10^3/UL (ref 140–415)
POTASSIUM: 4.5 MMOL/L (ref 3.5–5.1)
RED BLOOD COUNT: 4.31 10^6/UL (ref 4.7–6.1)
RED CELL DISTRIBUTION WIDTH: 19.5 % (ref 11.5–14.5)
SODIUM: 139 MMOL/L (ref 135–144)
WHITE BLOOD COUNT: 6.1 10^3/UL (ref 4.8–10.8)

## 2019-02-17 RX ADMIN — INSULIN ASPART 1 UNIT: 100 INJECTION, SOLUTION INTRAVENOUS; SUBCUTANEOUS at 08:13

## 2019-02-17 RX ADMIN — FAMOTIDINE 1 MG: 20 TABLET ORAL at 21:06

## 2019-02-17 RX ADMIN — APIXABAN SCH MG: 5 TABLET, FILM COATED ORAL at 08:05

## 2019-02-17 RX ADMIN — FUROSEMIDE 1 MG: 10 INJECTION, SOLUTION INTRAVENOUS at 08:06

## 2019-02-17 RX ADMIN — INSULIN GLARGINE SCH UNITS: 100 INJECTION, SOLUTION SUBCUTANEOUS at 20:40

## 2019-02-17 RX ADMIN — METOLAZONE 1 MG: 2.5 TABLET ORAL at 08:06

## 2019-02-17 RX ADMIN — APIXABAN 1 MG: 5 TABLET, FILM COATED ORAL at 20:35

## 2019-02-17 RX ADMIN — METOPROLOL SUCCINATE SCH MG: 25 TABLET, EXTENDED RELEASE ORAL at 08:07

## 2019-02-17 RX ADMIN — FAMOTIDINE SCH MG: 20 TABLET ORAL at 08:05

## 2019-02-17 RX ADMIN — INSULIN ASPART 1 UNIT: 100 INJECTION, SOLUTION INTRAVENOUS; SUBCUTANEOUS at 17:41

## 2019-02-17 RX ADMIN — DOXAZOSIN 1 MG: 4 TABLET ORAL at 20:40

## 2019-02-17 RX ADMIN — NICOTINE 1 PATCH: 21 PATCH, EXTENDED RELEASE TRANSDERMAL at 08:07

## 2019-02-17 RX ADMIN — ATORVASTATIN CALCIUM 1 MG: 80 TABLET, FILM COATED ORAL at 20:34

## 2019-02-17 RX ADMIN — FERROUS SULFATE TAB 325 MG (65 MG ELEMENTAL FE) SCH MG: 325 (65 FE) TAB at 08:05

## 2019-02-17 RX ADMIN — APIXABAN SCH MG: 5 TABLET, FILM COATED ORAL at 20:35

## 2019-02-17 RX ADMIN — FERROUS SULFATE TAB 325 MG (65 MG ELEMENTAL FE) 1 MG: 325 (65 FE) TAB at 20:34

## 2019-02-17 RX ADMIN — FAMOTIDINE 1 MG: 20 TABLET ORAL at 08:05

## 2019-02-17 RX ADMIN — METOPROLOL SUCCINATE 1 MG: 25 TABLET, EXTENDED RELEASE ORAL at 08:07

## 2019-02-17 RX ADMIN — ATORVASTATIN CALCIUM SCH MG: 80 TABLET, FILM COATED ORAL at 20:34

## 2019-02-17 RX ADMIN — DOXAZOSIN SCH MG: 4 TABLET ORAL at 20:40

## 2019-02-17 RX ADMIN — APIXABAN 1 MG: 5 TABLET, FILM COATED ORAL at 08:05

## 2019-02-17 RX ADMIN — FUROSEMIDE 1 MG: 10 INJECTION, SOLUTION INTRAVENOUS at 17:37

## 2019-02-17 RX ADMIN — DIGOXIN SCH MG: 125 TABLET ORAL at 13:00

## 2019-02-17 RX ADMIN — INSULIN ASPART 1 UNIT: 100 INJECTION, SOLUTION INTRAVENOUS; SUBCUTANEOUS at 20:41

## 2019-02-17 RX ADMIN — INSULIN ASPART 1 UNIT: 100 INJECTION, SOLUTION INTRAVENOUS; SUBCUTANEOUS at 11:53

## 2019-02-17 RX ADMIN — NICOTINE SCH PATCH: 21 PATCH, EXTENDED RELEASE TRANSDERMAL at 08:07

## 2019-02-17 RX ADMIN — DOXAZOSIN 1 MG: 4 TABLET ORAL at 20:35

## 2019-02-17 RX ADMIN — DIGOXIN 1 MG: 125 TABLET ORAL at 13:00

## 2019-02-17 RX ADMIN — FERROUS SULFATE TAB 325 MG (65 MG ELEMENTAL FE) SCH MG: 325 (65 FE) TAB at 20:34

## 2019-02-17 RX ADMIN — DOXAZOSIN SCH MG: 4 TABLET ORAL at 20:35

## 2019-02-17 RX ADMIN — INSULIN GLARGINE 1 UNITS: 100 INJECTION, SOLUTION SUBCUTANEOUS at 20:40

## 2019-02-17 RX ADMIN — METOLAZONE 1 MG: 2.5 TABLET ORAL at 20:34

## 2019-02-17 RX ADMIN — FERROUS SULFATE TAB 325 MG (65 MG ELEMENTAL FE) 1 MG: 325 (65 FE) TAB at 08:05

## 2019-02-17 RX ADMIN — FAMOTIDINE SCH MG: 20 TABLET ORAL at 21:06

## 2019-02-17 RX ADMIN — FUROSEMIDE 1 MG: 10 INJECTION, SOLUTION INTRAVENOUS at 13:16

## 2019-02-17 NOTE — PN
Date/Time of Note


Date/Time of Note


DATE: 2/17/19 


TIME: 13:17





Assessment/Plan


VTE Prophylaxis


Risk score (from Ns)>0 risk:  7


SCD applied (from Ns):  No


SCD contraindicated:  other


Pharmacological prophylaxis:  apixaban





Lines/Catheters


IV Catheter Type (from [facility]):  Peripheral IV


Urinary Cath still in place:  No





Assessment/Plan


Hospital Course


S: Patient had no acute events overnight, still on IV Lasix 3 times a day, seen 


by renal and cardiology teams yesterday.





O: VS - see below


PHYSICAL EXAMINATION:


GENERAL:  lying in bed, in no acute distress.


HEENT:  Pupils equal, round and reactive to light.  Extraocular muscles are 


intact.


NECK:  Supple, no thyromegaly.


LUNGS:  Distant breath sounds bilaterally, less crackles heard at the bases.


CARDIOVASCULAR:  Irregularly irregular heart rate.  No rubs or gallops.


ABDOMEN:  Soft, nontender and nondistended.  Normal bowel sounds.  No rebound or


guarding.


MUSCULOSKELETAL:  1+ pitting edema in bilateral lower extremities to the mid 


calves.


NEUROLOGIC:  No focal deficits.


 





 


ASSESSMENT AND PLAN: 68-year-old male coming in with chest pressure, shortness 


of breath, lower extremity swelling, signs of congestive heart failure 


exacerbation, also rule out for acute coronary syndrome.





1.  Shortness of breath and chest pressure -overall slowly improving now.


   -Continue fluid restriction as recommended by cardiology and renal teams, 


apparently for now still continue 3 times a day IV Lasix, lower dose metolazone,


and low-dose beta-blocker


   -Continue to keep the head of the bed greater than 30 degrees


   - follow up Cardiology recommendations and continue digoxin for now.


   -Off of Entresto presently while in-house


2. Acute on chronic kidney disease.  Again, the kidney function appears to be 


slightly worse than when he was here 2 months ago.  Creatinine levels have been 


in the low to mid 2 range.  Patient having adequate urine output.


   -Follow-up recommendations from renal consult


   -Continue to carefully monitor BUN and creatinine levels and urine output. 


3.  Smoking history.  


   -Counseled on cessation. 


   -Continue nicotine patch.


4.  History of hypertension.  Again, see above.  


   - Continue current medications.  


   - Also hydralazine p.r.n.


5.  History of atrial fibrillation -appears stable


   -Monitor, continue Eliquis as well.


6.  Type 2 diabetes.  A1c equals 9.0, sugars presently stable 


   - continue Lantus and sliding scale insulin.


7.  Deep venous thrombosis prophylaxis-on Eliquis.


Result Diagram:  


2/17/19 0540                                                                    


           2/17/19 0540





Results 24hrs





Laboratory Tests


Test
                 2/16/19
17:25  2/16/19
20:11  2/17/19
02:01  2/17/19
05:40


Bedside Glucose               143            209           238  H


White Blood Count                                                          6.1


Red Blood Count                                                          4.31  L


Hemoglobin                                                               10.7  L


Hematocrit                                                               36.3  L


Mean Corpuscular                                                          84.2


Volume


Mean Corpuscular                                                         24.8  L


Hemoglobin


Mean Corpuscular      
              
              
                   29.5  L



Hemoglobin
Concent


Red Cell                                                                 19.5  H


Distribution Width


Platelet Count                                                             158


Mean Platelet Volume                                                     12.2  H


Immature                                                                 0.200


Granulocytes %


Neutrophils %                                                             66.0


Lymphocytes %                                                             18.1


Monocytes %                                                              11.7  H


Eosinophils %                                                              3.0


Basophils %                                                                1.0


Nucleated Red Blood                                                        0.0


Cells %


Immature                                                                 0.010


Granulocytes #


Neutrophils #                                                              4.0


Lymphocytes #                                                              1.1


Monocytes #                                                                0.7


Eosinophils #                                                              0.2


Basophils #                                                                0.1


Nucleated Red Blood                                                        0.0


Cells #


Sodium Level                                                               139


Potassium Level                                                            4.5


Chloride Level                                                             93  L


Carbon Dioxide Level                                                       37  H


Anion Gap                                                                    9


Blood Urea Nitrogen                                                        72  H


Creatinine                                                               2.41  H


Est Glomerular        
              
              
                     27  L



Filtrat Rate
mL/min


Glucose Level                                                              205


Calcium Level                                                              9.3


Test
                 2/17/19
08:03  2/17/19
11:48  
              



Bedside Glucose               164            179








Exam/Review of Systems


Exam


Vitals





Vital Signs


  Date      Temp  Pulse  Resp  B/P (MAP)   Pulse Ox  O2          O2 Flow    FiO2


Time                                                 Delivery    Rate


   2/17/19           62


     12:00


   2/17/19  97.9           20      114/56        97


     11:19                           (75)


   2/17/19                                           Nasal             4.0


     07:21                                           Cannula


   2/14/19                                                                    50


     13:23








Intake and Output





2/16/19 2/16/19 2/17/19





1515:00


23:00


07:00





IntakeIntake Total


800 ml


480 ml





OutputOutput Total


1700 ml


900 ml





BalanceBalance


-900 ml


-420 ml














Results


Results 24hrs





Laboratory Tests


Test
                 2/16/19
17:25  2/16/19
20:11  2/17/19
02:01  2/17/19
05:40


Bedside Glucose               143            209           238  H


White Blood Count                                                          6.1


Red Blood Count                                                          4.31  L


Hemoglobin                                                               10.7  L


Hematocrit                                                               36.3  L


Mean Corpuscular                                                          84.2


Volume


Mean Corpuscular                                                         24.8  L


Hemoglobin


Mean Corpuscular      
              
              
                   29.5  L



Hemoglobin
Concent


Red Cell                                                                 19.5  H


Distribution Width


Platelet Count                                                             158


Mean Platelet Volume                                                     12.2  H


Immature                                                                 0.200


Granulocytes %


Neutrophils %                                                             66.0


Lymphocytes %                                                             18.1


Monocytes %                                                              11.7  H


Eosinophils %                                                              3.0


Basophils %                                                                1.0


Nucleated Red Blood                                                        0.0


Cells %


Immature                                                                 0.010


Granulocytes #


Neutrophils #                                                              4.0


Lymphocytes #                                                              1.1


Monocytes #                                                                0.7


Eosinophils #                                                              0.2


Basophils #                                                                0.1


Nucleated Red Blood                                                        0.0


Cells #


Sodium Level                                                               139


Potassium Level                                                            4.5


Chloride Level                                                             93  L


Carbon Dioxide Level                                                       37  H


Anion Gap                                                                    9


Blood Urea Nitrogen                                                        72  H


Creatinine                                                               2.41  H


Est Glomerular        
              
              
                     27  L



Filtrat Rate
mL/min


Glucose Level                                                              205


Calcium Level                                                              9.3


Test
                 2/17/19
08:03  2/17/19
11:48  
              



Bedside Glucose               164            179








Medications


Medication





Current Medications


IV Flush (NS 3 ml) 3 ml PER PROTOCOL IV ;  Start 2/11/19 at 14:00


Ondansetron HCl (Zofran Inj) 4 mg Q6H  PRN IV NAUSEA/VOMITING Last administered 


on 2/13/19at 04:47; Admin Dose 4 MG;  Start 2/11/19 at 14:00


Acetaminophen (Tylenol Tab) 650 mg Q6H  PRN PO .PAIN 1-3 OR TEMP Last 


administered on 2/13/19at 13:07; Admin Dose 650 MG;  Start 2/11/19 at 14:00


Acetaminophen/ Hydrocodone Bitart (Norco (5/325)) 1 tab Q6H  PRN PO .MOD PAIN 4-


6 Last administered on 2/12/19at 13:01; Admin Dose 1 TAB;  Start 2/11/19 at 


14:00


Morphine Sulfate (morphine) 6 mg Q4H  PRN PO .SEVERE PAIN 7-10;  Start 2/11/19 


at 14:00


Docusate Sodium (Colace) 100 mg Q12H  PRN PO .CONSTIPATION;  Start 2/11/19 at 


14:00


Magnesium Hydroxide (Milk Of Mag) 30 ml DAILY  PRN PO .CONSTIPATION Last 


administered on 2/12/19at 18:09; Admin Dose 30 ML;  Start 2/11/19 at 14:00


Lorazepam (Ativan) 0.5 mg Q6H  PRN PO ANXIETY;  Start 2/11/19 at 14:00


Albuterol/ Ipratropium (Duoneb) 3 ml Q4H RESP THERAPY  PRN HHN SHORTNESS OF 


BREATH Last administered on 2/14/19at 23:35; Admin Dose 3 ML;  Start 2/11/19 at 


14:00


Hydralazine HCl (Apresoline) 10 mg Q6H  PRN IV ELEVATED BLOOD PRESSURE;  Start 


2/11/19 at 14:00


Nitroglycerin (Nitroglycerin (Sl Tab) 0.4 Mg) 1 tab Q5M  PRN SL ANGINA;  Start 


2/11/19 at 14:00


Apixaban (Eliquis) 5 mg BID PO  Last administered on 2/17/19at 08:05; Admin Dose


5 MG;  Start 2/11/19 at 21:00


Digoxin (Digoxin) 0.125 mg DAILY@1300 PO  Last administered on 2/16/19at 12:10; 


Admin Dose 0.125 MG;  Start 2/12/19 at 13:00


Ergocalciferol (Drisdol) 50,000 unit Q7D PO  Last administered on 2/11/19at 


18:03; Admin Dose 50,000 UNIT;  Start 2/11/19 at 14:00


Ferrous Sulfate (Ferrous Sulfate (Ec)) 325 mg BID PO  Last administered on 


2/17/19at 08:05; Admin Dose 325 MG;  Start 2/11/19 at 21:00


Insulin Glargine (Lantus) 12 units QHS SC  Last administered on 2/16/19at 20:26;


Admin Dose 12 UNITS;  Start 2/11/19 at 21:00


Famotidine (Pepcid) 20 mg DAILY PO  Last administered on 2/17/19at 08:05; Admin 


Dose 20 MG;  Start 2/12/19 at 09:00


Atorvastatin Calcium (Lipitor) 80 mg HS PO  Last administered on 2/16/19at 


20:12; Admin Dose 80 MG;  Start 2/11/19 at 21:00


Diagnostic Test (Pha) (Accu-Chek) 1 ea 02 XX  Last administered on 2/17/19at 


02:29; Admin Dose 1 EA;  Start 2/12/19 at 02:00


Miscellaneous Information 1 ea NOTE XX ;  Start 2/11/19 at 14:30


Glucose (Glutose) 15 gm Q15M  PRN PO DECREASED GLUCOSE;  Start 2/11/19 at 14:30


Glucose (Glutose) 22.5 gm Q15M  PRN PO DECREASED GLUCOSE;  Start 2/11/19 at 


14:30


Dextrose (D50w Syringe) 25 ml Q15M  PRN IV DECREASED GLUCOSE;  Start 2/11/19 at 


14:30


Dextrose (D50w Syringe) 50 ml Q15M  PRN IV DECREASED GLUCOSE;  Start 2/11/19 at 


14:30


Glucagon (Glucagen) 1 mg Q15M  PRN IM DECREASED GLUCOSE;  Start 2/11/19 at 14:30


Glucose (Glutose) 15 gm Q15M  PRN BUCCAL DECREASED GLUCOSE Last administered on 


2/14/19at 08:36; Admin Dose 15 GM;  Start 2/11/19 at 14:30


Nicotine (Nicoderm 21 Mg/ 24hr) 1 patch DAILY TRANSDERM ;  Start 2/11/19 at 


16:30


Metoprolol Succinate (Toprol Xl) 25 mg DAILY PO  Last administered on 2/17/19at 


08:07; Admin Dose 25 MG;  Start 2/12/19 at 09:00


Patient Own Medication 1 ea BID PO  Last administered on 2/12/19at 08:09; Admin 


Dose 1 EA;  Start 2/11/19 at 21:00;  Status Hold


Famotidine (Pepcid) 20 mg Q24H PO  Last administered on 2/16/19at 21:23; Admin D


ose 20 MG;  Start 2/11/19 at 22:00


Insulin Aspart (Novolog Insulin Pen) (Adult SC Insulin - Mild Algorithm)... AC 


MEALS AND  BEDTIME SC  Last administered on 2/17/19at 11:53; Admin Dose 1 UNIT; 


Start 2/12/19 at 17:25


Miscellaneous Information Patients own medicat... BID@10,16 XX  Last 


administered on 2/15/19at 16:28; Admin Dose 1,600 EA;  Start 2/13/19 at 10:00


Furosemide (Lasix) 40 mg 0900,1300,1700 IV  Last administered on 2/17/19at 


08:06; Admin Dose 40 MG;  Start 2/13/19 at 17:00


Metolazone (Zaroxolyn) 2.5 mg BID PO  Last administered on 2/17/19at 08:06; 


Admin Dose 2.5 MG;  Start 2/13/19 at 17:15


Doxazosin Mesylate (Cardura) 4 mg HS PO  Last administered on 2/15/19at 21:14; 


Admin Dose 4 MG;  Start 2/14/19 at 21:00


Trazodone HCl (Desyrel) 50 mg HS PO ;  Start 2/16/19 at 00:30











GORDO MCDERMOTT              Feb 17, 2019 13:17

## 2019-02-17 NOTE — CONS
Assessment/Plan


Assessment/Plan


Hospital Course (Demo Recall)


1.  Shortness of breath with bilateral lower extremity edema, orthopnea and PND 


likely secondary to acute on chronic congestive heart failure.


2.  Acute on chronic renal failure; however, the patient's baseline creatinine 


3.  History of atrial fibrillation.


4.  Hypertension.


5.  Diabetes.


6.  Hyperlipidemia.


7.  BPH.


8.  Positive troponin with chest pain ? NSTEMI


9.  Elevated BNP


10 LEG EDEMA


PLAN DIURETIC





Consultation Date/Type/Reason


Admit Date/Time


Feb 11, 2019 at 13:13


Initial Consult Date





Type of Consult


renal


 SOB BETTER


Requesting Provider:  GORDO MCDERMOTT


Date/Time of Note


DATE: 2/17/19 


TIME: 18:18





Exam/Review of Systems


Exam


Vitals





Vital Signs


  Date      Temp  Pulse  Resp  B/P (MAP)   Pulse Ox  O2          O2 Flow    FiO2


Time                                                 Delivery    Rate


   2/17/19                                                             3.0


     18:17


   2/17/19           78


     16:00


   2/17/19  98.7           20  96/40 (58)        93


     15:05


   2/17/19                                           Nasal


     07:21                                           Cannula


   2/14/19                                                                    50


     13:23








Intake and Output





2/16/19 2/16/19 2/17/19





1515:00


23:00


07:00





IntakeIntake Total


800 ml


480 ml





OutputOutput Total


1700 ml


900 ml





BalanceBalance


-900 ml


-420 ml











Respiratory:  diminished breath sounds


Cardiovascular:  regular rate and rhythm


Gastrointestinal:  soft


Musculoskeletal:  nl extremities to inspection


Extremities:  normal pulses





Results


Result Diagram:  


2/17/19 0540                                                                    


           2/17/19 0540





Results 24hrs





Laboratory Tests


Test
                 2/16/19
20:11  2/17/19
02:01  2/17/19
05:40  2/17/19
08:03


Bedside Glucose               209           238  H                         164


White Blood Count                                           6.1


Red Blood Count                                           4.31  L


Hemoglobin                                                10.7  L


Hematocrit                                                36.3  L


Mean Corpuscular                                           84.2


Volume


Mean Corpuscular                                          24.8  L


Hemoglobin


Mean Corpuscular      
              
                   29.5  L
  



Hemoglobin
Concent


Red Cell                                                  19.5  H


Distribution Width


Platelet Count                                              158


Mean Platelet Volume                                      12.2  H


Immature                                                  0.200


Granulocytes %


Neutrophils %                                              66.0


Lymphocytes %                                              18.1


Monocytes %                                               11.7  H


Eosinophils %                                               3.0


Basophils %                                                 1.0


Nucleated Red Blood                                         0.0


Cells %


Immature                                                  0.010


Granulocytes #


Neutrophils #                                               4.0


Lymphocytes #                                               1.1


Monocytes #                                                 0.7


Eosinophils #                                               0.2


Basophils #                                                 0.1


Nucleated Red Blood                                         0.0


Cells #


Sodium Level                                                139


Potassium Level                                             4.5


Chloride Level                                              93  L


Carbon Dioxide Level                                        37  H


Anion Gap                                                     9


Blood Urea Nitrogen                                         72  H


Creatinine                                                2.41  H


Est Glomerular        
              
                     27  L
  



Filtrat Rate
mL/min


Glucose Level                                               205


Calcium Level                                               9.3


Test
                 2/17/19
11:48  2/17/19
17:31  
              



Bedside Glucose               179           306  H








Medications


Medication





Current Medications


IV Flush (NS 3 ml) 3 ml PER PROTOCOL IV ;  Start 2/11/19 at 14:00


Ondansetron HCl (Zofran Inj) 4 mg Q6H  PRN IV NAUSEA/VOMITING Last administered 


on 2/13/19at 04:47; Admin Dose 4 MG;  Start 2/11/19 at 14:00


Acetaminophen (Tylenol Tab) 650 mg Q6H  PRN PO .PAIN 1-3 OR TEMP Last 


administered on 2/13/19at 13:07; Admin Dose 650 MG;  Start 2/11/19 at 14:00


Acetaminophen/ Hydrocodone Bitart (Norco (5/325)) 1 tab Q6H  PRN PO .MOD PAIN 4-


6 Last administered on 2/12/19at 13:01; Admin Dose 1 TAB;  Start 2/11/19 at 


14:00


Morphine Sulfate (morphine) 6 mg Q4H  PRN PO .SEVERE PAIN 7-10;  Start 2/11/19 


at 14:00


Docusate Sodium (Colace) 100 mg Q12H  PRN PO .CONSTIPATION;  Start 2/11/19 at 


14:00


Magnesium Hydroxide (Milk Of Mag) 30 ml DAILY  PRN PO .CONSTIPATION Last 


administered on 2/12/19at 18:09; Admin Dose 30 ML;  Start 2/11/19 at 14:00


Lorazepam (Ativan) 0.5 mg Q6H  PRN PO ANXIETY;  Start 2/11/19 at 14:00


Albuterol/ Ipratropium (Duoneb) 3 ml Q4H RESP THERAPY  PRN HHN SHORTNESS OF 


BREATH Last administered on 2/14/19at 23:35; Admin Dose 3 ML;  Start 2/11/19 at 


14:00


Hydralazine HCl (Apresoline) 10 mg Q6H  PRN IV ELEVATED BLOOD PRESSURE;  Start 


2/11/19 at 14:00


Nitroglycerin (Nitroglycerin (Sl Tab) 0.4 Mg) 1 tab Q5M  PRN SL ANGINA;  Start 


2/11/19 at 14:00


Apixaban (Eliquis) 5 mg BID PO  Last administered on 2/17/19at 08:05; Admin Dose


5 MG;  Start 2/11/19 at 21:00


Digoxin (Digoxin) 0.125 mg DAILY@1300 PO  Last administered on 2/17/19at 13:00; 


Admin Dose 0.125 MG;  Start 2/12/19 at 13:00


Ergocalciferol (Drisdol) 50,000 unit Q7D PO  Last administered on 2/11/19at 


18:03; Admin Dose 50,000 UNIT;  Start 2/11/19 at 14:00


Ferrous Sulfate (Ferrous Sulfate (Ec)) 325 mg BID PO  Last administered on 


2/17/19at 08:05; Admin Dose 325 MG;  Start 2/11/19 at 21:00


Insulin Glargine (Lantus) 12 units QHS SC  Last administered on 2/16/19at 20:26;


Admin Dose 12 UNITS;  Start 2/11/19 at 21:00


Famotidine (Pepcid) 20 mg DAILY PO  Last administered on 2/17/19at 08:05; Admin 


Dose 20 MG;  Start 2/12/19 at 09:00


Atorvastatin Calcium (Lipitor) 80 mg HS PO  Last administered on 2/16/19at 


20:12; Admin Dose 80 MG;  Start 2/11/19 at 21:00


Diagnostic Test (Pha) (Accu-Chek) 1 ea 02 XX  Last administered on 2/17/19at 


02:29; Admin Dose 1 EA;  Start 2/12/19 at 02:00


Miscellaneous Information 1 ea NOTE XX ;  Start 2/11/19 at 14:30


Glucose (Glutose) 15 gm Q15M  PRN PO DECREASED GLUCOSE;  Start 2/11/19 at 14:30


Glucose (Glutose) 22.5 gm Q15M  PRN PO DECREASED GLUCOSE;  Start 2/11/19 at 


14:30


Dextrose (D50w Syringe) 25 ml Q15M  PRN IV DECREASED GLUCOSE;  Start 2/11/19 at 


14:30


Dextrose (D50w Syringe) 50 ml Q15M  PRN IV DECREASED GLUCOSE;  Start 2/11/19 at 


14:30


Glucagon (Glucagen) 1 mg Q15M  PRN IM DECREASED GLUCOSE;  Start 2/11/19 at 14:30


Glucose (Glutose) 15 gm Q15M  PRN BUCCAL DECREASED GLUCOSE Last administered on 


2/14/19at 08:36; Admin Dose 15 GM;  Start 2/11/19 at 14:30


Nicotine (Nicoderm 21 Mg/ 24hr) 1 patch DAILY TRANSDERM ;  Start 2/11/19 at 


16:30


Metoprolol Succinate (Toprol Xl) 25 mg DAILY PO  Last administered on 2/17/19at 


08:07; Admin Dose 25 MG;  Start 2/12/19 at 09:00


Patient Own Medication 1 ea BID PO  Last administered on 2/12/19at 08:09; Admin 


Dose 1 EA;  Start 2/11/19 at 21:00;  Status Hold


Famotidine (Pepcid) 20 mg Q24H PO  Last administered on 2/16/19at 21:23; Admin 


Dose 20 MG;  Start 2/11/19 at 22:00


Insulin Aspart (Novolog Insulin Pen) (Adult SC Insulin - Mild Algorithm)... AC 


MEALS AND  BEDTIME SC  Last administered on 2/17/19at 17:41; Admin Dose 5 UNIT; 


Start 2/12/19 at 17:25


Miscellaneous Information Patients own medicat... BID@10,16 XX  Last 


administered on 2/15/19at 16:28; Admin Dose 1,600 EA;  Start 2/13/19 at 10:00


Furosemide (Lasix) 40 mg 0900,1300,1700 IV  Last administered on 2/17/19at 


17:37; Admin Dose 40 MG;  Start 2/13/19 at 17:00


Metolazone (Zaroxolyn) 2.5 mg BID PO  Last administered on 2/17/19at 08:06; 


Admin Dose 2.5 MG;  Start 2/13/19 at 17:15


Doxazosin Mesylate (Cardura) 4 mg HS PO  Last administered on 2/15/19at 21:14; 


Admin Dose 4 MG;  Start 2/14/19 at 21:00


Trazodone HCl (Desyrel) 50 mg HS PO ;  Start 2/16/19 at 00:30











GAY AYON MD                Feb 17, 2019 18:20

## 2019-02-17 NOTE — CONS
Consult Date/Type/Reason


Admit Date/Time


Feb 11, 2019 at 13:13


Initial Consult Date





Requesting Provider:  GORDO MCDERMOTT


Date/Time of Note


DATE: 2/17/19 


TIME: 14:31





Subjective


NO acute events - pt stable - Cr at 2.41 - unchanged - improved fluid status. 





ROS: No fever, no chills, no nausea, no vomiting, no diarrhea/constipation


        No recent weight changes


        No chest pain, no PND, no orthopnea - improved SOB 


        No dizziness, blurred vision


        No thirst, no heat or cold intolerance





Objective


Vitals





Vital Signs


  Date      Temp  Pulse  Resp  B/P (MAP)   Pulse Ox  O2          O2 Flow    FiO2


Time                                                 Delivery    Rate


   2/17/19           62


     12:00


   2/17/19  97.9           20      114/56        97


     11:19                           (75)


   2/17/19                                           Nasal             4.0


     07:21                                           Cannula


   2/14/19                                                                    50


     13:23








Intake and Output





2/16/19 2/16/19 2/17/19





1515:00


23:00


07:00





IntakeIntake Total


800 ml


480 ml





OutputOutput Total


1700 ml


900 ml





BalanceBalance


-900 ml


-420 ml











Exam


General: WN/WD/NAD, AOx 3


HEENT: Unicetric/atraumatic/EOMI (follows commands)


NECK: JVD elevated, no thyromegaly


Lymph: no lymphadenopathy


HEART: regular with no S3, II/VI systolic murmur at apex, PMI L 


LUNGS: Coarse sounds


ABD: soft, NT, ND, +BS


: Intact


Neuro: non focal


SKIN: chronic changes


EXT: trace edema





Results/Medications


Result Diagram:  


2/17/19 0540                                                                    


           2/17/19 0540





Results 24 hrs





Laboratory Tests


Test
                 2/16/19
17:25  2/16/19
20:11  2/17/19
02:01  2/17/19
05:40


Bedside Glucose               143            209           238  H


White Blood Count                                                          6.1


Red Blood Count                                                          4.31  L


Hemoglobin                                                               10.7  L


Hematocrit                                                               36.3  L


Mean Corpuscular                                                          84.2


Volume


Mean Corpuscular                                                         24.8  L


Hemoglobin


Mean Corpuscular      
              
              
                   29.5  L



Hemoglobin
Concent


Red Cell                                                                 19.5  H


Distribution Width


Platelet Count                                                             158


Mean Platelet Volume                                                     12.2  H


Immature                                                                 0.200


Granulocytes %


Neutrophils %                                                             66.0


Lymphocytes %                                                             18.1


Monocytes %                                                              11.7  H


Eosinophils %                                                              3.0


Basophils %                                                                1.0


Nucleated Red Blood                                                        0.0


Cells %


Immature                                                                 0.010


Granulocytes #


Neutrophils #                                                              4.0


Lymphocytes #                                                              1.1


Monocytes #                                                                0.7


Eosinophils #                                                              0.2


Basophils #                                                                0.1


Nucleated Red Blood                                                        0.0


Cells #


Sodium Level                                                               139


Potassium Level                                                            4.5


Chloride Level                                                             93  L


Carbon Dioxide Level                                                       37  H


Anion Gap                                                                    9


Blood Urea Nitrogen                                                        72  H


Creatinine                                                               2.41  H


Est Glomerular        
              
              
                     27  L



Filtrat Rate
mL/min


Glucose Level                                                              205


Calcium Level                                                              9.3


Test
                 2/17/19
08:03  2/17/19
11:48  
              



Bedside Glucose               164            179





Home Meds


Reported Medications


Metoprolol Succinate* (Toprol XL*) 25 Mg Tab.sr.24h, 25 MG PO DAILY, #30 TAB


   2/11/19


Sacubitril/Valsartan (Entresto 97 mg-103 mg Tablet) 1 Each Tablet, 1 TAB PO BID


   12/7/18


Digoxin* (Digox*) 125 Mcg Tablet, 0.125 MG PO DAILY, TAB


   12/7/18


Apixaban* (Eliquis*) 5 Mg Tablet, 5 MG PO BID, TAB


   3/14/16


Insulin Aspart* (Novolog Insulin Vial*) 100 U/Ml Vial, 0 SC SLIDING SCALE AC, 


VIAL


   3/14/16


Insulin Glargine* (Lantus*) 100 Unit/Ml Soln, 12 UNIT SC QHS, #1 VIAL


   3/14/16


Esomeprazole Mag Trihydrate (Nexium) 40 Mg Capsule.dr, 40 MG PO DAILY, #30 CAP


   3/14/16


Furosemide* (Furosemide*) 40 Mg Tablet, 40 MG PO DAILY, TAB


   3/14/16


Amlodipine Besylate* (Norvasc*) 5 Mg Tablet, 5 MG PO DAILY, TAB


   9/15/15


Rosuvastatin Calcium* (Crestor*) 20 Mg Tablet, 20 MG PO HS, TAB


   9/15/15


Ergocalciferol* (Drisdol* (Vitamin D2)) 50,000 Unit Capsule, 48882 UNIT PO Q7D, 


CAP


    MONDAYS


   9/15/15


Doxazosin Mesylate* (Doxazosin Mesylate*) 4 Mg Tablet, 4 MG PO BID, TAB


   3/13/15


Ferrous Sulfate* (Ferrous Sulfate*) 325 Mg Tabec, 325 MG PO BID, TAB


   3/13/15


Discontinued Reported Medications


Metoprolol Succinate* (Toprol XL*) 50 Mg Tab.er.24h, 50 MG PO DAILY, #30 TAB


   3/14/16


Valsartan* (Diovan*) 160 Mg Tablet, 160 MG PO DAILY, TAB


   9/15/15


Discontinued Scripts


Levofloxacin* (Levaquin*) 750 Mg Tablet, 750 MG PO Q48H, #3 TAB


   Prov:ROSANNE HERNÁNDEZ MD         12/13/18


Medications





Current Medications


IV Flush (NS 3 ml) 3 ml PER PROTOCOL IV ;  Start 2/11/19 at 14:00


Ondansetron HCl (Zofran Inj) 4 mg Q6H  PRN IV NAUSEA/VOMITING Last administered 


on 2/13/19at 04:47; Admin Dose 4 MG;  Start 2/11/19 at 14:00


Acetaminophen (Tylenol Tab) 650 mg Q6H  PRN PO .PAIN 1-3 OR TEMP Last 


administered on 2/13/19at 13:07; Admin Dose 650 MG;  Start 2/11/19 at 14:00


Acetaminophen/ Hydrocodone Bitart (Norco (5/325)) 1 tab Q6H  PRN PO .MOD PAIN 4-


6 Last administered on 2/12/19at 13:01; Admin Dose 1 TAB;  Start 2/11/19 at 


14:00


Morphine Sulfate (morphine) 6 mg Q4H  PRN PO .SEVERE PAIN 7-10;  Start 2/11/19 


at 14:00


Docusate Sodium (Colace) 100 mg Q12H  PRN PO .CONSTIPATION;  Start 2/11/19 at 


14:00


Magnesium Hydroxide (Milk Of Mag) 30 ml DAILY  PRN PO .CONSTIPATION Last 


administered on 2/12/19at 18:09; Admin Dose 30 ML;  Start 2/11/19 at 14:00


Lorazepam (Ativan) 0.5 mg Q6H  PRN PO ANXIETY;  Start 2/11/19 at 14:00


Albuterol/ Ipratropium (Duoneb) 3 ml Q4H RESP THERAPY  PRN HHN SHORTNESS OF 


BREATH Last administered on 2/14/19at 23:35; Admin Dose 3 ML;  Start 2/11/19 at 


14:00


Hydralazine HCl (Apresoline) 10 mg Q6H  PRN IV ELEVATED BLOOD PRESSURE;  Start 


2/11/19 at 14:00


Nitroglycerin (Nitroglycerin (Sl Tab) 0.4 Mg) 1 tab Q5M  PRN SL ANGINA;  Start 


2/11/19 at 14:00


Apixaban (Eliquis) 5 mg BID PO  Last administered on 2/17/19at 08:05; Admin Dose


5 MG;  Start 2/11/19 at 21:00


Digoxin (Digoxin) 0.125 mg DAILY@1300 PO  Last administered on 2/17/19at 13:00; 


Admin Dose 0.125 MG;  Start 2/12/19 at 13:00


Ergocalciferol (Drisdol) 50,000 unit Q7D PO  Last administered on 2/11/19at 


18:03; Admin Dose 50,000 UNIT;  Start 2/11/19 at 14:00


Ferrous Sulfate (Ferrous Sulfate (Ec)) 325 mg BID PO  Last administered on 2/17 /19at 08:05; Admin Dose 325 MG;  Start 2/11/19 at 21:00


Insulin Glargine (Lantus) 12 units QHS SC  Last administered on 2/16/19at 20:26;


Admin Dose 12 UNITS;  Start 2/11/19 at 21:00


Famotidine (Pepcid) 20 mg DAILY PO  Last administered on 2/17/19at 08:05; Admin 


Dose 20 MG;  Start 2/12/19 at 09:00


Atorvastatin Calcium (Lipitor) 80 mg HS PO  Last administered on 2/16/19at 


20:12; Admin Dose 80 MG;  Start 2/11/19 at 21:00


Diagnostic Test (Pha) (Accu-Chek) 1 ea 02 XX  Last administered on 2/17/19at 0


2:29; Admin Dose 1 EA;  Start 2/12/19 at 02:00


Miscellaneous Information 1 ea NOTE XX ;  Start 2/11/19 at 14:30


Glucose (Glutose) 15 gm Q15M  PRN PO DECREASED GLUCOSE;  Start 2/11/19 at 14:30


Glucose (Glutose) 22.5 gm Q15M  PRN PO DECREASED GLUCOSE;  Start 2/11/19 at 


14:30


Dextrose (D50w Syringe) 25 ml Q15M  PRN IV DECREASED GLUCOSE;  Start 2/11/19 at 


14:30


Dextrose (D50w Syringe) 50 ml Q15M  PRN IV DECREASED GLUCOSE;  Start 2/11/19 at 


14:30


Glucagon (Glucagen) 1 mg Q15M  PRN IM DECREASED GLUCOSE;  Start 2/11/19 at 14:30


Glucose (Glutose) 15 gm Q15M  PRN BUCCAL DECREASED GLUCOSE Last administered on 


2/14/19at 08:36; Admin Dose 15 GM;  Start 2/11/19 at 14:30


Nicotine (Nicoderm 21 Mg/ 24hr) 1 patch DAILY TRANSDERM ;  Start 2/11/19 at 


16:30


Metoprolol Succinate (Toprol Xl) 25 mg DAILY PO  Last administered on 2/17/19at 


08:07; Admin Dose 25 MG;  Start 2/12/19 at 09:00


Patient Own Medication 1 ea BID PO  Last administered on 2/12/19at 08:09; Admin 


Dose 1 EA;  Start 2/11/19 at 21:00;  Status Hold


Famotidine (Pepcid) 20 mg Q24H PO  Last administered on 2/16/19at 21:23; Admin 


Dose 20 MG;  Start 2/11/19 at 22:00


Insulin Aspart (Novolog Insulin Pen) (Adult SC Insulin - Mild Algorithm)... AC 


MEALS AND  BEDTIME SC  Last administered on 2/17/19at 11:53; Admin Dose 1 UNIT; 


Start 2/12/19 at 17:25


Miscellaneous Information Patients own medicat... BID@10,16 XX  Last 


administered on 2/15/19at 16:28; Admin Dose 1,600 EA;  Start 2/13/19 at 10:00


Furosemide (Lasix) 40 mg 0900,1300,1700 IV  Last administered on 2/17/19at 


13:16; Admin Dose 40 MG;  Start 2/13/19 at 17:00


Metolazone (Zaroxolyn) 2.5 mg BID PO  Last administered on 2/17/19at 08:06; 


Admin Dose 2.5 MG;  Start 2/13/19 at 17:15


Doxazosin Mesylate (Cardura) 4 mg HS PO  Last administered on 2/15/19at 21:14; 


Admin Dose 4 MG;  Start 2/14/19 at 21:00


Trazodone HCl (Desyrel) 50 mg HS PO ;  Start 2/16/19 at 00:30





Assessment/Plan


Hospital Course (Demo Recall)


1.CHF- systolic acute on chronic - responded wel to diuresis - con't Rx and 


afterload reduction. Better now - will try to transition to po tomorrow. 


2.Cardiomyopathy- EF 30% - ICD in place. 


3.CKD - Cr at 2.4 - close to baseline - with diuresis. Stable x 2 days. 


4.HTN - well Rx now. WEll Rx. 


5.Dyslipidemia


6. Hepatomegaly-likley due to passive congestion of liver - primary follows.











DIO HARPER MD                 Feb 17, 2019 14:33

## 2019-02-18 VITALS — RESPIRATION RATE: 22 BRPM | SYSTOLIC BLOOD PRESSURE: 88 MMHG | DIASTOLIC BLOOD PRESSURE: 58 MMHG

## 2019-02-18 VITALS — RESPIRATION RATE: 20 BRPM | HEART RATE: 57 BPM | DIASTOLIC BLOOD PRESSURE: 57 MMHG | SYSTOLIC BLOOD PRESSURE: 109 MMHG

## 2019-02-18 VITALS — SYSTOLIC BLOOD PRESSURE: 112 MMHG | HEART RATE: 56 BPM | RESPIRATION RATE: 22 BRPM | DIASTOLIC BLOOD PRESSURE: 56 MMHG

## 2019-02-18 VITALS — HEART RATE: 53 BPM

## 2019-02-18 VITALS — HEART RATE: 61 BPM | RESPIRATION RATE: 17 BRPM | DIASTOLIC BLOOD PRESSURE: 58 MMHG | SYSTOLIC BLOOD PRESSURE: 112 MMHG

## 2019-02-18 VITALS — DIASTOLIC BLOOD PRESSURE: 57 MMHG | HEART RATE: 60 BPM | SYSTOLIC BLOOD PRESSURE: 98 MMHG | RESPIRATION RATE: 18 BRPM

## 2019-02-18 VITALS — DIASTOLIC BLOOD PRESSURE: 57 MMHG | SYSTOLIC BLOOD PRESSURE: 94 MMHG | RESPIRATION RATE: 19 BRPM | HEART RATE: 62 BPM

## 2019-02-18 VITALS — SYSTOLIC BLOOD PRESSURE: 92 MMHG | DIASTOLIC BLOOD PRESSURE: 55 MMHG | RESPIRATION RATE: 18 BRPM | HEART RATE: 55 BPM

## 2019-02-18 VITALS — SYSTOLIC BLOOD PRESSURE: 95 MMHG | DIASTOLIC BLOOD PRESSURE: 53 MMHG

## 2019-02-18 VITALS — HEART RATE: 48 BPM

## 2019-02-18 VITALS — HEART RATE: 44 BPM

## 2019-02-18 VITALS — SYSTOLIC BLOOD PRESSURE: 113 MMHG | DIASTOLIC BLOOD PRESSURE: 65 MMHG

## 2019-02-18 LAB
ABNORMAL IP MESSAGE: 1
ADD MAN DIFF?: NO
ANION GAP: 7 (ref 5–13)
BASOPHIL #: 0.1 10^3/UL (ref 0–0.1)
BASOPHILS %: 0.7 % (ref 0–2)
BLOOD UREA NITROGEN: 74 MG/DL (ref 7–20)
CALCIUM: 9.4 MG/DL (ref 8.4–10.2)
CARBON DIOXIDE: 41 MMOL/L (ref 21–31)
CHLORIDE: 92 MMOL/L (ref 97–110)
CREATININE: 2.23 MG/DL (ref 0.61–1.24)
EOSINOPHILS #: 0.2 10^3/UL (ref 0–0.5)
EOSINOPHILS %: 3.4 % (ref 0–7)
GLUCOSE: 177 MG/DL (ref 70–220)
HEMATOCRIT: 34.6 % (ref 42–52)
HEMOGLOBIN: 10.8 G/DL (ref 14–18)
IMMATURE GRANS #M: 0.02 10^3/UL (ref 0–0.03)
IMMATURE GRANS % (M): 0.3 % (ref 0–0.43)
LYMPHOCYTES #: 1.3 10^3/UL (ref 0.8–2.9)
LYMPHOCYTES %: 18.9 % (ref 15–51)
MEAN CORPUSCULAR HEMOGLOBIN: 25.1 PG (ref 29–33)
MEAN CORPUSCULAR HGB CONC: 31.2 G/DL (ref 32–37)
MEAN CORPUSCULAR VOLUME: 80.3 FL (ref 82–101)
MEAN PLATELET VOLUME: 12.7 FL (ref 7.4–10.4)
MONOCYTE #: 0.7 10^3/UL (ref 0.3–0.9)
MONOCYTES %: 10.5 % (ref 0–11)
NEUTROPHIL #: 4.7 10^3/UL (ref 1.6–7.5)
NEUTROPHILS %: 66.2 % (ref 39–77)
NUCLEATED RED BLOOD CELLS #: 0 10^3/UL (ref 0–0)
NUCLEATED RED BLOOD CELLS%: 0 /100WBC (ref 0–0)
PLATELET COUNT: 166 10^3/UL (ref 140–415)
POSITIVE DIFF: (no result)
POTASSIUM: 4.2 MMOL/L (ref 3.5–5.1)
RED BLOOD COUNT: 4.31 10^6/UL (ref 4.7–6.1)
RED CELL DISTRIBUTION WIDTH: 18.1 % (ref 11.5–14.5)
SODIUM: 140 MMOL/L (ref 135–144)
WHITE BLOOD COUNT: 7.1 10^3/UL (ref 4.8–10.8)

## 2019-02-18 RX ADMIN — APIXABAN SCH MG: 5 TABLET, FILM COATED ORAL at 08:53

## 2019-02-18 RX ADMIN — FAMOTIDINE 1 MG: 20 TABLET ORAL at 22:09

## 2019-02-18 RX ADMIN — FERROUS SULFATE TAB 325 MG (65 MG ELEMENTAL FE) SCH MG: 325 (65 FE) TAB at 20:06

## 2019-02-18 RX ADMIN — DIGOXIN SCH MG: 125 TABLET ORAL at 12:40

## 2019-02-18 RX ADMIN — ACETAZOLAMIDE SODIUM 1 MG: 500 INJECTION, POWDER, LYOPHILIZED, FOR SOLUTION INTRAVENOUS at 08:54

## 2019-02-18 RX ADMIN — METOLAZONE 1 MG: 2.5 TABLET ORAL at 08:52

## 2019-02-18 RX ADMIN — DOXAZOSIN SCH MG: 4 TABLET ORAL at 20:06

## 2019-02-18 RX ADMIN — ACETAZOLAMIDE SODIUM SCH MG: 500 INJECTION, POWDER, LYOPHILIZED, FOR SOLUTION INTRAVENOUS at 08:54

## 2019-02-18 RX ADMIN — INSULIN ASPART 1 UNIT: 100 INJECTION, SOLUTION INTRAVENOUS; SUBCUTANEOUS at 17:48

## 2019-02-18 RX ADMIN — FERROUS SULFATE TAB 325 MG (65 MG ELEMENTAL FE) SCH MG: 325 (65 FE) TAB at 08:52

## 2019-02-18 RX ADMIN — INSULIN GLARGINE 1 UNITS: 100 INJECTION, SOLUTION SUBCUTANEOUS at 21:25

## 2019-02-18 RX ADMIN — FUROSEMIDE 1 MG: 40 TABLET ORAL at 17:44

## 2019-02-18 RX ADMIN — METOPROLOL SUCCINATE SCH MG: 25 TABLET, EXTENDED RELEASE ORAL at 08:53

## 2019-02-18 RX ADMIN — INSULIN ASPART 1 UNIT: 100 INJECTION, SOLUTION INTRAVENOUS; SUBCUTANEOUS at 12:45

## 2019-02-18 RX ADMIN — DOXAZOSIN 1 MG: 4 TABLET ORAL at 20:06

## 2019-02-18 RX ADMIN — NICOTINE 1 PATCH: 21 PATCH, EXTENDED RELEASE TRANSDERMAL at 08:54

## 2019-02-18 RX ADMIN — INSULIN GLARGINE SCH UNITS: 100 INJECTION, SOLUTION SUBCUTANEOUS at 21:25

## 2019-02-18 RX ADMIN — METOPROLOL SUCCINATE 1 MG: 25 TABLET, EXTENDED RELEASE ORAL at 08:53

## 2019-02-18 RX ADMIN — FERROUS SULFATE TAB 325 MG (65 MG ELEMENTAL FE) 1 MG: 325 (65 FE) TAB at 08:52

## 2019-02-18 RX ADMIN — ATORVASTATIN CALCIUM SCH MG: 80 TABLET, FILM COATED ORAL at 20:06

## 2019-02-18 RX ADMIN — DIGOXIN 1 MG: 125 TABLET ORAL at 12:40

## 2019-02-18 RX ADMIN — APIXABAN 1 MG: 5 TABLET, FILM COATED ORAL at 20:06

## 2019-02-18 RX ADMIN — FAMOTIDINE 1 MG: 20 TABLET ORAL at 08:53

## 2019-02-18 RX ADMIN — APIXABAN SCH MG: 5 TABLET, FILM COATED ORAL at 20:06

## 2019-02-18 RX ADMIN — ERGOCALCIFEROL SCH UNIT: 1.25 CAPSULE ORAL at 13:00

## 2019-02-18 RX ADMIN — FERROUS SULFATE TAB 325 MG (65 MG ELEMENTAL FE) 1 MG: 325 (65 FE) TAB at 20:06

## 2019-02-18 RX ADMIN — NICOTINE SCH PATCH: 21 PATCH, EXTENDED RELEASE TRANSDERMAL at 08:54

## 2019-02-18 RX ADMIN — MAGNESIUM HYDROXIDE 1 ML: 400 SUSPENSION ORAL at 20:06

## 2019-02-18 RX ADMIN — APIXABAN 1 MG: 5 TABLET, FILM COATED ORAL at 08:53

## 2019-02-18 RX ADMIN — INSULIN ASPART 1 UNIT: 100 INJECTION, SOLUTION INTRAVENOUS; SUBCUTANEOUS at 21:26

## 2019-02-18 RX ADMIN — ATORVASTATIN CALCIUM 1 MG: 80 TABLET, FILM COATED ORAL at 20:06

## 2019-02-18 RX ADMIN — FUROSEMIDE 1 MG: 10 INJECTION, SOLUTION INTRAVENOUS at 08:53

## 2019-02-18 RX ADMIN — INSULIN ASPART 1 UNIT: 100 INJECTION, SOLUTION INTRAVENOUS; SUBCUTANEOUS at 07:25

## 2019-02-18 RX ADMIN — FAMOTIDINE SCH MG: 20 TABLET ORAL at 08:53

## 2019-02-18 RX ADMIN — DOCUSATE SODIUM 1 MG: 100 CAPSULE, LIQUID FILLED ORAL at 20:37

## 2019-02-18 RX ADMIN — FUROSEMIDE SCH MG: 40 TABLET ORAL at 17:44

## 2019-02-18 RX ADMIN — FAMOTIDINE SCH MG: 20 TABLET ORAL at 22:09

## 2019-02-18 RX ADMIN — ERGOCALCIFEROL 1 UNIT: 1.25 CAPSULE ORAL at 13:00

## 2019-02-18 NOTE — PN
Date/Time of Note


Date/Time of Note


DATE: 2/18/19 


TIME: 12:21





Assessment/Plan


VTE Prophylaxis


Risk score (from [facility])>0 risk:  10


SCD applied (from [facility]):  No


SCD contraindicated:  low risk/ambulating


Pharmacological prophylaxis:  NA/contraindicated


Pharm contraindication:  low risk/ambulating





Lines/Catheters


[facility]  Saline Lock


Urinary Cath still in place:  No





Assessment/Plan


Hospital Course


  A 68-year-old male presenting with:


1.  Shortness of breath with bilateral lower extremity edema, orthopnea and PND 


likely secondary to acute on chronic congestive heart failure.


2.  Acute on chronic renal failure; however, the patient's baseline creatinine 


ranges from 1.9 to 2.  On last discharge in December, the creatinine was 1.9.  


This could be all secondary to cardiorenal as the patient is clearly in 


decompensated heart failure right now.  now reached a euvolemic state, pt had 


Renal U/S in 12/18 which showed chronci kidney disease. Cr 2.23 today 


3.  History of atrial fibrillation.


4.  Hypertension.


5.  Diabetes.


6.  Hyperlipidemia.


7.  BPH.


8.  Positive troponin with chest pain ? NSTEMI


9.  Elevated BNP.


10  Contraction alkalosis with worsening uremia





 


PLAN:


- neg 1 L


-change lasix to 40 bid


- dc metalozone


- diamox iv 


- cw  hold off Entresto at this point.


-  Digoxin levels normal.


-. Keep a systolic blood pressure of greater than 100.


-   Renally dose all meds.


-   Avoid nephrotoxic agents.





spoke to patient at bedside


Result Diagram:  


2/18/19 0549                                                                    


           2/18/19 0549





Results 24hrs





Laboratory Tests


Test
                 2/17/19
17:31  2/17/19
20:31  2/18/19
05:49  2/18/19
08:17


Bedside Glucose              306  H          142                           129


White Blood Count                                           7.1


Red Blood Count                                           4.31  L


Hemoglobin                                                10.8  L


Hematocrit                                                34.6  L


Mean Corpuscular                                          80.3  L


Volume


Mean Corpuscular                                          25.1  L


Hemoglobin


Mean Corpuscular      
              
                   31.2  L
  



Hemoglobin
Concent


Red Cell                                                  18.1  H


Distribution Width


Platelet Count                                              166


Mean Platelet Volume                                      12.7  H


Immature                                                  0.300


Granulocytes %


Neutrophils %                                              66.2


Lymphocytes %                                              18.9


Monocytes %                                                10.5


Eosinophils %                                               3.4


Basophils %                                                 0.7


Nucleated Red Blood                                         0.0


Cells %


Immature                                                  0.020


Granulocytes #


Neutrophils #                                               4.7


Lymphocytes #                                               1.3


Monocytes #                                                 0.7


Eosinophils #                                               0.2


Basophils #                                                 0.1


Nucleated Red Blood                                         0.0


Cells #


Sodium Level                                                140


Potassium Level                                             4.2


Chloride Level                                              92  L


Carbon Dioxide Level                                       41  *H


Anion Gap                                                     7


Blood Urea Nitrogen                                         74  H


Creatinine                                                2.23  H


Est Glomerular        
              
                     29  L
  



Filtrat Rate
mL/min


Glucose Level                                               177


Calcium Level                                               9.4








Subjective


24 Hr Interval Summary


Free Text/Dictation


Feeling better


Lower extremity edema is improved


Bicarb 41





Exam/Review of Systems


Exam


Vitals





Vital Signs


  Date      Temp  Pulse  Resp  B/P (MAP)   Pulse Ox  O2          O2 Flow    FiO2


Time                                                 Delivery    Rate


   2/18/19  98.0     57    20      109/57       100                    3.0


     11:38                           (74)


   2/18/19                                           Nasal


     07:25                                           Cannula


   2/18/19                                                                    31


     04:29








Intake and Output





2/17/19 2/17/19 2/18/19





1515:00


23:00


07:00





IntakeIntake Total


650 ml


100 ml





OutputOutput Total


1200 ml





BalanceBalance


-550 ml


100 ml











Exam


Respiratory:  diminished breath sounds


Cardiovascular:  regular rate and rhythm


Gastrointestinal:  soft


Musculoskeletal:  nl extremities to inspection


Extremities:  normal pulses


+ Edema improved





Results


Results 24hrs





Laboratory Tests


Test
                 2/17/19
17:31  2/17/19
20:31  2/18/19
05:49  2/18/19
08:17


Bedside Glucose              306  H          142                           129


White Blood Count                                           7.1


Red Blood Count                                           4.31  L


Hemoglobin                                                10.8  L


Hematocrit                                                34.6  L


Mean Corpuscular                                          80.3  L


Volume


Mean Corpuscular                                          25.1  L


Hemoglobin


Mean Corpuscular      
              
                   31.2  L
  



Hemoglobin
Concent


Red Cell                                                  18.1  H


Distribution Width


Platelet Count                                              166


Mean Platelet Volume                                      12.7  H


Immature                                                  0.300


Granulocytes %


Neutrophils %                                              66.2


Lymphocytes %                                              18.9


Monocytes %                                                10.5


Eosinophils %                                               3.4


Basophils %                                                 0.7


Nucleated Red Blood                                         0.0


Cells %


Immature                                                  0.020


Granulocytes #


Neutrophils #                                               4.7


Lymphocytes #                                               1.3


Monocytes #                                                 0.7


Eosinophils #                                               0.2


Basophils #                                                 0.1


Nucleated Red Blood                                         0.0


Cells #


Sodium Level                                                140


Potassium Level                                             4.2


Chloride Level                                              92  L


Carbon Dioxide Level                                       41  *H


Anion Gap                                                     7


Blood Urea Nitrogen                                         74  H


Creatinine                                                2.23  H


Est Glomerular        
              
                     29  L
  



Filtrat Rate
mL/min


Glucose Level                                               177


Calcium Level                                               9.4








Medications


Medication





Current Medications


IV Flush (NS 3 ml) 3 ml PER PROTOCOL IV ;  Start 2/11/19 at 14:00


Ondansetron HCl (Zofran Inj) 4 mg Q6H  PRN IV NAUSEA/VOMITING Last administered 


on 2/13/19at 04:47; Admin Dose 4 MG;  Start 2/11/19 at 14:00


Acetaminophen (Tylenol Tab) 650 mg Q6H  PRN PO .PAIN 1-3 OR TEMP Last 


administered on 2/13/19at 13:07; Admin Dose 650 MG;  Start 2/11/19 at 14:00


Acetaminophen/ Hydrocodone Bitart (Norco (5/325)) 1 tab Q6H  PRN PO .MOD PAIN 4-


6 Last administered on 2/12/19at 13:01; Admin Dose 1 TAB;  Start 2/11/19 at 


14:00


Morphine Sulfate (morphine) 6 mg Q4H  PRN PO .SEVERE PAIN 7-10;  Start 2/11/19 


at 14:00


Docusate Sodium (Colace) 100 mg Q12H  PRN PO .CONSTIPATION;  Start 2/11/19 at 


14:00


Magnesium Hydroxide (Milk Of Mag) 30 ml DAILY  PRN PO .CONSTIPATION Last 


administered on 2/12/19at 18:09; Admin Dose 30 ML;  Start 2/11/19 at 14:00


Lorazepam (Ativan) 0.5 mg Q6H  PRN PO ANXIETY;  Start 2/11/19 at 14:00


Albuterol/ Ipratropium (Duoneb) 3 ml Q4H RESP THERAPY  PRN HHN SHORTNESS OF 


BREATH Last administered on 2/14/19at 23:35; Admin Dose 3 ML;  Start 2/11/19 at 


14:00


Hydralazine HCl (Apresoline) 10 mg Q6H  PRN IV ELEVATED BLOOD PRESSURE;  Start 


2/11/19 at 14:00


Nitroglycerin (Nitroglycerin (Sl Tab) 0.4 Mg) 1 tab Q5M  PRN SL ANGINA;  Start 


2/11/19 at 14:00


Apixaban (Eliquis) 5 mg BID PO  Last administered on 2/18/19at 08:53; Admin Dose


5 MG;  Start 2/11/19 at 21:00


Digoxin (Digoxin) 0.125 mg DAILY@1300 PO  Last administered on 2/17/19at 13:00; 


Admin Dose 0.125 MG;  Start 2/12/19 at 13:00


Ergocalciferol (Drisdol) 50,000 unit Q7D PO  Last administered on 2/11/19at 1


8:03; Admin Dose 50,000 UNIT;  Start 2/11/19 at 14:00


Ferrous Sulfate (Ferrous Sulfate (Ec)) 325 mg BID PO  Last administered on 


2/18/19at 08:52; Admin Dose 325 MG;  Start 2/11/19 at 21:00


Insulin Glargine (Lantus) 12 units QHS SC  Last administered on 2/17/19at 20:40;


Admin Dose 12 UNITS;  Start 2/11/19 at 21:00


Famotidine (Pepcid) 20 mg DAILY PO  Last administered on 2/18/19at 08:53; Admin 


Dose 20 MG;  Start 2/12/19 at 09:00


Atorvastatin Calcium (Lipitor) 80 mg HS PO  Last administered on 2/17/19at 


20:34; Admin Dose 80 MG;  Start 2/11/19 at 21:00


Diagnostic Test (Pha) (Accu-Chek) 1 ea 02 XX  Last administered on 2/17/19at 


02:29; Admin Dose 1 EA;  Start 2/12/19 at 02:00


Miscellaneous Information 1 ea NOTE XX ;  Start 2/11/19 at 14:30


Glucose (Glutose) 15 gm Q15M  PRN PO DECREASED GLUCOSE;  Start 2/11/19 at 14:30


Glucose (Glutose) 22.5 gm Q15M  PRN PO DECREASED GLUCOSE;  Start 2/11/19 at 


14:30


Dextrose (D50w Syringe) 25 ml Q15M  PRN IV DECREASED GLUCOSE;  Start 2/11/19 at 


14:30


Dextrose (D50w Syringe) 50 ml Q15M  PRN IV DECREASED GLUCOSE;  Start 2/11/19 at 


14:30


Glucagon (Glucagen) 1 mg Q15M  PRN IM DECREASED GLUCOSE;  Start 2/11/19 at 14:30


Glucose (Glutose) 15 gm Q15M  PRN BUCCAL DECREASED GLUCOSE Last administered on 


2/14/19at 08:36; Admin Dose 15 GM;  Start 2/11/19 at 14:30


Nicotine (Nicoderm 21 Mg/ 24hr) 1 patch DAILY TRANSDERM ;  Start 2/11/19 at 


16:30


Metoprolol Succinate (Toprol Xl) 25 mg DAILY PO  Last administered on 2/18/19at 


08:53; Admin Dose 25 MG;  Start 2/12/19 at 09:00


Patient Own Medication 1 ea BID PO  Last administered on 2/12/19at 08:09; Admin 


Dose 1 EA;  Start 2/11/19 at 21:00;  Status Hold


Famotidine (Pepcid) 20 mg Q24H PO  Last administered on 2/17/19at 21:06; Admin 


Dose 20 MG;  Start 2/11/19 at 22:00


Insulin Aspart (Novolog Insulin Pen) (Adult SC Insulin - Mild Algorithm)... AC 


MEALS AND  BEDTIME SC  Last administered on 2/17/19at 17:41; Admin Dose 5 UNIT; 


Start 2/12/19 at 17:25


Miscellaneous Information Patients own medicat... BID@10,16 XX  Last 


administered on 2/15/19at 16:28; Admin Dose 1,600 EA;  Start 2/13/19 at 10:00


Metolazone (Zaroxolyn) 2.5 mg BID PO  Last administered on 2/18/19at 08:52; 


Admin Dose 2.5 MG;  Start 2/13/19 at 17:15


Doxazosin Mesylate (Cardura) 4 mg HS PO  Last administered on 2/15/19at 21:14; 


Admin Dose 4 MG;  Start 2/14/19 at 21:00


Trazodone HCl (Desyrel) 50 mg HS PO ;  Start 2/16/19 at 00:30


Acetazolamide (Diamox) 500 mg DAILY IV  Last administered on 2/18/19at 08:54; 


Admin Dose 500 MG;  Start 2/18/19 at 09:00;  Stop 2/23/19 at 08:59


Furosemide (Lasix) 40 mg BID  DIURETICS PO ;  Start 2/18/19 at 18:00











JOSE YANG MD              Feb 18, 2019 12:24

## 2019-02-18 NOTE — PN
Date/Time of Note


Date/Time of Note


DATE: 2/18/19 


TIME: 15:40





Assessment/Plan


VTE Prophylaxis


Risk score (from Ns)>0 risk:  10


SCD applied (from Ns):  No


SCD contraindicated:  other (not contraindicated)


Pharmacological prophylaxis:  apixaban





Lines/Catheters


IV Catheter Type (from [facility]):  Saline Lock


Urinary Cath still in place:  No





Assessment/Plan


Assessment/Plan


68-year-old man coming in with chest pressure, shortness of breath, lower 


extremity swelling, signs of congestive heart failure exacerbation, also rule 


out for acute coronary syndrome.





1.  Shortness of breath and chest pressure 


   -overall slowly improving now.


   -Continue fluid restriction as recommended by cardiology and renal teams, 


apparently for now still continue 3 times a day IV Lasix, lower dose metolazone,


and low-dose beta-blocker


   -Continue to keep the head of the bed greater than 30 degrees


   - follow up Cardiology recommendations and continue digoxin for now.


   -Off of Entresto presently while in-house


2. Acute on chronic kidney disease.  Again, the kidney function appears to be sl


ightly worse than when he was here 2 months ago.  Creatinine levels have been in


the low to mid 2 range.  Patient having adequate urine output.


   -Follow-up recommendations from renal consult


   -Continue to carefully monitor BUN and creatinine levels and urine output. 


3.  Smoking history.  


   -Counseled on cessation. 


   -Continue nicotine patch.


4.  History of hypertension.  Again, see above.  


   - Continue current medications.  


   - Also hydralazine p.r.n.


5.  History of atrial fibrillation -appears stable


   -Monitor, continue Eliquis as well.


6.  Type 2 diabetes.  A1c equals 9.0, sugars presently stable 


   - continue Lantus and sliding scale insulin.


7.  Deep venous thrombosis prophylaxis-on Eliquis.


Result Diagram:  


2/18/19 0549                                                                    


           2/18/19 0549








Subjective


24 Hr Interval Summary


Free Text/Dictation


Patient feeling well. Lying flat in bed on oxygen nasal cannula. 


No complaints.





Exam/Review of Systems


Exam


Vitals





Vital Signs


  Date      Temp  Pulse  Resp  B/P (MAP)   Pulse Ox  O2          O2 Flow    FiO2


Time                                                 Delivery    Rate


   2/18/19           44


     12:00


   2/18/19  98.0           20      109/57       100                    3.0


     11:38                           (74)


   2/18/19                                           Nasal


     07:25                                           Cannula


   2/18/19                                                                    31


     04:29








Intake and Output





2/17/19 2/17/19 2/18/19





1414:59


22:59


06:59





IntakeIntake Total


650 ml


100 ml





OutputOutput Total


1200 ml





BalanceBalance


-550 ml


100 ml











Exam





GENERAL:  lying in bed, in no acute distress.


HEENT:  Pupils equal, round and reactive to light.  Extraocular muscles are 


intact.


NECK:  Supple, no thyromegaly.


LUNGS:  Distant breath sounds bilaterally, no crackles appreciated. 


CARDIOVASCULAR:  Irregularly irregular heart rate.  No rubs or gallops.


ABDOMEN:  Soft, nontender and nondistended.  Normal bowel sounds.  No rebound or


guarding.


MUSCULOSKELETAL:  1+ pitting edema in bilateral lower extremities to the mid 


calves.





Medications


Medication





Current Medications


IV Flush (NS 3 ml) 3 ml PER PROTOCOL IV ;  Start 2/11/19 at 14:00


Ondansetron HCl (Zofran Inj) 4 mg Q6H  PRN IV NAUSEA/VOMITING Last administered 


on 2/13/19at 04:47; Admin Dose 4 MG;  Start 2/11/19 at 14:00


Acetaminophen (Tylenol Tab) 650 mg Q6H  PRN PO .PAIN 1-3 OR TEMP Last 


administered on 2/13/19at 13:07; Admin Dose 650 MG;  Start 2/11/19 at 14:00


Acetaminophen/ Hydrocodone Bitart (Norco (5/325)) 1 tab Q6H  PRN PO .MOD PAIN 4-


6 Last administered on 2/12/19at 13:01; Admin Dose 1 TAB;  Start 2/11/19 at 


14:00


Morphine Sulfate (morphine) 6 mg Q4H  PRN PO .SEVERE PAIN 7-10;  Start 2/11/19 


at 14:00


Docusate Sodium (Colace) 100 mg Q12H  PRN PO .CONSTIPATION;  Start 2/11/19 at 


14:00


Magnesium Hydroxide (Milk Of Mag) 30 ml DAILY  PRN PO .CONSTIPATION Last 


administered on 2/12/19at 18:09; Admin Dose 30 ML;  Start 2/11/19 at 14:00


Lorazepam (Ativan) 0.5 mg Q6H  PRN PO ANXIETY;  Start 2/11/19 at 14:00


Albuterol/ Ipratropium (Duoneb) 3 ml Q4H RESP THERAPY  PRN HHN SHORTNESS OF 


BREATH Last administered on 2/14/19at 23:35; Admin Dose 3 ML;  Start 2/11/19 at 


14:00


Hydralazine HCl (Apresoline) 10 mg Q6H  PRN IV ELEVATED BLOOD PRESSURE;  Start 


2/11/19 at 14:00


Nitroglycerin (Nitroglycerin (Sl Tab) 0.4 Mg) 1 tab Q5M  PRN SL ANGINA;  Start 


2/11/19 at 14:00


Apixaban (Eliquis) 5 mg BID PO  Last administered on 2/18/19at 08:53; Admin Dose


5 MG;  Start 2/11/19 at 21:00


Digoxin (Digoxin) 0.125 mg DAILY@1300 PO  Last administered on 2/18/19at 12:40; 


Admin Dose 0.125 MG;  Start 2/12/19 at 13:00


Ergocalciferol (Drisdol) 50,000 unit Q7D PO  Last administered on 2/18/19at 


13:00; Admin Dose 50,000 UNIT;  Start 2/11/19 at 14:00


Ferrous Sulfate (Ferrous Sulfate (Ec)) 325 mg BID PO  Last administered on 


2/18/19at 08:52; Admin Dose 325 MG;  Start 2/11/19 at 21:00


Insulin Glargine (Lantus) 12 units QHS SC  Last administered on 2/17/19at 20:40;


Admin Dose 12 UNITS;  Start 2/11/19 at 21:00


Famotidine (Pepcid) 20 mg DAILY PO  Last administered on 2/18/19at 08:53; Admin 


Dose 20 MG;  Start 2/12/19 at 09:00


Atorvastatin Calcium (Lipitor) 80 mg HS PO  Last administered on 2/17/19at 


20:34; Admin Dose 80 MG;  Start 2/11/19 at 21:00


Diagnostic Test (Pha) (Accu-Chek) 1 ea 02 XX  Last administered on 2/17/19at 


02:29; Admin Dose 1 EA;  Start 2/12/19 at 02:00


Miscellaneous Information 1 ea NOTE XX ;  Start 2/11/19 at 14:30


Glucose (Glutose) 15 gm Q15M  PRN PO DECREASED GLUCOSE;  Start 2/11/19 at 14:30


Glucose (Glutose) 22.5 gm Q15M  PRN PO DECREASED GLUCOSE;  Start 2/11/19 at 


14:30


Dextrose (D50w Syringe) 25 ml Q15M  PRN IV DECREASED GLUCOSE;  Start 2/11/19 at 


14:30


Dextrose (D50w Syringe) 50 ml Q15M  PRN IV DECREASED GLUCOSE;  Start 2/11/19 at 


14:30


Glucagon (Glucagen) 1 mg Q15M  PRN IM DECREASED GLUCOSE;  Start 2/11/19 at 14:30


Glucose (Glutose) 15 gm Q15M  PRN BUCCAL DECREASED GLUCOSE Last administered on 


2/14/19at 08:36; Admin Dose 15 GM;  Start 2/11/19 at 14:30


Nicotine (Nicoderm 21 Mg/ 24hr) 1 patch DAILY TRANSDERM ;  Start 2/11/19 at 


16:30


Metoprolol Succinate (Toprol Xl) 25 mg DAILY PO  Last administered on 2/18/19at 


08:53; Admin Dose 25 MG;  Start 2/12/19 at 09:00


Patient Own Medication 1 ea BID PO  Last administered on 2/12/19at 08:09; Admin 


Dose 1 EA;  Start 2/11/19 at 21:00;  Status Hold


Famotidine (Pepcid) 20 mg Q24H PO  Last administered on 2/17/19at 21:06; Admin 


Dose 20 MG;  Start 2/11/19 at 22:00


Insulin Aspart (Novolog Insulin Pen) (Adult SC Insulin - Mild Algorithm)... AC 


MEALS AND  BEDTIME SC  Last administered on 2/18/19at 12:45; Admin Dose 3 UNIT; 


Start 2/12/19 at 17:25


Miscellaneous Information Patients own medicat... BID@10,16 XX  Last 


administered on 2/15/19at 16:28; Admin Dose 1,600 EA;  Start 2/13/19 at 10:00


Doxazosin Mesylate (Cardura) 4 mg HS PO  Last administered on 2/15/19at 21:14; 


Admin Dose 4 MG;  Start 2/14/19 at 21:00


Trazodone HCl (Desyrel) 50 mg HS PO ;  Start 2/16/19 at 00:30


Acetazolamide (Diamox) 500 mg DAILY IV  Last administered on 2/18/19at 08:54; 


Admin Dose 500 MG;  Start 2/18/19 at 09:00;  Stop 2/23/19 at 08:59


Furosemide (Lasix) 40 mg BID  DIURETICS PO ;  Start 2/18/19 at 18:00











ROSANNE HERNÁNDEZ MD              Feb 18, 2019 15:43

## 2019-02-18 NOTE — CONS
Assessment/Plan


Assessment/Plan


Hospital Course (Demo Recall)


IMP:


1.CHF- systolic acute on chronic


2.Cardiomyopathy- EF 30%


3.CKD


4.HTN


5.Dyslipidemia


6. Hepatomegaly-likley due to passive congestion of liver





Recc:


-Tele


-Continue lasix but change to PO and now diamox for contraction alkalosis and 


follow volume status and creatnine clsoely


-continue bb and cardura and if BP allows will start afterload reduction with 


hydralazine low dose


-Continue statin/Eliquis





Consultation Date/Type/Reason


Admit Date/Time


Feb 11, 2019 at 13:13


Initial Consult Date


2/11/19


Type of Consult


Cardiology


Reason for Consultation


CHF


Requesting Provider:  GORDO MCDERMOTT


Date/Time of Note


DATE: 2/18/19 


TIME: 12:09





Exam/Review of Systems


Vital Signs


Vitals





Vital Signs


  Date      Temp  Pulse  Resp  B/P (MAP)   Pulse Ox  O2          O2 Flow    FiO2


Time                                                 Delivery    Rate


   2/18/19  98.0     57    20      109/57       100                    3.0


     11:38                           (74)


   2/18/19                                           Nasal


     07:25                                           Cannula


   2/18/19                                                                    31


     04:29








Intake and Output





2/17/19 2/17/19 2/18/19





1515:00


23:00


07:00





IntakeIntake Total


650 ml


100 ml





OutputOutput Total


1200 ml





BalanceBalance


-550 ml


100 ml














Exam


Exam


Review of Systems:


CONSTITUTIONAL:  No fevers, chills.


PULMONARY:  No sob


CARDIOVASCULAR: No chest pain/palpitations


GASTROINTESTINAL:  No nausea/vomiting.


GENITOURINARY:  No hematuria/dysuria.


MUSCULOSKELETAL:  No myagias/arthalgias.


PSYCHIATRIC:  The patient denies depression.


NEUROLOGIC:   No weakness


Constitutional:  alert, oriented, well developed


Psych:  no complaints


Head:  normocephalic


ENMT:  mucosa pink and moist


Neck:  supple, jvd (9 cm water)


Respiratory:  diminished breath sounds (at bases/B)


Cardiovascular:  regular rate and rhythm


Gastrointestinal:  soft, non-tender


Musculoskeletal:  muscle tone (normal)


Extremities:  edema (normal)


Neurological:  other (None)





Labs


Result Diagram:  


2/18/19 0549                                                                    


           2/18/19 0549





Results 24hrs





Laboratory Tests


Test
                 2/17/19
17:31  2/17/19
20:31  2/18/19
05:49  2/18/19
08:17


Bedside Glucose              306  H          142                           129


White Blood Count                                           7.1


Red Blood Count                                           4.31  L


Hemoglobin                                                10.8  L


Hematocrit                                                34.6  L


Mean Corpuscular                                          80.3  L


Volume


Mean Corpuscular                                          25.1  L


Hemoglobin


Mean Corpuscular      
              
                   31.2  L
  



Hemoglobin
Concent


Red Cell                                                  18.1  H


Distribution Width


Platelet Count                                              166


Mean Platelet Volume                                      12.7  H


Immature                                                  0.300


Granulocytes %


Neutrophils %                                              66.2


Lymphocytes %                                              18.9


Monocytes %                                                10.5


Eosinophils %                                               3.4


Basophils %                                                 0.7


Nucleated Red Blood                                         0.0


Cells %


Immature                                                  0.020


Granulocytes #


Neutrophils #                                               4.7


Lymphocytes #                                               1.3


Monocytes #                                                 0.7


Eosinophils #                                               0.2


Basophils #                                                 0.1


Nucleated Red Blood                                         0.0


Cells #


Sodium Level                                                140


Potassium Level                                             4.2


Chloride Level                                              92  L


Carbon Dioxide Level                                       41  *H


Anion Gap                                                     7


Blood Urea Nitrogen                                         74  H


Creatinine                                                2.23  H


Est Glomerular        
              
                     29  L
  



Filtrat Rate
mL/min


Glucose Level                                               177


Calcium Level                                               9.4








Medications


Medications





Current Medications


IV Flush (NS 3 ml) 3 ml PER PROTOCOL IV ;  Start 2/11/19 at 14:00


Ondansetron HCl (Zofran Inj) 4 mg Q6H  PRN IV NAUSEA/VOMITING Last administered 


on 2/13/19at 04:47; Admin Dose 4 MG;  Start 2/11/19 at 14:00


Acetaminophen (Tylenol Tab) 650 mg Q6H  PRN PO .PAIN 1-3 OR TEMP Last 


administered on 2/13/19at 13:07; Admin Dose 650 MG;  Start 2/11/19 at 14:00


Acetaminophen/ Hydrocodone Bitart (Norco (5/325)) 1 tab Q6H  PRN PO .MOD PAIN 4-


6 Last administered on 2/12/19at 13:01; Admin Dose 1 TAB;  Start 2/11/19 at 


14:00


Morphine Sulfate (morphine) 6 mg Q4H  PRN PO .SEVERE PAIN 7-10;  Start 2/11/19 


at 14:00


Docusate Sodium (Colace) 100 mg Q12H  PRN PO .CONSTIPATION;  Start 2/11/19 at 


14:00


Magnesium Hydroxide (Milk Of Mag) 30 ml DAILY  PRN PO .CONSTIPATION Last 


administered on 2/12/19at 18:09; Admin Dose 30 ML;  Start 2/11/19 at 14:00


Lorazepam (Ativan) 0.5 mg Q6H  PRN PO ANXIETY;  Start 2/11/19 at 14:00


Albuterol/ Ipratropium (Duoneb) 3 ml Q4H RESP THERAPY  PRN HHN SHORTNESS OF 


BREATH Last administered on 2/14/19at 23:35; Admin Dose 3 ML;  Start 2/11/19 at 


14:00


Hydralazine HCl (Apresoline) 10 mg Q6H  PRN IV ELEVATED BLOOD PRESSURE;  Start 


2/11/19 at 14:00


Nitroglycerin (Nitroglycerin (Sl Tab) 0.4 Mg) 1 tab Q5M  PRN SL ANGINA;  Start 


2/11/19 at 14:00


Apixaban (Eliquis) 5 mg BID PO  Last administered on 2/18/19at 08:53; Admin Dose


5 MG;  Start 2/11/19 at 21:00


Digoxin (Digoxin) 0.125 mg DAILY@1300 PO  Last administered on 2/17/19at 13:00; 


Admin Dose 0.125 MG;  Start 2/12/19 at 13:00


Ergocalciferol (Drisdol) 50,000 unit Q7D PO  Last administered on 2/11/19at 


18:03; Admin Dose 50,000 UNIT;  Start 2/11/19 at 14:00


Ferrous Sulfate (Ferrous Sulfate (Ec)) 325 mg BID PO  Last administered on 


2/18/19at 08:52; Admin Dose 325 MG;  Start 2/11/19 at 21:00


Insulin Glargine (Lantus) 12 units QHS SC  Last administered on 2/17/19at 20:40;


Admin Dose 12 UNITS;  Start 2/11/19 at 21:00


Famotidine (Pepcid) 20 mg DAILY PO  Last administered on 2/18/19at 08:53; Admin 


Dose 20 MG;  Start 2/12/19 at 09:00


Atorvastatin Calcium (Lipitor) 80 mg HS PO  Last administered on 2/17/19at 


20:34; Admin Dose 80 MG;  Start 2/11/19 at 21:00


Diagnostic Test (Pha) (Accu-Chek) 1 ea 02 XX  Last administered on 2/17/19at 


02:29; Admin Dose 1 EA;  Start 2/12/19 at 02:00


Miscellaneous Information 1 ea NOTE XX ;  Start 2/11/19 at 14:30


Glucose (Glutose) 15 gm Q15M  PRN PO DECREASED GLUCOSE;  Start 2/11/19 at 14:30


Glucose (Glutose) 22.5 gm Q15M  PRN PO DECREASED GLUCOSE;  Start 2/11/19 at 


14:30


Dextrose (D50w Syringe) 25 ml Q15M  PRN IV DECREASED GLUCOSE;  Start 2/11/19 at 


14:30


Dextrose (D50w Syringe) 50 ml Q15M  PRN IV DECREASED GLUCOSE;  Start 2/11/19 at 


14:30


Glucagon (Glucagen) 1 mg Q15M  PRN IM DECREASED GLUCOSE;  Start 2/11/19 at 14:30


Glucose (Glutose) 15 gm Q15M  PRN BUCCAL DECREASED GLUCOSE Last administered on 


2/14/19at 08:36; Admin Dose 15 GM;  Start 2/11/19 at 14:30


Nicotine (Nicoderm 21 Mg/ 24hr) 1 patch DAILY TRANSDERM ;  Start 2/11/19 at 


16:30


Metoprolol Succinate (Toprol Xl) 25 mg DAILY PO  Last administered on 2/18/19at 


08:53; Admin Dose 25 MG;  Start 2/12/19 at 09:00


Patient Own Medication 1 ea BID PO  Last administered on 2/12/19at 08:09; Admin 


Dose 1 EA;  Start 2/11/19 at 21:00;  Status Hold


Famotidine (Pepcid) 20 mg Q24H PO  Last administered on 2/17/19at 21:06; Admin 


Dose 20 MG;  Start 2/11/19 at 22:00


Insulin Aspart (Novolog Insulin Pen) (Adult SC Insulin - Mild Algorithm)... AC 


MEALS AND  BEDTIME SC  Last administered on 2/17/19at 17:41; Admin Dose 5 UNIT; 


Start 2/12/19 at 17:25


Miscellaneous Information Patients own medicat... BID@10,16 XX  Last 


administered on 2/15/19at 16:28; Admin Dose 1,600 EA;  Start 2/13/19 at 10:00


Furosemide (Lasix) 40 mg 0900,1300,1700 IV  Last administered on 2/18/19at 


08:53; Admin Dose 40 MG;  Start 2/13/19 at 17:00


Metolazone (Zaroxolyn) 2.5 mg BID PO  Last administered on 2/18/19at 08:52; 


Admin Dose 2.5 MG;  Start 2/13/19 at 17:15


Doxazosin Mesylate (Cardura) 4 mg HS PO  Last administered on 2/15/19at 21:14; 


Admin Dose 4 MG;  Start 2/14/19 at 21:00


Trazodone HCl (Desyrel) 50 mg HS PO ;  Start 2/16/19 at 00:30


Acetazolamide (Diamox) 500 mg DAILY IV  Last administered on 2/18/19at 08:54; 


Admin Dose 500 MG;  Start 2/18/19 at 09:00;  Stop 2/23/19 at 08:59











ROSANNE WERNER               Feb 18, 2019 12:12

## 2019-02-19 VITALS — SYSTOLIC BLOOD PRESSURE: 100 MMHG | DIASTOLIC BLOOD PRESSURE: 54 MMHG | HEART RATE: 59 BPM

## 2019-02-19 VITALS — SYSTOLIC BLOOD PRESSURE: 87 MMHG | RESPIRATION RATE: 18 BRPM | DIASTOLIC BLOOD PRESSURE: 52 MMHG | HEART RATE: 53 BPM

## 2019-02-19 VITALS — DIASTOLIC BLOOD PRESSURE: 53 MMHG | RESPIRATION RATE: 18 BRPM | HEART RATE: 62 BPM | SYSTOLIC BLOOD PRESSURE: 111 MMHG

## 2019-02-19 VITALS — HEART RATE: 57 BPM

## 2019-02-19 VITALS — HEART RATE: 53 BPM

## 2019-02-19 VITALS — RESPIRATION RATE: 18 BRPM | DIASTOLIC BLOOD PRESSURE: 53 MMHG | SYSTOLIC BLOOD PRESSURE: 99 MMHG | HEART RATE: 67 BPM

## 2019-02-19 VITALS — DIASTOLIC BLOOD PRESSURE: 51 MMHG | SYSTOLIC BLOOD PRESSURE: 93 MMHG

## 2019-02-19 VITALS — HEART RATE: 63 BPM

## 2019-02-19 VITALS — DIASTOLIC BLOOD PRESSURE: 56 MMHG | RESPIRATION RATE: 18 BRPM | SYSTOLIC BLOOD PRESSURE: 102 MMHG | HEART RATE: 56 BPM

## 2019-02-19 VITALS — HEART RATE: 61 BPM

## 2019-02-19 LAB
ABNORMAL IP MESSAGE: 1
ADD MAN DIFF?: NO
ALLEN TEST: (no result)
ANION GAP: 14 (ref 5–13)
ARTERIAL BASE EXCESS: 12.1 MMOL/L (ref -3–3)
ARTERIAL BLOOD GAS OXYGEN SAT: 88.3 MMHG (ref 95–98)
ARTERIAL COHB: 0.8 % (ref 0–3)
ARTERIAL FRACTION OF OXYHGB: 87.3 % (ref 93–99)
ARTERIAL HCO3: 37.4 MMOL/L (ref 22–26)
ARTERIAL METHB: 0.3 % (ref 0–1.5)
ARTERIAL PCO2: 51.4 MMHG (ref 35–45)
BASOPHIL #: 0.1 10^3/UL (ref 0–0.1)
BASOPHILS %: 1 % (ref 0–2)
BLOOD UREA NITROGEN: 69 MG/DL (ref 7–20)
CALCIUM: 9.7 MG/DL (ref 8.4–10.2)
CARBON DIOXIDE: 41 MMOL/L (ref 21–31)
CHLORIDE: 87 MMOL/L (ref 97–110)
CREATININE: 2.39 MG/DL (ref 0.61–1.24)
EOSINOPHILS #: 0.1 10^3/UL (ref 0–0.5)
EOSINOPHILS %: 2 % (ref 0–7)
FIO2: 27 %
GLUCOSE: 139 MG/DL (ref 70–220)
HEMATOCRIT: 38.5 % (ref 42–52)
HEMOGLOBIN: 11.5 G/DL (ref 14–18)
IMMATURE GRANS #M: 0.02 10^3/UL (ref 0–0.03)
IMMATURE GRANS % (M): 0.3 % (ref 0–0.43)
LYMPHOCYTES #: 1.2 10^3/UL (ref 0.8–2.9)
LYMPHOCYTES %: 19.2 % (ref 15–51)
MEAN CORPUSCULAR HEMOGLOBIN: 24 PG (ref 29–33)
MEAN CORPUSCULAR HGB CONC: 29.9 G/DL (ref 32–37)
MEAN CORPUSCULAR VOLUME: 80.2 FL (ref 82–101)
MEAN PLATELET VOLUME: 11.9 FL (ref 7.4–10.4)
MODE: (no result)
MONOCYTE #: 0.6 10^3/UL (ref 0.3–0.9)
MONOCYTES %: 9.2 % (ref 0–11)
NEUTROPHIL #: 4.1 10^3/UL (ref 1.6–7.5)
NEUTROPHILS %: 68.3 % (ref 39–77)
NUCLEATED RED BLOOD CELLS #: 0 10^3/UL (ref 0–0)
NUCLEATED RED BLOOD CELLS%: 0 /100WBC (ref 0–0)
O2 A-A PPRESDIFF RESPIRATORY: 79 MMHG (ref 7–24)
PLATELET COUNT: 169 10^3/UL (ref 140–415)
POSITIVE DIFF: (no result)
POTASSIUM: 4.5 MMOL/L (ref 3.5–5.1)
RED BLOOD COUNT: 4.8 10^6/UL (ref 4.7–6.1)
RED CELL DISTRIBUTION WIDTH: 18.2 % (ref 11.5–14.5)
SODIUM: 142 MMOL/L (ref 135–144)
WHITE BLOOD COUNT: 6 10^3/UL (ref 4.8–10.8)

## 2019-02-19 RX ADMIN — INSULIN GLARGINE 1 UNITS: 100 INJECTION, SOLUTION SUBCUTANEOUS at 20:25

## 2019-02-19 RX ADMIN — DOXAZOSIN 1 MG: 4 TABLET ORAL at 20:09

## 2019-02-19 RX ADMIN — FAMOTIDINE 1 MG: 20 TABLET ORAL at 08:36

## 2019-02-19 RX ADMIN — METOPROLOL SUCCINATE 1 MG: 25 TABLET, EXTENDED RELEASE ORAL at 08:35

## 2019-02-19 RX ADMIN — FUROSEMIDE 1 MG: 40 TABLET ORAL at 05:37

## 2019-02-19 RX ADMIN — ATORVASTATIN CALCIUM 1 MG: 80 TABLET, FILM COATED ORAL at 20:15

## 2019-02-19 RX ADMIN — NICOTINE 1 PATCH: 21 PATCH, EXTENDED RELEASE TRANSDERMAL at 08:30

## 2019-02-19 RX ADMIN — ACETAZOLAMIDE SODIUM SCH MG: 500 INJECTION, POWDER, LYOPHILIZED, FOR SOLUTION INTRAVENOUS at 08:37

## 2019-02-19 RX ADMIN — APIXABAN 1 MG: 5 TABLET, FILM COATED ORAL at 20:15

## 2019-02-19 RX ADMIN — DOXAZOSIN SCH MG: 4 TABLET ORAL at 20:09

## 2019-02-19 RX ADMIN — DIGOXIN SCH MG: 125 TABLET ORAL at 13:00

## 2019-02-19 RX ADMIN — NICOTINE SCH PATCH: 21 PATCH, EXTENDED RELEASE TRANSDERMAL at 08:30

## 2019-02-19 RX ADMIN — FUROSEMIDE SCH MG: 40 TABLET ORAL at 17:12

## 2019-02-19 RX ADMIN — METOPROLOL SUCCINATE SCH MG: 25 TABLET, EXTENDED RELEASE ORAL at 08:35

## 2019-02-19 RX ADMIN — FAMOTIDINE SCH MG: 20 TABLET ORAL at 08:36

## 2019-02-19 RX ADMIN — INSULIN ASPART 1 UNIT: 100 INJECTION, SOLUTION INTRAVENOUS; SUBCUTANEOUS at 17:11

## 2019-02-19 RX ADMIN — FUROSEMIDE 1 MG: 40 TABLET ORAL at 17:12

## 2019-02-19 RX ADMIN — ACETAZOLAMIDE SODIUM 1 MG: 500 INJECTION, POWDER, LYOPHILIZED, FOR SOLUTION INTRAVENOUS at 08:37

## 2019-02-19 RX ADMIN — INSULIN ASPART 1 UNIT: 100 INJECTION, SOLUTION INTRAVENOUS; SUBCUTANEOUS at 20:25

## 2019-02-19 RX ADMIN — DIGOXIN 1 MG: 125 TABLET ORAL at 13:00

## 2019-02-19 RX ADMIN — ATORVASTATIN CALCIUM SCH MG: 80 TABLET, FILM COATED ORAL at 20:15

## 2019-02-19 RX ADMIN — INSULIN GLARGINE SCH UNITS: 100 INJECTION, SOLUTION SUBCUTANEOUS at 20:25

## 2019-02-19 RX ADMIN — FERROUS SULFATE TAB 325 MG (65 MG ELEMENTAL FE) 1 MG: 325 (65 FE) TAB at 20:15

## 2019-02-19 RX ADMIN — APIXABAN SCH MG: 5 TABLET, FILM COATED ORAL at 20:15

## 2019-02-19 RX ADMIN — INSULIN ASPART 1 UNIT: 100 INJECTION, SOLUTION INTRAVENOUS; SUBCUTANEOUS at 12:04

## 2019-02-19 RX ADMIN — APIXABAN 1 MG: 5 TABLET, FILM COATED ORAL at 08:36

## 2019-02-19 RX ADMIN — FERROUS SULFATE TAB 325 MG (65 MG ELEMENTAL FE) 1 MG: 325 (65 FE) TAB at 08:36

## 2019-02-19 RX ADMIN — FERROUS SULFATE TAB 325 MG (65 MG ELEMENTAL FE) SCH MG: 325 (65 FE) TAB at 20:15

## 2019-02-19 RX ADMIN — APIXABAN SCH MG: 5 TABLET, FILM COATED ORAL at 08:36

## 2019-02-19 RX ADMIN — FUROSEMIDE SCH MG: 40 TABLET ORAL at 05:37

## 2019-02-19 RX ADMIN — INSULIN ASPART 1 UNIT: 100 INJECTION, SOLUTION INTRAVENOUS; SUBCUTANEOUS at 08:42

## 2019-02-19 RX ADMIN — FERROUS SULFATE TAB 325 MG (65 MG ELEMENTAL FE) SCH MG: 325 (65 FE) TAB at 08:36

## 2019-02-19 NOTE — PN
Date/Time of Note


Date/Time of Note


DATE: 2/19/19 


TIME: 14:29





Assessment/Plan


VTE Prophylaxis


Risk score (from Ns)>0 risk:  5


SCD applied (from Ns):  No


SCD contraindicated:  other (no)


Pharmacological prophylaxis:  apixaban





Lines/Catheters


IV Catheter Type (from [facility]):  Saline Lock


Urinary Cath still in place:  No





Assessment/Plan


Assessment/Plan


68-year-old man coming in with chest pressure, shortness of breath, lower 


extremity swelling, signs of congestive heart failure exacerbation.





1.  Shortness of breath and chest pressure 


   -overall slowly improving now.


   -Continue fluid restriction as recommended by cardiology and renal teams. 


   -Currently holding diuresis due to severe contraction alkalosis. 


   - follow up Cardiology recommendations and continue digoxin for now.


   -Off of Entresto presently while in-house


2. Acute on chronic kidney disease.  Again, the kidney function appears to be 


slightly worse than when he was here 2 months ago.  Creatinine levels have been 


in the low to mid 2 range.  Patient having adequate urine output.


   -Follow-up recommendations from renal consult


   -Continue to carefully monitor BUN and creatinine levels and urine output. 


3.  Smoking history.  


   -Counseled on cessation. 


   -Continue nicotine patch.


4.  History of hypertension.  Again, see above.  


   - Continue current medications.  


   - Also hydralazine p.r.n.


5.  History of atrial fibrillation -appears stable


   -Monitor, continue Eliquis as well.


6.  Type 2 diabetes.  A1c equals 9.0, sugars presently stable 


   - continue Lantus and sliding scale insulin.


7.  Deep venous thrombosis prophylaxis-on Eliquis.


Result Diagram:  


2/19/19 0609                                                                    


           2/19/19 0609








Subjective


24 Hr Interval Summary


Free Text/Dictation


No acute overnight events. 


Patient feeling slightly better. Still requiring oxygen. Able to ambulate 


independently to bathroom.


He does have severe constipation and during a particularly forceful bowel 


movement this afternoon he did have small spots of blood on the tissue.





Exam/Review of Systems


Exam


Vitals





Vital Signs


  Date      Temp  Pulse  Resp  B/P (MAP)   Pulse Ox  O2          O2 Flow    FiO2


Time                                                 Delivery    Rate


   2/19/19           57


     12:49


   2/19/19  98.4           18  99/53 (68)        96


     12:11


   2/19/19                                           Nasal             2.0


     08:15                                           Cannula


   2/18/19                                                                    31


     04:29








Intake and Output





2/18/19 2/18/19 2/19/19





1515:00


23:00


07:00





IntakeIntake Total


600 ml





OutputOutput Total


1600 ml





BalanceBalance


-1000 ml











Exam





GENERAL:  lying in bed, in no acute distress.


HEENT:  Pupils equal, round and reactive to light.  Extraocular muscles are 


intact.


NECK:  Supple, no thyromegaly.


LUNGS:  Distant breath sounds bilaterally, no crackles appreciated. 


CARDIOVASCULAR:  Irregularly irregular heart rate.  No rubs or gallops.


ABDOMEN:  Soft, nontender and nondistended.  Normal bowel sounds.  No rebound or


guarding.


MUSCULOSKELETAL:  1+ pitting edema in bilateral lower extremities to the mid 


calves.





Medications


Medication





Current Medications


IV Flush (NS 3 ml) 3 ml PER PROTOCOL IV ;  Start 2/11/19 at 14:00


Ondansetron HCl (Zofran Inj) 4 mg Q6H  PRN IV NAUSEA/VOMITING Last administered 


on 2/13/19at 04:47; Admin Dose 4 MG;  Start 2/11/19 at 14:00


Acetaminophen (Tylenol Tab) 650 mg Q6H  PRN PO .PAIN 1-3 OR TEMP Last 


administered on 2/13/19at 13:07; Admin Dose 650 MG;  Start 2/11/19 at 14:00


Acetaminophen/ Hydrocodone Bitart (Norco (5/325)) 1 tab Q6H  PRN PO .MOD PAIN 4-


6 Last administered on 2/12/19at 13:01; Admin Dose 1 TAB;  Start 2/11/19 at 


14:00


Morphine Sulfate (morphine) 6 mg Q4H  PRN PO .SEVERE PAIN 7-10;  Start 2/11/19 


at 14:00


Docusate Sodium (Colace) 100 mg Q12H  PRN PO .CONSTIPATION Last administered on 


2/18/19at 20:37; Admin Dose 100 MG;  Start 2/11/19 at 14:00


Magnesium Hydroxide (Milk Of Mag) 30 ml DAILY  PRN PO .CONSTIPATION Last 


administered on 2/18/19at 20:06; Admin Dose 30 ML;  Start 2/11/19 at 14:00


Lorazepam (Ativan) 0.5 mg Q6H  PRN PO ANXIETY;  Start 2/11/19 at 14:00


Albuterol/ Ipratropium (Duoneb) 3 ml Q4H RESP THERAPY  PRN HHN SHORTNESS OF 


BREATH Last administered on 2/14/19at 23:35; Admin Dose 3 ML;  Start 2/11/19 at 


14:00


Hydralazine HCl (Apresoline) 10 mg Q6H  PRN IV ELEVATED BLOOD PRESSURE;  Start 


2/11/19 at 14:00


Nitroglycerin (Nitroglycerin (Sl Tab) 0.4 Mg) 1 tab Q5M  PRN SL ANGINA;  Start 


2/11/19 at 14:00


Apixaban (Eliquis) 5 mg BID PO  Last administered on 2/19/19at 08:36; Admin Dose


5 MG;  Start 2/11/19 at 21:00


Digoxin (Digoxin) 0.125 mg DAILY@1300 PO  Last administered on 2/18/19at 12:40; 


Admin Dose 0.125 MG;  Start 2/12/19 at 13:00


Ergocalciferol (Drisdol) 50,000 unit Q7D PO  Last administered on 2/18/19at 


13:00; Admin Dose 50,000 UNIT;  Start 2/11/19 at 14:00


Ferrous Sulfate (Ferrous Sulfate (Ec)) 325 mg BID PO  Last administered on 


2/19/19at 08:36; Admin Dose 325 MG;  Start 2/11/19 at 21:00


Insulin Glargine (Lantus) 12 units QHS SC  Last administered on 2/18/19at 21:25;


Admin Dose 12 UNITS;  Start 2/11/19 at 21:00


Famotidine (Pepcid) 20 mg DAILY PO  Last administered on 2/19/19at 08:36; Admin 


Dose 20 MG;  Start 2/12/19 at 09:00


Atorvastatin Calcium (Lipitor) 80 mg HS PO  Last administered on 2/18/19at 


20:06; Admin Dose 80 MG;  Start 2/11/19 at 21:00


Diagnostic Test (Pha) (Accu-Chek) 1 ea 02 XX  Last administered on 2/19/19at 


01:54; Admin Dose 1 EA;  Start 2/12/19 at 02:00


Miscellaneous Information 1 ea NOTE XX ;  Start 2/11/19 at 14:30


Glucose (Glutose) 15 gm Q15M  PRN PO DECREASED GLUCOSE;  Start 2/11/19 at 14:30


Glucose (Glutose) 22.5 gm Q15M  PRN PO DECREASED GLUCOSE;  Start 2/11/19 at 


14:30


Dextrose (D50w Syringe) 25 ml Q15M  PRN IV DECREASED GLUCOSE;  Start 2/11/19 at 


14:30


Dextrose (D50w Syringe) 50 ml Q15M  PRN IV DECREASED GLUCOSE;  Start 2/11/19 at 


14:30


Glucagon (Glucagen) 1 mg Q15M  PRN IM DECREASED GLUCOSE;  Start 2/11/19 at 14:30


Glucose (Glutose) 15 gm Q15M  PRN BUCCAL DECREASED GLUCOSE Last administered on 


2/14/19at 08:36; Admin Dose 15 GM;  Start 2/11/19 at 14:30


Nicotine (Nicoderm 21 Mg/ 24hr) 1 patch DAILY TRANSDERM ;  Start 2/11/19 at 


16:30


Metoprolol Succinate (Toprol Xl) 25 mg DAILY PO  Last administered on 2/18/19at 


08:53; Admin Dose 25 MG;  Start 2/12/19 at 09:00


Patient Own Medication 1 ea BID PO  Last administered on 2/12/19at 08:09; Admin 


Dose 1 EA;  Start 2/11/19 at 21:00;  Status Hold


Famotidine (Pepcid) 20 mg Q24H PO  Last administered on 2/18/19at 22:09; Admin 


Dose 20 MG;  Start 2/11/19 at 22:00


Insulin Aspart (Novolog Insulin Pen) (Adult SC Insulin - Mild Algorithm)... AC 


MEALS AND  BEDTIME SC  Last administered on 2/19/19at 12:04; Admin Dose 2 UNIT; 


Start 2/12/19 at 17:25


Miscellaneous Information Patients own medicat... BID@10,16 XX  Last 


administered on 2/15/19at 16:28; Admin Dose 1,600 EA;  Start 2/13/19 at 10:00


Doxazosin Mesylate (Cardura) 4 mg HS PO  Last administered on 2/15/19at 21:14; 


Admin Dose 4 MG;  Start 2/14/19 at 21:00


Trazodone HCl (Desyrel) 50 mg HS PO  Last administered on 2/18/19at 20:06; Admin


Dose 50 MG;  Start 2/16/19 at 00:30


Acetazolamide (Diamox) 500 mg DAILY IV  Last administered on 2/19/19at 08:37; 


Admin Dose 500 MG;  Start 2/18/19 at 09:00;  Stop 2/23/19 at 08:59


Furosemide (Lasix) 40 mg BID  DIURETICS PO  Last administered on 2/19/19at 


05:37; Admin Dose 40 MG;  Start 2/18/19 at 18:00


Senna (Senokot) 1 tab BID  PRN PO CONSTIPATION;  Start 2/19/19 at 13:30











ROSANNE HERNÁNDEZ MD              Feb 19, 2019 14:32

## 2019-02-19 NOTE — CONS
Assessment/Plan


Assessment/Plan


Assessment/Plan (Daily)


A 68-year-old male presenting with:


1.  Shortness of breath with bilateral lower extremity edema, orthopnea and PND 


likely secondary to acute on chronic congestive heart failure.


2.  Acute on chronic renal failure; however, the patient's baseline creatinine 


ranges from 1.9 to 2.  On last discharge in December, the creatinine was 1.9.  


This could be all secondary to cardiorenal as the patient is clearly in 


decompensated heart failure right now.  now reached a euvolemic state, pt had 


Renal U/S in 12/18 which showed chronci kidney disease. Cr 2.23 >2.39


3.  History of atrial fibrillation.


4.  Hypertension.


5.  Diabetes.


6.  Hyperlipidemia.


7.  BPH.


8.  Positive troponin with chest pain ? NSTEMI


9.  Elevated BNP.


10  Contraction alkalosis with worsening uremia





 


PLAN:


- Will check ABG to see if true contraction alkalosis


- neg 1 L


-changeD lasix to 40 bid yesterday> cw for now 


- diamox iv  500 mg iv for total 5 days, will reassess daily


- cw  hold off Entresto at this point.


-  Digoxin levels normal.


-. Keep a systolic blood pressure of greater than 100.


-   Renally dose all meds.


-   Avoid nephrotoxic agents.





Consultation Date/Type/Reason


Admit Date/Time


Feb 11, 2019 at 13:13


Initial Consult Date





Requesting Provider:  GORDO MCDERMOTT


Date/Time of Note


DATE: 2/19/19 


TIME: 14:45





24 HR Interval Summary


Free Text/Dictation


Patient is little more lethargic today


According to the wife patient did not sleep at night


Bilateral lower extremity edema is much improved





Exam/Review of Systems


Exam


Vitals





Vital Signs


  Date      Temp  Pulse  Resp  B/P (MAP)   Pulse Ox  O2          O2 Flow    FiO2


Time                                                 Delivery    Rate


   2/19/19           57


     12:49


   2/19/19  98.4           18  99/53 (68)        96


     12:11


   2/19/19                                           Nasal             2.0


     08:15                                           Cannula


   2/18/19                                                                    31


     04:29








Intake and Output





2/18/19 2/18/19 2/19/19





1515:00


23:00


07:00





IntakeIntake Total


600 ml





OutputOutput Total


1600 ml





BalanceBalance


-1000 ml











Exam


Respiratory:  diminished breath sounds


Cardiovascular:  regular rate and rhythm, JHONATAN


Gastrointestinal:  soft


Musculoskeletal:  nl extremities to inspection


Extremities:  normal pulses


+ Edema improved





Results


Result Diagram:  


2/19/19 0609                                                                    


           2/19/19 0609





Results 24hrs





Laboratory Tests


Test
                 2/18/19
17:36  2/18/19
21:21  2/19/19
01:52  2/19/19
06:09


Bedside Glucose               195            207            167


White Blood Count                                                          6.0


Red Blood Count                                                           4.80


Hemoglobin                                                               11.5  L


Hematocrit                                                               38.5  L


Mean Corpuscular                                                         80.2  L


Volume


Mean Corpuscular                                                         24.0  L


Hemoglobin


Mean Corpuscular      
              
              
                   29.9  L



Hemoglobin
Concent


Red Cell                                                                 18.2  H


Distribution Width


Platelet Count                                                             169


Mean Platelet Volume                                                     11.9  H


Immature                                                                 0.300


Granulocytes %


Neutrophils %                                                             68.3


Lymphocytes %                                                             19.2


Monocytes %                                                                9.2


Eosinophils %                                                              2.0


Basophils %                                                                1.0


Nucleated Red Blood                                                        0.0


Cells %


Immature                                                                 0.020


Granulocytes #


Neutrophils #                                                              4.1


Lymphocytes #                                                              1.2


Monocytes #                                                                0.6


Eosinophils #                                                              0.1


Basophils #                                                                0.1


Nucleated Red Blood                                                        0.0


Cells #


Sodium Level                                                               142


Potassium Level                                                            4.5


Chloride Level                                                             87  L


Carbon Dioxide Level                                                      41  *H


Anion Gap                                                                 14  #H


Blood Urea Nitrogen                                                        69  H


Creatinine                                                               2.39  H


Est Glomerular        
              
              
                     27  L



Filtrat Rate
mL/min


Glucose Level                                                              139


Calcium Level                                                              9.7


Test
                 2/19/19
08:34  2/19/19
11:57  
              



Bedside Glucose               172            201








Medications


Medication





Current Medications


IV Flush (NS 3 ml) 3 ml PER PROTOCOL IV ;  Start 2/11/19 at 14:00


Ondansetron HCl (Zofran Inj) 4 mg Q6H  PRN IV NAUSEA/VOMITING Last administered 


on 2/13/19at 04:47; Admin Dose 4 MG;  Start 2/11/19 at 14:00


Acetaminophen (Tylenol Tab) 650 mg Q6H  PRN PO .PAIN 1-3 OR TEMP Last 


administered on 2/13/19at 13:07; Admin Dose 650 MG;  Start 2/11/19 at 14:00


Acetaminophen/ Hydrocodone Bitart (Norco (5/325)) 1 tab Q6H  PRN PO .MOD PAIN 4-


6 Last administered on 2/12/19at 13:01; Admin Dose 1 TAB;  Start 2/11/19 at 


14:00


Morphine Sulfate (morphine) 6 mg Q4H  PRN PO .SEVERE PAIN 7-10;  Start 2/11/19 


at 14:00


Docusate Sodium (Colace) 100 mg Q12H  PRN PO .CONSTIPATION Last administered on 


2/18/19at 20:37; Admin Dose 100 MG;  Start 2/11/19 at 14:00


Magnesium Hydroxide (Milk Of Mag) 30 ml DAILY  PRN PO .CONSTIPATION Last 


administered on 2/18/19at 20:06; Admin Dose 30 ML;  Start 2/11/19 at 14:00


Lorazepam (Ativan) 0.5 mg Q6H  PRN PO ANXIETY;  Start 2/11/19 at 14:00


Albuterol/ Ipratropium (Duoneb) 3 ml Q4H RESP THERAPY  PRN HHN SHORTNESS OF BR


EATH Last administered on 2/14/19at 23:35; Admin Dose 3 ML;  Start 2/11/19 at 


14:00


Hydralazine HCl (Apresoline) 10 mg Q6H  PRN IV ELEVATED BLOOD PRESSURE;  Start 


2/11/19 at 14:00


Nitroglycerin (Nitroglycerin (Sl Tab) 0.4 Mg) 1 tab Q5M  PRN SL ANGINA;  Start 


2/11/19 at 14:00


Apixaban (Eliquis) 5 mg BID PO  Last administered on 2/19/19at 08:36; Admin Dose


5 MG;  Start 2/11/19 at 21:00


Digoxin (Digoxin) 0.125 mg DAILY@1300 PO  Last administered on 2/18/19at 12:40; 


Admin Dose 0.125 MG;  Start 2/12/19 at 13:00


Ergocalciferol (Drisdol) 50,000 unit Q7D PO  Last administered on 2/18/19at 


13:00; Admin Dose 50,000 UNIT;  Start 2/11/19 at 14:00


Ferrous Sulfate (Ferrous Sulfate (Ec)) 325 mg BID PO  Last administered on 


2/19/19at 08:36; Admin Dose 325 MG;  Start 2/11/19 at 21:00


Insulin Glargine (Lantus) 12 units QHS SC  Last administered on 2/18/19at 21:25;


Admin Dose 12 UNITS;  Start 2/11/19 at 21:00


Famotidine (Pepcid) 20 mg DAILY PO  Last administered on 2/19/19at 08:36; Admin 


Dose 20 MG;  Start 2/12/19 at 09:00


Atorvastatin Calcium (Lipitor) 80 mg HS PO  Last administered on 2/18/19at 20:06


; Admin Dose 80 MG;  Start 2/11/19 at 21:00


Diagnostic Test (Pha) (Accu-Chek) 1 ea 02 XX  Last administered on 2/19/19at 


01:54; Admin Dose 1 EA;  Start 2/12/19 at 02:00


Miscellaneous Information 1 ea NOTE XX ;  Start 2/11/19 at 14:30


Glucose (Glutose) 15 gm Q15M  PRN PO DECREASED GLUCOSE;  Start 2/11/19 at 14:30


Glucose (Glutose) 22.5 gm Q15M  PRN PO DECREASED GLUCOSE;  Start 2/11/19 at 


14:30


Dextrose (D50w Syringe) 25 ml Q15M  PRN IV DECREASED GLUCOSE;  Start 2/11/19 at 


14:30


Dextrose (D50w Syringe) 50 ml Q15M  PRN IV DECREASED GLUCOSE;  Start 2/11/19 at 


14:30


Glucagon (Glucagen) 1 mg Q15M  PRN IM DECREASED GLUCOSE;  Start 2/11/19 at 14:30


Glucose (Glutose) 15 gm Q15M  PRN BUCCAL DECREASED GLUCOSE Last administered on 


2/14/19at 08:36; Admin Dose 15 GM;  Start 2/11/19 at 14:30


Nicotine (Nicoderm 21 Mg/ 24hr) 1 patch DAILY TRANSDERM ;  Start 2/11/19 at 


16:30


Metoprolol Succinate (Toprol Xl) 25 mg DAILY PO  Last administered on 2/18/19at 


08:53; Admin Dose 25 MG;  Start 2/12/19 at 09:00


Patient Own Medication 1 ea BID PO  Last administered on 2/12/19at 08:09; Admin 


Dose 1 EA;  Start 2/11/19 at 21:00;  Status Hold


Famotidine (Pepcid) 20 mg Q24H PO  Last administered on 2/18/19at 22:09; Admin 


Dose 20 MG;  Start 2/11/19 at 22:00


Insulin Aspart (Novolog Insulin Pen) (Adult SC Insulin - Mild Algorithm)... AC 


MEALS AND  BEDTIME SC  Last administered on 2/19/19at 12:04; Admin Dose 2 UNIT; 


Start 2/12/19 at 17:25


Miscellaneous Information Patients own medicat... BID@10,16 XX  Last admin


istered on 2/15/19at 16:28; Admin Dose 1,600 EA;  Start 2/13/19 at 10:00


Doxazosin Mesylate (Cardura) 4 mg HS PO  Last administered on 2/15/19at 21:14; 


Admin Dose 4 MG;  Start 2/14/19 at 21:00


Trazodone HCl (Desyrel) 50 mg HS PO  Last administered on 2/18/19at 20:06; Admin


Dose 50 MG;  Start 2/16/19 at 00:30


Acetazolamide (Diamox) 500 mg DAILY IV  Last administered on 2/19/19at 08:37; 


Admin Dose 500 MG;  Start 2/18/19 at 09:00;  Stop 2/23/19 at 08:59


Furosemide (Lasix) 40 mg BID  DIURETICS PO  Last administered on 2/19/19at 


05:37; Admin Dose 40 MG;  Start 2/18/19 at 18:00


Senna (Senokot) 1 tab BID  PRN PO CONSTIPATION;  Start 2/19/19 at 13:30











JOSE YANG MD              Feb 19, 2019 14:47

## 2019-02-19 NOTE — CONS
Consult Date/Type/Reason


Admit Date/Time


Feb 11, 2019 at 13:13


Initial Consult Date





Requesting Provider:  GORDO MCDERMOTT


Date/Time of Note


DATE: 2/19/19 


TIME: 09:58





Subjective


NO acute events - tolerated PO diuresis - dispo to follow. 





ROS: No fever, no chills, no nausea, no vomiting, no diarrhea/constipation


        No recent weight changes


        No chest pain, no PND, no orthopnea - improved SOB. 


        No dizziness, blurred vision


        No thirst, no heat or cold intolerance





Objective


Vitals





Vital Signs


  Date      Temp  Pulse  Resp  B/P (MAP)   Pulse Ox  O2          O2 Flow    FiO2


Time                                                 Delivery    Rate


   2/19/19           53


     09:05


   2/19/19  98.8           18      102/56        95  Nasal


     07:27                           (71)            Cannula


   2/19/19                                                             3.0


     03:05


   2/18/19                                                                    31


     04:29








Intake and Output





2/18/19 2/18/19 2/19/19





1414:59


22:59


06:59





IntakeIntake Total


600 ml





OutputOutput Total


1600 ml





BalanceBalance


-1000 ml











Exam


General: WN/WD/NAD, AOx 3


HEENT: Unicetric/atraumatic/EOMI (follow commands)


NECK: JVD elevated, no thyromegaly


Lymph: no lymphadenopathy


HEART: regular with no S3, II/VI systolic murmur at apex, PMI L 


LUNGS: Coarse sounds


ABD: soft, NT, ND, +BS


: Intact


Neuro: non focal


SKIN: chronic changes


EXT: trace edema





Results/Medications


Result Diagram:  


2/19/19 0609                                                                    


           2/19/19 0609





Results 24 hrs





Laboratory Tests


Test
                 2/18/19
12:39  2/18/19
17:36  2/18/19
21:21  2/19/19
01:52


Bedside Glucose              225  H          195            207            167


Test
                 2/19/19
06:09  2/19/19
08:34  
              



White Blood Count             6.0


Red Blood Count              4.80


Hemoglobin                  11.5  L


Hematocrit                  38.5  L


Mean Corpuscular            80.2  L


Volume


Mean Corpuscular            24.0  L


Hemoglobin


Mean Corpuscular           29.9  L
  
              
              



Hemoglobin
Concent


Red Cell                    18.2  H


Distribution Width


Platelet Count                169


Mean Platelet Volume        11.9  H


Immature                    0.300


Granulocytes %


Neutrophils %                68.3


Lymphocytes %                19.2


Monocytes %                   9.2


Eosinophils %                 2.0


Basophils %                   1.0


Nucleated Red Blood           0.0


Cells %


Immature                    0.020


Granulocytes #


Neutrophils #                 4.1


Lymphocytes #                 1.2


Monocytes #                   0.6


Eosinophils #                 0.1


Basophils #                   0.1


Nucleated Red Blood           0.0


Cells #


Sodium Level                  142


Potassium Level               4.5


Chloride Level                87  L


Carbon Dioxide Level         41  *H


Anion Gap                    14  #H


Blood Urea Nitrogen           69  H


Creatinine                  2.39  H


Est Glomerular               27  L
  
              
              



Filtrat Rate
mL/min


Glucose Level                 139


Calcium Level                 9.7


Bedside Glucose                              172





Home Meds


Reported Medications


Metoprolol Succinate* (Toprol XL*) 25 Mg Tab.sr.24h, 25 MG PO DAILY, #30 TAB


   2/11/19


Sacubitril/Valsartan (Entresto 97 mg-103 mg Tablet) 1 Each Tablet, 1 TAB PO BID


   12/7/18


Digoxin* (Digox*) 125 Mcg Tablet, 0.125 MG PO DAILY, TAB


   12/7/18


Apixaban* (Eliquis*) 5 Mg Tablet, 5 MG PO BID, TAB


   3/14/16


Insulin Aspart* (Novolog Insulin Vial*) 100 U/Ml Vial, 0 SC SLIDING SCALE AC, 


VIAL


   3/14/16


Insulin Glargine* (Lantus*) 100 Unit/Ml Soln, 12 UNIT SC QHS, #1 VIAL


   3/14/16


Esomeprazole Mag Trihydrate (Nexium) 40 Mg Capsule.dr, 40 MG PO DAILY, #30 CAP


   3/14/16


Furosemide* (Furosemide*) 40 Mg Tablet, 40 MG PO DAILY, TAB


   3/14/16


Amlodipine Besylate* (Norvasc*) 5 Mg Tablet, 5 MG PO DAILY, TAB


   9/15/15


Rosuvastatin Calcium* (Crestor*) 20 Mg Tablet, 20 MG PO HS, TAB


   9/15/15


Ergocalciferol* (Drisdol* (Vitamin D2)) 50,000 Unit Capsule, 56231 UNIT PO Q7D, 


CAP


    MONDAYS


   9/15/15


Doxazosin Mesylate* (Doxazosin Mesylate*) 4 Mg Tablet, 4 MG PO BID, TAB


   3/13/15


Ferrous Sulfate* (Ferrous Sulfate*) 325 Mg Tabec, 325 MG PO BID, TAB


   3/13/15


Medications





Current Medications


IV Flush (NS 3 ml) 3 ml PER PROTOCOL IV ;  Start 2/11/19 at 14:00


Ondansetron HCl (Zofran Inj) 4 mg Q6H  PRN IV NAUSEA/VOMITING Last administered 


on 2/13/19at 04:47; Admin Dose 4 MG;  Start 2/11/19 at 14:00


Acetaminophen (Tylenol Tab) 650 mg Q6H  PRN PO .PAIN 1-3 OR TEMP Last 


administered on 2/13/19at 13:07; Admin Dose 650 MG;  Start 2/11/19 at 14:00


Acetaminophen/ Hydrocodone Bitart (Norco (5/325)) 1 tab Q6H  PRN PO .MOD PAIN 4-


6 Last administered on 2/12/19at 13:01; Admin Dose 1 TAB;  Start 2/11/19 at 1


4:00


Morphine Sulfate (morphine) 6 mg Q4H  PRN PO .SEVERE PAIN 7-10;  Start 2/11/19 


at 14:00


Docusate Sodium (Colace) 100 mg Q12H  PRN PO .CONSTIPATION Last administered on 


2/18/19at 20:37; Admin Dose 100 MG;  Start 2/11/19 at 14:00


Magnesium Hydroxide (Milk Of Mag) 30 ml DAILY  PRN PO .CONSTIPATION Last 


administered on 2/18/19at 20:06; Admin Dose 30 ML;  Start 2/11/19 at 14:00


Lorazepam (Ativan) 0.5 mg Q6H  PRN PO ANXIETY;  Start 2/11/19 at 14:00


Albuterol/ Ipratropium (Duoneb) 3 ml Q4H RESP THERAPY  PRN HHN SHORTNESS OF 


BREATH Last administered on 2/14/19at 23:35; Admin Dose 3 ML;  Start 2/11/19 at 


14:00


Hydralazine HCl (Apresoline) 10 mg Q6H  PRN IV ELEVATED BLOOD PRESSURE;  Start 


2/11/19 at 14:00


Nitroglycerin (Nitroglycerin (Sl Tab) 0.4 Mg) 1 tab Q5M  PRN SL ANGINA;  Start 


2/11/19 at 14:00


Apixaban (Eliquis) 5 mg BID PO  Last administered on 2/19/19at 08:36; Admin Dose


5 MG;  Start 2/11/19 at 21:00


Digoxin (Digoxin) 0.125 mg DAILY@1300 PO  Last administered on 2/18/19at 12:40; 


Admin Dose 0.125 MG;  Start 2/12/19 at 13:00


Ergocalciferol (Drisdol) 50,000 unit Q7D PO  Last administered on 2/18/19at 


13:00; Admin Dose 50,000 UNIT;  Start 2/11/19 at 14:00


Ferrous Sulfate (Ferrous Sulfate (Ec)) 325 mg BID PO  Last administered on 


2/19/19at 08:36; Admin Dose 325 MG;  Start 2/11/19 at 21:00


Insulin Glargine (Lantus) 12 units QHS SC  Last administered on 2/18/19at 21:25;


Admin Dose 12 UNITS;  Start 2/11/19 at 21:00


Famotidine (Pepcid) 20 mg DAILY PO  Last administered on 2/19/19at 08:36; Admin 


Dose 20 MG;  Start 2/12/19 at 09:00


Atorvastatin Calcium (Lipitor) 80 mg HS PO  Last administered on 2/18/19at 


20:06; Admin Dose 80 MG;  Start 2/11/19 at 21:00


Diagnostic Test (Pha) (Accu-Chek) 1 ea 02 XX  Last administered on 2/19/19at 


01:54; Admin Dose 1 EA;  Start 2/12/19 at 02:00


Miscellaneous Information 1 ea NOTE XX ;  Start 2/11/19 at 14:30


Glucose (Glutose) 15 gm Q15M  PRN PO DECREASED GLUCOSE;  Start 2/11/19 at 14:30


Glucose (Glutose) 22.5 gm Q15M  PRN PO DECREASED GLUCOSE;  Start 2/11/19 at 


14:30


Dextrose (D50w Syringe) 25 ml Q15M  PRN IV DECREASED GLUCOSE;  Start 2/11/19 at 


14:30


Dextrose (D50w Syringe) 50 ml Q15M  PRN IV DECREASED GLUCOSE;  Start 2/11/19 at 


14:30


Glucagon (Glucagen) 1 mg Q15M  PRN IM DECREASED GLUCOSE;  Start 2/11/19 at 14:30


Glucose (Glutose) 15 gm Q15M  PRN BUCCAL DECREASED GLUCOSE Last administered on 


2/14/19at 08:36; Admin Dose 15 GM;  Start 2/11/19 at 14:30


Nicotine (Nicoderm 21 Mg/ 24hr) 1 patch DAILY TRANSDERM ;  Start 2/11/19 at 


16:30


Metoprolol Succinate (Toprol Xl) 25 mg DAILY PO  Last administered on 2/18/19at 


08:53; Admin Dose 25 MG;  Start 2/12/19 at 09:00


Patient Own Medication 1 ea BID PO  Last administered on 2/12/19at 08:09; Admin 


Dose 1 EA;  Start 2/11/19 at 21:00;  Status Hold


Famotidine (Pepcid) 20 mg Q24H PO  Last administered on 2/18/19at 22:09; Admin 


Dose 20 MG;  Start 2/11/19 at 22:00


Insulin Aspart (Novolog Insulin Pen) (Adult SC Insulin - Mild Algorithm)... AC 


MEALS AND  BEDTIME SC  Last administered on 2/19/19at 08:42; Admin Dose 1 UNIT; 


Start 2/12/19 at 17:25


Miscellaneous Information Patients own medicat... BID@10,16 XX  Last 


administered on 2/15/19at 16:28; Admin Dose 1,600 EA;  Start 2/13/19 at 10:00


Doxazosin Mesylate (Cardura) 4 mg HS PO  Last administered on 2/15/19at 21:14; 


Admin Dose 4 MG;  Start 2/14/19 at 21:00


Trazodone HCl (Desyrel) 50 mg HS PO  Last administered on 2/18/19at 20:06; Admin


Dose 50 MG;  Start 2/16/19 at 00:30


Acetazolamide (Diamox) 500 mg DAILY IV  Last administered on 2/19/19at 08:37; 


Admin Dose 500 MG;  Start 2/18/19 at 09:00;  Stop 2/23/19 at 08:59


Furosemide (Lasix) 40 mg BID  DIURETICS PO  Last administered on 2/19/19at 


05:37; Admin Dose 40 MG;  Start 2/18/19 at 18:00





Assessment/Plan


Hospital Course (Demo Recall)


1.CHF- systolic acute on chronic - responded wel to diuresis - con't Rx and 


afterload reduction. Better now - to PO - stable- Cr ok at 2.39. 


2.Cardiomyopathy- EF 30% - ICD in place. 


3.CKD - Cr at 2.4 - close to baseline - with diuresis. Stable x 4 days.  Renal 


team on the case. 


4.HTN - well Rx now. WEll Rx. Treated., 


5.Dyslipidemia


6. Hepatomegaly-likley due to passive congestion of liver - primary follows.











DIO HARPER MD                 Feb 19, 2019 10:00

## 2019-02-20 VITALS — HEART RATE: 60 BPM

## 2019-02-20 VITALS — DIASTOLIC BLOOD PRESSURE: 51 MMHG | RESPIRATION RATE: 18 BRPM | SYSTOLIC BLOOD PRESSURE: 91 MMHG | HEART RATE: 57 BPM

## 2019-02-20 VITALS — SYSTOLIC BLOOD PRESSURE: 101 MMHG | RESPIRATION RATE: 18 BRPM | DIASTOLIC BLOOD PRESSURE: 59 MMHG | HEART RATE: 87 BPM

## 2019-02-20 VITALS — HEART RATE: 67 BPM | DIASTOLIC BLOOD PRESSURE: 54 MMHG | SYSTOLIC BLOOD PRESSURE: 92 MMHG | RESPIRATION RATE: 18 BRPM

## 2019-02-20 VITALS — DIASTOLIC BLOOD PRESSURE: 57 MMHG | HEART RATE: 62 BPM | RESPIRATION RATE: 19 BRPM | SYSTOLIC BLOOD PRESSURE: 99 MMHG

## 2019-02-20 VITALS — HEART RATE: 58 BPM

## 2019-02-20 VITALS — DIASTOLIC BLOOD PRESSURE: 55 MMHG | HEART RATE: 52 BPM | SYSTOLIC BLOOD PRESSURE: 99 MMHG

## 2019-02-20 VITALS — DIASTOLIC BLOOD PRESSURE: 72 MMHG | SYSTOLIC BLOOD PRESSURE: 102 MMHG | HEART RATE: 87 BPM | RESPIRATION RATE: 18 BRPM

## 2019-02-20 VITALS — HEART RATE: 54 BPM

## 2019-02-20 VITALS — HEART RATE: 55 BPM

## 2019-02-20 LAB
ANION GAP: 6 (ref 5–13)
BLOOD UREA NITROGEN: 70 MG/DL (ref 7–20)
CALCIUM: 9.6 MG/DL (ref 8.4–10.2)
CARBON DIOXIDE: 44 MMOL/L (ref 21–31)
CHLORIDE: 89 MMOL/L (ref 97–110)
CREATININE: 2.69 MG/DL (ref 0.61–1.24)
DIGOXIN: 1.1 NG/ML (ref 1–2)
GLUCOSE: 157 MG/DL (ref 70–220)
POTASSIUM: 4 MMOL/L (ref 3.5–5.1)
SODIUM: 139 MMOL/L (ref 135–144)

## 2019-02-20 RX ADMIN — METOPROLOL SUCCINATE 1 MG: 25 TABLET, EXTENDED RELEASE ORAL at 08:15

## 2019-02-20 RX ADMIN — INSULIN GLARGINE SCH UNITS: 100 INJECTION, SOLUTION SUBCUTANEOUS at 21:03

## 2019-02-20 RX ADMIN — APIXABAN SCH MG: 5 TABLET, FILM COATED ORAL at 08:14

## 2019-02-20 RX ADMIN — INSULIN ASPART 1 UNIT: 100 INJECTION, SOLUTION INTRAVENOUS; SUBCUTANEOUS at 08:27

## 2019-02-20 RX ADMIN — INSULIN ASPART 1 UNIT: 100 INJECTION, SOLUTION INTRAVENOUS; SUBCUTANEOUS at 11:43

## 2019-02-20 RX ADMIN — FUROSEMIDE SCH MG: 40 TABLET ORAL at 05:49

## 2019-02-20 RX ADMIN — FERROUS SULFATE TAB 325 MG (65 MG ELEMENTAL FE) SCH MG: 325 (65 FE) TAB at 08:13

## 2019-02-20 RX ADMIN — NICOTINE SCH PATCH: 21 PATCH, EXTENDED RELEASE TRANSDERMAL at 08:15

## 2019-02-20 RX ADMIN — APIXABAN 1 MG: 5 TABLET, FILM COATED ORAL at 20:51

## 2019-02-20 RX ADMIN — DOXAZOSIN 1 MG: 4 TABLET ORAL at 20:43

## 2019-02-20 RX ADMIN — FERROUS SULFATE TAB 325 MG (65 MG ELEMENTAL FE) SCH MG: 325 (65 FE) TAB at 20:52

## 2019-02-20 RX ADMIN — DIGOXIN SCH MG: 125 TABLET ORAL at 13:00

## 2019-02-20 RX ADMIN — FERROUS SULFATE TAB 325 MG (65 MG ELEMENTAL FE) 1 MG: 325 (65 FE) TAB at 08:13

## 2019-02-20 RX ADMIN — ATORVASTATIN CALCIUM 1 MG: 80 TABLET, FILM COATED ORAL at 20:52

## 2019-02-20 RX ADMIN — ACETAZOLAMIDE SODIUM SCH MG: 500 INJECTION, POWDER, LYOPHILIZED, FOR SOLUTION INTRAVENOUS at 08:13

## 2019-02-20 RX ADMIN — NICOTINE 1 PATCH: 21 PATCH, EXTENDED RELEASE TRANSDERMAL at 08:15

## 2019-02-20 RX ADMIN — ACETAZOLAMIDE SODIUM SCH MG: 500 INJECTION, POWDER, LYOPHILIZED, FOR SOLUTION INTRAVENOUS at 20:51

## 2019-02-20 RX ADMIN — FERROUS SULFATE TAB 325 MG (65 MG ELEMENTAL FE) 1 MG: 325 (65 FE) TAB at 20:52

## 2019-02-20 RX ADMIN — FUROSEMIDE 1 MG: 40 TABLET ORAL at 05:49

## 2019-02-20 RX ADMIN — INSULIN ASPART 1 UNIT: 100 INJECTION, SOLUTION INTRAVENOUS; SUBCUTANEOUS at 17:49

## 2019-02-20 RX ADMIN — APIXABAN SCH MG: 5 TABLET, FILM COATED ORAL at 20:51

## 2019-02-20 RX ADMIN — METOPROLOL SUCCINATE SCH MG: 25 TABLET, EXTENDED RELEASE ORAL at 08:15

## 2019-02-20 RX ADMIN — ACETAZOLAMIDE SODIUM 1 MG: 500 INJECTION, POWDER, LYOPHILIZED, FOR SOLUTION INTRAVENOUS at 20:51

## 2019-02-20 RX ADMIN — ACETAZOLAMIDE SODIUM 1 MG: 500 INJECTION, POWDER, LYOPHILIZED, FOR SOLUTION INTRAVENOUS at 08:13

## 2019-02-20 RX ADMIN — ACETAMINOPHEN 1 MG: 325 TABLET, FILM COATED ORAL at 01:50

## 2019-02-20 RX ADMIN — DIGOXIN 1 MG: 125 TABLET ORAL at 13:00

## 2019-02-20 RX ADMIN — ATORVASTATIN CALCIUM SCH MG: 80 TABLET, FILM COATED ORAL at 20:52

## 2019-02-20 RX ADMIN — FAMOTIDINE SCH MG: 20 TABLET ORAL at 08:13

## 2019-02-20 RX ADMIN — INSULIN GLARGINE 1 UNITS: 100 INJECTION, SOLUTION SUBCUTANEOUS at 21:03

## 2019-02-20 RX ADMIN — DOXAZOSIN SCH MG: 4 TABLET ORAL at 20:43

## 2019-02-20 RX ADMIN — APIXABAN 1 MG: 5 TABLET, FILM COATED ORAL at 08:14

## 2019-02-20 RX ADMIN — INSULIN ASPART 1 UNIT: 100 INJECTION, SOLUTION INTRAVENOUS; SUBCUTANEOUS at 20:52

## 2019-02-20 RX ADMIN — FAMOTIDINE 1 MG: 20 TABLET ORAL at 08:13

## 2019-02-20 NOTE — CONS
Assessment/Plan


Assessment/Plan


Assessment/Plan (Daily)


A 68-year-old male presenting with:


1.  Shortness of breath with bilateral lower extremity edema, orthopnea and PND 


likely secondary to acute on chronic congestive heart failure.


2.  Acute on chronic renal failure; however, the patient's baseline creatinine 


ranges from 1.9 to 2.  On last discharge in December, the creatinine was 1.9.  


This could be all secondary to cardiorenal as the patient is clearly in 


decompensated heart failure right now.  now reached a euvolemic state, pt had 


Renal U/S in 12/18 which showed chronci kidney disease. Cr 2.23 >2.39>2.69


3.  History of atrial fibrillation.


4.  Hypertension.


5.  Diabetes.


6.  Hyperlipidemia.


7.  BPH.


8.  Positive troponin with chest pain ? NSTEMI


9.  Elevated BNP.


10  Contraction alkalosis with worsening uremia today worsening bicarb 


11 hypoxia on ABG due to CHF





 


PLAN:


-Hold lasix due to contraction alkalosis


- iv diamox 2 doses


- Consider chest US and thoracentesis on rt ?


- cw  hold off Entresto at this point.


-  Digoxin levels normal.


-. Keep a systolic blood pressure of greater than 100.


-   Renally dose all meds.


-   Avoid nephrotoxic agents.





Consultation Date/Type/Reason


Admit Date/Time


Feb 11, 2019 at 13:13


Initial Consult Date





Requesting Provider:  GORDO MCDERMOTT


Date/Time of Note


DATE: 2/20/19 


TIME: 10:52





24 HR Interval Summary


Free Text/Dictation


Feels that the breathing has improved 7


bilateral lower extremity edema has improved


Bicarb 44 and Cr UPTRENDING





Exam/Review of Systems


Exam


Vitals





Vital Signs


  Date      Temp  Pulse  Resp  B/P (MAP)   Pulse Ox  O2          O2 Flow    FiO2


Time                                                 Delivery    Rate


   2/20/19           58


     08:42


   2/20/19                                           Nasal             2.0


     07:50                                           Cannula


   2/20/19  97.8           18      101/59        99


     07:34                           (73)


   2/20/19                                                                    30


     01:40








Intake and Output





2/19/19 2/19/19 2/20/19





1414:59


22:59


06:59





IntakeIntake Total


600 ml


600 ml


800 ml





OutputOutput Total


1000 ml


1150 ml


900 ml





BalanceBalance


-400 ml


-550 ml


-100 ml











Exam


Respiratory:  diminished breath sounds


Cardiovascular:  regular rate and rhythm, JHONATAN


Gastrointestinal:  soft


Musculoskeletal:  nl extremities to inspection


Extremities:  normal pulses


+ Edema improved





Results


Result Diagram:  


2/19/19 0609                                                                    


           2/20/19 0731





Results 24hrs





Laboratory Tests


Test
              2/19/19
11:57     2/19/19
15:00  2/19/19
17:03  2/19/19
20:09


Bedside Glucose            201                             260  H         239  H


Blood Gas          
              Blood arterial
   
              



Specimen Source



Arterial Blood     
              2/19/2019
3:40:3  
              



Date Drawn
                                   8 PM


Arterial Blood pH  
                     7.480  H
  
              



(Temp
corrected)


Arterial Blood     
                      51.4  H
  
              



pCO2


(Temp
correct)


Arterial Blood     
                     52.7  *L
  
              



pO2


(Temp
corrected)


Arterial Blood                             37.4  H


HCO3


Arterial Blood                             12.1  H


Base Excess


Arterial Blood     
                      88.3  L
  
              



Oxygen
Saturation


Matty Test                        ACCEPTAB


Arterial Blood     
              Right Radial  
   
              



Gas Puncture
Site


Arterial           
                        0.8  
  
              



Blood
Carboxyhemo


globin


Arterial Blood                               0.3


Methemoglobin


Blood Gas A-a O2                           79.0  H


Differential


Oxyhemoglobin                              87.3  L


Percent


Blood Gas                                   37.0


Temperature


Blood Gas                         NASAL CANNULA


Modality


FiO2                                        27.0


Blood Gas          
                
      
              



Critical Value


Read
Back


Blood Gas                         RT


Notified Whom


Blood Gas          
              2/19/2019
3:50:1  
              



Notified Time
                                6 PM


Test
              2/20/19
01:43     2/20/19
07:31  2/20/19
08:12  



Bedside Glucose           239  H                            147


Sodium Level                                 139


Potassium Level                              4.0


Chloride Level                               89  L


Carbon Dioxide                              44  *H


Level


Anion Gap                                     6  #


Blood Urea                                   70  H


Nitrogen


Creatinine                                 2.69  H


Est Glomerular     
                        24  L
  
              



Filtrat


Rate
mL/min


Glucose Level                                157


Calcium Level                                9.6








Medications


Medication





Current Medications


IV Flush (NS 3 ml) 3 ml PER PROTOCOL IV ;  Start 2/11/19 at 14:00


Ondansetron HCl (Zofran Inj) 4 mg Q6H  PRN IV NAUSEA/VOMITING Last administered 


on 2/13/19at 04:47; Admin Dose 4 MG;  Start 2/11/19 at 14:00


Acetaminophen (Tylenol Tab) 650 mg Q6H  PRN PO .PAIN 1-3 OR TEMP Last admini


stered on 2/20/19at 01:50; Admin Dose 650 MG;  Start 2/11/19 at 14:00


Acetaminophen/ Hydrocodone Bitart (Norco (5/325)) 1 tab Q6H  PRN PO .MOD PAIN 4-


6 Last administered on 2/12/19at 13:01; Admin Dose 1 TAB;  Start 2/11/19 at 


14:00


Morphine Sulfate (morphine) 6 mg Q4H  PRN PO .SEVERE PAIN 7-10;  Start 2/11/19 


at 14:00


Docusate Sodium (Colace) 100 mg Q12H  PRN PO .CONSTIPATION Last administered on 


2/18/19at 20:37; Admin Dose 100 MG;  Start 2/11/19 at 14:00


Magnesium Hydroxide (Milk Of Mag) 30 ml DAILY  PRN PO .CONSTIPATION Last 


administered on 2/18/19at 20:06; Admin Dose 30 ML;  Start 2/11/19 at 14:00


Lorazepam (Ativan) 0.5 mg Q6H  PRN PO ANXIETY;  Start 2/11/19 at 14:00


Albuterol/ Ipratropium (Duoneb) 3 ml Q4H RESP THERAPY  PRN HHN SHORTNESS OF 


BREATH Last administered on 2/14/19at 23:35; Admin Dose 3 ML;  Start 2/11/19 at 


14:00


Hydralazine HCl (Apresoline) 10 mg Q6H  PRN IV ELEVATED BLOOD PRESSURE;  Start 


2/11/19 at 14:00


Nitroglycerin (Nitroglycerin (Sl Tab) 0.4 Mg) 1 tab Q5M  PRN SL ANGINA;  Start 


2/11/19 at 14:00


Apixaban (Eliquis) 5 mg BID PO  Last administered on 2/20/19at 08:14; Admin Dose


5 MG;  Start 2/11/19 at 21:00


Digoxin (Digoxin) 0.125 mg DAILY@1300 PO  Last administered on 2/18/19at 12:40; 


Admin Dose 0.125 MG;  Start 2/12/19 at 13:00


Ergocalciferol (Drisdol) 50,000 unit Q7D PO  Last administered on 2/18/19at 


13:00; Admin Dose 50,000 UNIT;  Start 2/11/19 at 14:00


Ferrous Sulfate (Ferrous Sulfate (Ec)) 325 mg BID PO  Last administered on 


2/20/19at 08:13; Admin Dose 325 MG;  Start 2/11/19 at 21:00


Insulin Glargine (Lantus) 12 units QHS SC  Last administered on 2/19/19at 20:25;


Admin Dose 12 UNITS;  Start 2/11/19 at 21:00


Famotidine (Pepcid) 20 mg DAILY PO  Last administered on 2/20/19at 08:13; Admin 


Dose 20 MG;  Start 2/12/19 at 09:00


Atorvastatin Calcium (Lipitor) 80 mg HS PO  Last administered on 2/19/19at 


20:15; Admin Dose 80 MG;  Start 2/11/19 at 21:00


Diagnostic Test (Pha) (Accu-Chek) 1 ea 02 XX  Last administered on 2/20/19at 


01:50; Admin Dose 1 EA;  Start 2/12/19 at 02:00


Miscellaneous Information 1 ea NOTE XX ;  Start 2/11/19 at 14:30


Glucose (Glutose) 15 gm Q15M  PRN PO DECREASED GLUCOSE;  Start 2/11/19 at 14:30


Glucose (Glutose) 22.5 gm Q15M  PRN PO DECREASED GLUCOSE;  Start 2/11/19 at 


14:30


Dextrose (D50w Syringe) 25 ml Q15M  PRN IV DECREASED GLUCOSE;  Start 2/11/19 at 


14:30


Dextrose (D50w Syringe) 50 ml Q15M  PRN IV DECREASED GLUCOSE;  Start 2/11/19 at 


14:30


Glucagon (Glucagen) 1 mg Q15M  PRN IM DECREASED GLUCOSE;  Start 2/11/19 at 14:30


Glucose (Glutose) 15 gm Q15M  PRN BUCCAL DECREASED GLUCOSE Last administered on 


2/14/19at 08:36; Admin Dose 15 GM;  Start 2/11/19 at 14:30


Nicotine (Nicoderm 21 Mg/ 24hr) 1 patch DAILY TRANSDERM ;  Start 2/11/19 at 


16:30


Metoprolol Succinate (Toprol Xl) 25 mg DAILY PO  Last administered on 2/20/19at 


08:15; Admin Dose 25 MG;  Start 2/12/19 at 09:00


Patient Own Medication 1 ea BID PO  Last administered on 2/12/19at 08:09; Admin 


Dose 1 EA;  Start 2/11/19 at 21:00;  Status Hold


Insulin Aspart (Novolog Insulin Pen) (Adult SC Insulin - Mild Algorithm)... AC 


MEALS AND  BEDTIME SC  Last administered on 2/20/19at 08:27; Admin Dose 1 UNIT; 


Start 2/12/19 at 17:25


Miscellaneous Information Patients own medicat... BID@10,16 XX  Last 


administered on 2/15/19at 16:28; Admin Dose 1,600 EA;  Start 2/13/19 at 10:00


Doxazosin Mesylate (Cardura) 4 mg HS PO  Last administered on 2/15/19at 21:14; 


Admin Dose 4 MG;  Start 2/14/19 at 21:00


Trazodone HCl (Desyrel) 50 mg HS PO  Last administered on 2/18/19at 20:06; Admin


Dose 50 MG;  Start 2/16/19 at 00:30


Acetazolamide (Diamox) 500 mg DAILY IV  Last administered on 2/20/19at 08:13; 


Admin Dose 500 MG;  Start 2/18/19 at 09:00;  Stop 2/23/19 at 08:59


Senna (Senokot) 1 tab BID  PRN PO CONSTIPATION;  Start 2/19/19 at 13:30











JOSE YANG MD              Feb 20, 2019 10:52

## 2019-02-20 NOTE — CONS
Assessment/Plan


Assessment/Plan


Hospital Course (Demo Recall)


IMP:


1.CHF- systolic acute on chronic


2.Cardiomyopathy- EF 30%


3.CKD


4.HTN


5.Dyslipidemia


6. Hepatomegaly-likley due to passive congestion of liver





Recc:


-Tele


-Now on diamox for contraction alkalosis


-continue bb and cardura and if BP allows will start afterload reduction with 


hydralazine low dose


-Continue statin/Eliquis


-hold digoxin and check repeat level





Consultation Date/Type/Reason


Admit Date/Time


Feb 11, 2019 at 13:13


Initial Consult Date


2/11/19


Type of Consult


Cardiology


Reason for Consultation


cHF


Requesting Provider:  GORDO MCDERMOTT


Date/Time of Note


DATE: 2/20/19 


TIME: 15:06





Exam/Review of Systems


Vital Signs


Vitals





Vital Signs


  Date      Temp  Pulse  Resp  B/P (MAP)   Pulse Ox  O2          O2 Flow    FiO2


Time                                                 Delivery    Rate


   2/20/19           55


     13:25


   2/20/19  98.2               99/55 (70)        94  Nasal


     11:49                                           Cannula


   2/20/19                                                             2.0


     07:50


   2/20/19                 18


     07:34


   2/20/19                                                                    30


     01:40








Intake and Output





2/19/19 2/19/19 2/20/19





1515:00


23:00


07:00





IntakeIntake Total


600 ml


600 ml


800 ml





OutputOutput Total


1000 ml


1150 ml


900 ml





BalanceBalance


-400 ml


-550 ml


-100 ml














Exam


Exam


Review of Systems:


CONSTITUTIONAL:  No fevers, chills.


PULMONARY:  No sob


CARDIOVASCULAR: No chest pain/palpitations


GASTROINTESTINAL:  No nausea/vomiting.


GENITOURINARY:  No hematuria/dysuria.


MUSCULOSKELETAL:  No myagias/arthalgias.


PSYCHIATRIC:  The patient denies depression.


NEUROLOGIC:   No weakness


Constitutional:  alert


Psych:  no complaints


Head:  normocephalic


ENMT:  mucosa pink and moist


Neck:  supple, jvd (9 cm water)


Respiratory:  diminished breath sounds (at bases/B)


Cardiovascular:  regular rate and rhythm


Gastrointestinal:  soft, non-tender


Musculoskeletal:  muscle tone (normal)


Extremities:  edema (none)


Neurological:  other (No focal deficits)





Labs


Result Diagram:  


2/19/19 0609                                                                    


           2/20/19 0731





Results 24hrs





Laboratory Tests


Test
                 2/19/19
17:03  2/19/19
20:09  2/20/19
01:43  2/20/19
07:31


Bedside Glucose              260  H         239  H         239  H


Sodium Level                                                               139


Potassium Level                                                            4.0


Chloride Level                                                             89  L


Carbon Dioxide Level                                                      44  *H


Anion Gap                                                                   6  #


Blood Urea Nitrogen                                                        70  H


Creatinine                                                               2.69  H


Est Glomerular        
              
              
                     24  L



Filtrat Rate
mL/min


Glucose Level                                                              157


Calcium Level                                                              9.6


Test
                 2/20/19
08:12  2/20/19
11:37  
              



Bedside Glucose               147            199








Medications


Medications





Current Medications


IV Flush (NS 3 ml) 3 ml PER PROTOCOL IV ;  Start 2/11/19 at 14:00


Ondansetron HCl (Zofran Inj) 4 mg Q6H  PRN IV NAUSEA/VOMITING Last administered 


on 2/13/19at 04:47; Admin Dose 4 MG;  Start 2/11/19 at 14:00


Acetaminophen (Tylenol Tab) 650 mg Q6H  PRN PO .PAIN 1-3 OR TEMP Last 


administered on 2/20/19at 01:50; Admin Dose 650 MG;  Start 2/11/19 at 14:00


Acetaminophen/ Hydrocodone Bitart (Norco (5/325)) 1 tab Q6H  PRN PO .MOD PAIN 4-


6 Last administered on 2/12/19at 13:01; Admin Dose 1 TAB;  Start 2/11/19 at 


14:00


Morphine Sulfate (morphine) 6 mg Q4H  PRN PO .SEVERE PAIN 7-10;  Start 2/11/19 


at 14:00


Docusate Sodium (Colace) 100 mg Q12H  PRN PO .CONSTIPATION Last administered on 


2/18/19at 20:37; Admin Dose 100 MG;  Start 2/11/19 at 14:00


Magnesium Hydroxide (Milk Of Mag) 30 ml DAILY  PRN PO .CONSTIPATION Last 


administered on 2/18/19at 20:06; Admin Dose 30 ML;  Start 2/11/19 at 14:00


Lorazepam (Ativan) 0.5 mg Q6H  PRN PO ANXIETY;  Start 2/11/19 at 14:00


Albuterol/ Ipratropium (Duoneb) 3 ml Q4H RESP THERAPY  PRN HHN SHORTNESS OF PAWAN


ATH Last administered on 2/14/19at 23:35; Admin Dose 3 ML;  Start 2/11/19 at 


14:00


Hydralazine HCl (Apresoline) 10 mg Q6H  PRN IV ELEVATED BLOOD PRESSURE;  Start 


2/11/19 at 14:00


Nitroglycerin (Nitroglycerin (Sl Tab) 0.4 Mg) 1 tab Q5M  PRN SL ANGINA;  Start 


2/11/19 at 14:00


Apixaban (Eliquis) 5 mg BID PO  Last administered on 2/20/19at 08:14; Admin Dose


5 MG;  Start 2/11/19 at 21:00


Digoxin (Digoxin) 0.125 mg DAILY@1300 PO  Last administered on 2/20/19at 13:00; 


Admin Dose 0.125 MG;  Start 2/12/19 at 13:00


Ergocalciferol (Drisdol) 50,000 unit Q7D PO  Last administered on 2/18/19at 


13:00; Admin Dose 50,000 UNIT;  Start 2/11/19 at 14:00


Ferrous Sulfate (Ferrous Sulfate (Ec)) 325 mg BID PO  Last administered on 


2/20/19at 08:13; Admin Dose 325 MG;  Start 2/11/19 at 21:00


Insulin Glargine (Lantus) 12 units QHS SC  Last administered on 2/19/19at 20:25;


Admin Dose 12 UNITS;  Start 2/11/19 at 21:00


Famotidine (Pepcid) 20 mg DAILY PO  Last administered on 2/20/19at 08:13; Admin 


Dose 20 MG;  Start 2/12/19 at 09:00


Atorvastatin Calcium (Lipitor) 80 mg HS PO  Last administered on 2/19/19at 


20:15; Admin Dose 80 MG;  Start 2/11/19 at 21:00


Diagnostic Test (Pha) (Accu-Chek) 1 ea 02 XX  Last administered on 2/20/19at 


01:50; Admin Dose 1 EA;  Start 2/12/19 at 02:00


Miscellaneous Information 1 ea NOTE XX ;  Start 2/11/19 at 14:30


Glucose (Glutose) 15 gm Q15M  PRN PO DECREASED GLUCOSE;  Start 2/11/19 at 14:30


Glucose (Glutose) 22.5 gm Q15M  PRN PO DECREASED GLUCOSE;  Start 2/11/19 at 


14:30


Dextrose (D50w Syringe) 25 ml Q15M  PRN IV DECREASED GLUCOSE;  Start 2/11/19 at 


14:30


Dextrose (D50w Syringe) 50 ml Q15M  PRN IV DECREASED GLUCOSE;  Start 2/11/19 at 


14:30


Glucagon (Glucagen) 1 mg Q15M  PRN IM DECREASED GLUCOSE;  Start 2/11/19 at 14:30


Glucose (Glutose) 15 gm Q15M  PRN BUCCAL DECREASED GLUCOSE Last administered on 


2/14/19at 08:36; Admin Dose 15 GM;  Start 2/11/19 at 14:30


Nicotine (Nicoderm 21 Mg/ 24hr) 1 patch DAILY TRANSDERM ;  Start 2/11/19 at 


16:30


Metoprolol Succinate (Toprol Xl) 25 mg DAILY PO  Last administered on 2/20/19at 


08:15; Admin Dose 25 MG;  Start 2/12/19 at 09:00


Patient Own Medication 1 ea BID PO  Last administered on 2/12/19at 08:09; Admin 


Dose 1 EA;  Start 2/11/19 at 21:00;  Status Hold


Insulin Aspart (Novolog Insulin Pen) (Adult SC Insulin - Mild Algorithm)... AC 


MEALS AND  BEDTIME SC  Last administered on 2/20/19at 11:43; Admin Dose 2 UNIT; 


Start 2/12/19 at 17:25


Miscellaneous Information Patients own medicat... BID@10,16 XX  Last 


administered on 2/15/19at 16:28; Admin Dose 1,600 EA;  Start 2/13/19 at 10:00


Doxazosin Mesylate (Cardura) 4 mg HS PO  Last administered on 2/15/19at 21:14; 


Admin Dose 4 MG;  Start 2/14/19 at 21:00


Trazodone HCl (Desyrel) 50 mg HS PO  Last administered on 2/18/19at 20:06; Admin


Dose 50 MG;  Start 2/16/19 at 00:30


Senna (Senokot) 1 tab BID  PRN PO CONSTIPATION;  Start 2/19/19 at 13:30


Acetazolamide (Diamox) 500 mg Q12 IV ;  Start 2/20/19 at 21:00











ROSANNE WERNER               Feb 20, 2019 15:08

## 2019-02-20 NOTE — PN
Date/Time of Note


Date/Time of Note


DATE: 2/20/19 


TIME: 16:36





Assessment/Plan


VTE Prophylaxis


Risk score (from Ns)>0 risk:  4


SCD applied (from Ns):  No


SCD contraindicated:  other (no)


Pharmacological prophylaxis:  apixaban





Lines/Catheters


IV Catheter Type (from [facility]):  Saline Lock


Urinary Cath still in place:  No





Assessment/Plan


Assessment/Plan


68-year-old man coming in with chest pressure, shortness of breath, lower 


extremity swelling, signs of congestive heart failure exacerbation.





1.  Shortness of breath and chest pressure 


   -overall slowly improving now.


   -Continue fluid restriction as recommended by cardiology and renal teams. 


   -Currently holding diuresis due to severe contraction alkalosis. 


   - follow up Cardiology recommendations and continue digoxin for now.


   -Off of Entresto presently while in-house


2. Acute on chronic kidney disease.  Again, the kidney function appears to be 


slightly worse than when he was here 2 months ago.  Creatinine levels have been 


in the low to mid 2 range.  Patient having adequate urine output.


   -Follow-up recommendations from renal consult


   -Continue to carefully monitor BUN and creatinine levels and urine output. 


3.  Smoking history.  


   -Counseled on cessation. 


   -Continue nicotine patch.


4.  History of hypertension.  Again, see above.  


   - Continue current medications.  


   - Also hydralazine p.r.n.


5.  History of atrial fibrillation -appears stable


   -Monitor, continue Eliquis as well.


6.  Type 2 diabetes.  A1c equals 9.0, sugars presently stable 


   - continue Lantus and sliding scale insulin.


7.  Deep venous thrombosis prophylaxis-on Eliquis.





Dispo: Currently hospitalized for severe metabolic alkalosis


Result Diagram:  


2/19/19 0609                                                                    


           2/20/19 0731





Results 24hrs





Laboratory Tests


Test
                 2/19/19
17:03  2/19/19
20:09  2/20/19
01:43  2/20/19
07:31


Bedside Glucose              260  H         239  H         239  H


Sodium Level                                                               139


Potassium Level                                                            4.0


Chloride Level                                                             89  L


Carbon Dioxide Level                                                      44  *H


Anion Gap                                                                   6  #


Blood Urea Nitrogen                                                        70  H


Creatinine                                                               2.69  H


Est Glomerular        
              
              
                     24  L



Filtrat Rate
mL/min


Glucose Level                                                              157


Calcium Level                                                              9.6


Test
                 2/20/19
08:12  2/20/19
11:37  2/20/19
15:16  



Bedside Glucose               147            199


Digoxin Level                                               1.1








Subjective


24 Hr Interval Summary


Free Text/Dictation


No acute overnight events. Patient has no complaints.





Exam/Review of Systems


Exam


Vitals





Vital Signs


  Date      Temp  Pulse  Resp  B/P (MAP)   Pulse Ox  O2          O2 Flow    FiO2


Time                                                 Delivery    Rate


   2/20/19           60


     16:19


   2/20/19  98.2           18      102/72        96  Nasal


     15:33                           (82)            Cannula


   2/20/19                                                             2.0


     07:50


   2/20/19                                                                    30


     01:40








Intake and Output





2/19/19 2/19/19 2/20/19





1515:00


23:00


07:00





IntakeIntake Total


600 ml


600 ml


800 ml





OutputOutput Total


1000 ml


1150 ml


900 ml





BalanceBalance


-400 ml


-550 ml


-100 ml











Exam


GENERAL:  lying in bed, sleeping comfortably. 


HEENT:  Pupils equal, round and reactive to light.  Extraocular muscles are 


intact.


NECK:  Supple, no thyromegaly.


LUNGS:  Distant breath sounds bilaterally, no crackles appreciated. 


CARDIOVASCULAR:  Irregularly irregular heart rate.  No rubs or gallops.


ABDOMEN:  Soft, nontender and nondistended.  Normal bowel sounds.  No rebound or


guarding.


MUSCULOSKELETAL:  1+ pitting edema in bilateral lower extremities to the mid 


calves.





Results


Results 24hrs





Laboratory Tests


Test
                 2/19/19
17:03  2/19/19
20:09  2/20/19
01:43  2/20/19
07:31


Bedside Glucose              260  H         239  H         239  H


Sodium Level                                                               139


Potassium Level                                                            4.0


Chloride Level                                                             89  L


Carbon Dioxide Level                                                      44  *H


Anion Gap                                                                   6  #


Blood Urea Nitrogen                                                        70  H


Creatinine                                                               2.69  H


Est Glomerular        
              
              
                     24  L



Filtrat Rate
mL/min


Glucose Level                                                              157


Calcium Level                                                              9.6


Test
                 2/20/19
08:12  2/20/19
11:37  2/20/19
15:16  



Bedside Glucose               147            199


Digoxin Level                                               1.1








Medications


Medication





Current Medications


IV Flush (NS 3 ml) 3 ml PER PROTOCOL IV ;  Start 2/11/19 at 14:00


Ondansetron HCl (Zofran Inj) 4 mg Q6H  PRN IV NAUSEA/VOMITING Last administered 


on 2/13/19at 04:47; Admin Dose 4 MG;  Start 2/11/19 at 14:00


Acetaminophen (Tylenol Tab) 650 mg Q6H  PRN PO .PAIN 1-3 OR TEMP Last administer


ed on 2/20/19at 01:50; Admin Dose 650 MG;  Start 2/11/19 at 14:00


Acetaminophen/ Hydrocodone Bitart (Norco (5/325)) 1 tab Q6H  PRN PO .MOD PAIN 4-


6 Last administered on 2/12/19at 13:01; Admin Dose 1 TAB;  Start 2/11/19 at 


14:00


Morphine Sulfate (morphine) 6 mg Q4H  PRN PO .SEVERE PAIN 7-10;  Start 2/11/19 


at 14:00


Docusate Sodium (Colace) 100 mg Q12H  PRN PO .CONSTIPATION Last administered on 


2/18/19at 20:37; Admin Dose 100 MG;  Start 2/11/19 at 14:00


Magnesium Hydroxide (Milk Of Mag) 30 ml DAILY  PRN PO .CONSTIPATION Last 


administered on 2/18/19at 20:06; Admin Dose 30 ML;  Start 2/11/19 at 14:00


Lorazepam (Ativan) 0.5 mg Q6H  PRN PO ANXIETY;  Start 2/11/19 at 14:00


Albuterol/ Ipratropium (Duoneb) 3 ml Q4H RESP THERAPY  PRN HHN SHORTNESS OF 


BREATH Last administered on 2/14/19at 23:35; Admin Dose 3 ML;  Start 2/11/19 at 


14:00


Hydralazine HCl (Apresoline) 10 mg Q6H  PRN IV ELEVATED BLOOD PRESSURE;  Start 


2/11/19 at 14:00


Nitroglycerin (Nitroglycerin (Sl Tab) 0.4 Mg) 1 tab Q5M  PRN SL ANGINA;  Start 


2/11/19 at 14:00


Apixaban (Eliquis) 5 mg BID PO  Last administered on 2/20/19at 08:14; Admin Dose


5 MG;  Start 2/11/19 at 21:00


Digoxin (Digoxin) 0.125 mg DAILY@1300 PO  Last administered on 2/20/19at 13:00; 


Admin Dose 0.125 MG;  Start 2/12/19 at 13:00;  Status Hold


Ergocalciferol (Drisdol) 50,000 unit Q7D PO  Last administered on 2/18/19at 


13:00; Admin Dose 50,000 UNIT;  Start 2/11/19 at 14:00


Ferrous Sulfate (Ferrous Sulfate (Ec)) 325 mg BID PO  Last administered on 


2/20/19at 08:13; Admin Dose 325 MG;  Start 2/11/19 at 21:00


Insulin Glargine (Lantus) 12 units QHS SC  Last administered on 2/19/19at 20:25;


Admin Dose 12 UNITS;  Start 2/11/19 at 21:00


Famotidine (Pepcid) 20 mg DAILY PO  Last administered on 2/20/19at 08:13; Admin 


Dose 20 MG;  Start 2/12/19 at 09:00


Atorvastatin Calcium (Lipitor) 80 mg HS PO  Last administered on 2/19/19at 


20:15; Admin Dose 80 MG;  Start 2/11/19 at 21:00


Diagnostic Test (Pha) (Accu-Chek) 1 ea 02 XX  Last administered on 2/20/19at 


01:50; Admin Dose 1 EA;  Start 2/12/19 at 02:00


Miscellaneous Information 1 ea NOTE XX ;  Start 2/11/19 at 14:30


Glucose (Glutose) 15 gm Q15M  PRN PO DECREASED GLUCOSE;  Start 2/11/19 at 14:30


Glucose (Glutose) 22.5 gm Q15M  PRN PO DECREASED GLUCOSE;  Start 2/11/19 at 


14:30


Dextrose (D50w Syringe) 25 ml Q15M  PRN IV DECREASED GLUCOSE;  Start 2/11/19 at 


14:30


Dextrose (D50w Syringe) 50 ml Q15M  PRN IV DECREASED GLUCOSE;  Start 2/11/19 at 


14:30


Glucagon (Glucagen) 1 mg Q15M  PRN IM DECREASED GLUCOSE;  Start 2/11/19 at 14:30


Glucose (Glutose) 15 gm Q15M  PRN BUCCAL DECREASED GLUCOSE Last administered on 


2/14/19at 08:36; Admin Dose 15 GM;  Start 2/11/19 at 14:30


Nicotine (Nicoderm 21 Mg/ 24hr) 1 patch DAILY TRANSDERM ;  Start 2/11/19 at 


16:30


Metoprolol Succinate (Toprol Xl) 25 mg DAILY PO  Last administered on 2/20/19at 


08:15; Admin Dose 25 MG;  Start 2/12/19 at 09:00


Patient Own Medication 1 ea BID PO  Last administered on 2/12/19at 08:09; Admin 


Dose 1 EA;  Start 2/11/19 at 21:00;  Status Hold


Insulin Aspart (Novolog Insulin Pen) (Adult SC Insulin - Mild Algorithm)... AC 


MEALS AND  BEDTIME SC  Last administered on 2/20/19at 11:43; Admin Dose 2 UNIT; 


Start 2/12/19 at 17:25


Miscellaneous Information Patients own medicat... BID@10,16 XX  Last 


administered on 2/15/19at 16:28; Admin Dose 1,600 EA;  Start 2/13/19 at 10:00


Doxazosin Mesylate (Cardura) 4 mg HS PO  Last administered on 2/15/19at 21:14; 


Admin Dose 4 MG;  Start 2/14/19 at 21:00


Trazodone HCl (Desyrel) 50 mg HS PO  Last administered on 2/18/19at 20:06; Admin


Dose 50 MG;  Start 2/16/19 at 00:30


Senna (Senokot) 1 tab BID  PRN PO CONSTIPATION;  Start 2/19/19 at 13:30


Acetazolamide (Diamox) 500 mg Q12 IV ;  Start 2/20/19 at 21:00











ROSANNE HERNÁNDEZ MD              Feb 20, 2019 16:39

## 2019-02-21 VITALS — HEART RATE: 58 BPM

## 2019-02-21 VITALS — RESPIRATION RATE: 20 BRPM

## 2019-02-21 VITALS — HEART RATE: 100 BPM

## 2019-02-21 VITALS — HEART RATE: 67 BPM | RESPIRATION RATE: 19 BRPM | DIASTOLIC BLOOD PRESSURE: 58 MMHG | SYSTOLIC BLOOD PRESSURE: 97 MMHG

## 2019-02-21 VITALS — HEART RATE: 99 BPM | SYSTOLIC BLOOD PRESSURE: 102 MMHG | DIASTOLIC BLOOD PRESSURE: 48 MMHG | RESPIRATION RATE: 21 BRPM

## 2019-02-21 VITALS — HEART RATE: 55 BPM

## 2019-02-21 VITALS — DIASTOLIC BLOOD PRESSURE: 50 MMHG | HEART RATE: 56 BPM | RESPIRATION RATE: 20 BRPM | SYSTOLIC BLOOD PRESSURE: 98 MMHG

## 2019-02-21 VITALS — HEART RATE: 65 BPM | DIASTOLIC BLOOD PRESSURE: 60 MMHG | RESPIRATION RATE: 17 BRPM | SYSTOLIC BLOOD PRESSURE: 103 MMHG

## 2019-02-21 VITALS — HEART RATE: 54 BPM | RESPIRATION RATE: 20 BRPM | DIASTOLIC BLOOD PRESSURE: 58 MMHG | SYSTOLIC BLOOD PRESSURE: 104 MMHG

## 2019-02-21 VITALS — HEART RATE: 54 BPM

## 2019-02-21 VITALS — SYSTOLIC BLOOD PRESSURE: 115 MMHG | HEART RATE: 61 BPM | RESPIRATION RATE: 20 BRPM | DIASTOLIC BLOOD PRESSURE: 58 MMHG

## 2019-02-21 LAB
ADD MAN DIFF?: NO
ANION GAP: 9 (ref 5–13)
ARTERIAL BASE EXCESS: 10.1 MMOL/L (ref -3–3)
ARTERIAL BLOOD GAS OXYGEN SAT: 93.6 MMHG (ref 95–98)
ARTERIAL COHB: 0.6 % (ref 0–3)
ARTERIAL FRACTION OF OXYHGB: 92.8 % (ref 93–99)
ARTERIAL HCO3: 36.1 MMOL/L (ref 22–26)
ARTERIAL METHB: 0.3 % (ref 0–1.5)
ARTERIAL PCO2: 54.5 MMHG (ref 35–45)
BASOPHIL #: 0.1 10^3/UL (ref 0–0.1)
BASOPHILS %: 0.9 % (ref 0–2)
BLOOD UREA NITROGEN: 72 MG/DL (ref 7–20)
CALCIUM: 9.6 MG/DL (ref 8.4–10.2)
CARBON DIOXIDE: 41 MMOL/L (ref 21–31)
CHLORIDE: 90 MMOL/L (ref 97–110)
CREATININE: 2.6 MG/DL (ref 0.61–1.24)
EOSINOPHILS #: 0.2 10^3/UL (ref 0–0.5)
EOSINOPHILS %: 3.7 % (ref 0–7)
FIO2: 30 %
GLUCOSE: 173 MG/DL (ref 70–220)
HEMATOCRIT: 41.1 % (ref 42–52)
HEMOGLOBIN: 12.2 G/DL (ref 14–18)
IMMATURE GRANS #M: 0.02 10^3/UL (ref 0–0.03)
IMMATURE GRANS % (M): 0.3 % (ref 0–0.43)
LYMPHOCYTES #: 1.4 10^3/UL (ref 0.8–2.9)
LYMPHOCYTES %: 21.8 % (ref 15–51)
MEAN CORPUSCULAR HEMOGLOBIN: 24.3 PG (ref 29–33)
MEAN CORPUSCULAR HGB CONC: 29.7 G/DL (ref 32–37)
MEAN CORPUSCULAR VOLUME: 81.7 FL (ref 82–101)
MODE: (no result)
MONOCYTE #: 0.7 10^3/UL (ref 0.3–0.9)
MONOCYTES %: 10.9 % (ref 0–11)
NEUTROPHIL #: 4 10^3/UL (ref 1.6–7.5)
NEUTROPHILS %: 62.4 % (ref 39–77)
NUCLEATED RED BLOOD CELLS #: 0 10^3/UL (ref 0–0)
NUCLEATED RED BLOOD CELLS%: 0 /100WBC (ref 0–0)
O2 A-A PPRESDIFF RESPIRATORY: 85.7 MMHG (ref 7–24)
PLATELET COUNT: 167 10^3/UL (ref 140–415)
POTASSIUM: 3.8 MMOL/L (ref 3.5–5.1)
RED BLOOD COUNT: 5.03 10^6/UL (ref 4.7–6.1)
RED CELL DISTRIBUTION WIDTH: 18.1 % (ref 11.5–14.5)
SODIUM: 140 MMOL/L (ref 135–144)
WHITE BLOOD COUNT: 6.4 10^3/UL (ref 4.8–10.8)

## 2019-02-21 RX ADMIN — NICOTINE SCH PATCH: 21 PATCH, EXTENDED RELEASE TRANSDERMAL at 10:13

## 2019-02-21 RX ADMIN — FERROUS SULFATE TAB 325 MG (65 MG ELEMENTAL FE) 1 MG: 325 (65 FE) TAB at 21:50

## 2019-02-21 RX ADMIN — NICOTINE 1 PATCH: 21 PATCH, EXTENDED RELEASE TRANSDERMAL at 10:13

## 2019-02-21 RX ADMIN — FERROUS SULFATE TAB 325 MG (65 MG ELEMENTAL FE) SCH MG: 325 (65 FE) TAB at 10:09

## 2019-02-21 RX ADMIN — APIXABAN 1 MG: 5 TABLET, FILM COATED ORAL at 10:09

## 2019-02-21 RX ADMIN — DEXAMETHASONE SODIUM PHOSPHATE PRN MG: 10 INJECTION, SOLUTION INTRAMUSCULAR; INTRAVENOUS at 22:45

## 2019-02-21 RX ADMIN — ACETAMINOPHEN 1 MG: 325 TABLET, FILM COATED ORAL at 06:56

## 2019-02-21 RX ADMIN — ATORVASTATIN CALCIUM 1 MG: 80 TABLET, FILM COATED ORAL at 21:50

## 2019-02-21 RX ADMIN — ACETAZOLAMIDE SODIUM 1 MG: 500 INJECTION, POWDER, LYOPHILIZED, FOR SOLUTION INTRAVENOUS at 21:49

## 2019-02-21 RX ADMIN — INSULIN ASPART 1 UNIT: 100 INJECTION, SOLUTION INTRAVENOUS; SUBCUTANEOUS at 22:13

## 2019-02-21 RX ADMIN — APIXABAN SCH MG: 5 TABLET, FILM COATED ORAL at 21:50

## 2019-02-21 RX ADMIN — INSULIN ASPART 1 UNIT: 100 INJECTION, SOLUTION INTRAVENOUS; SUBCUTANEOUS at 08:54

## 2019-02-21 RX ADMIN — METOPROLOL SUCCINATE SCH MG: 25 TABLET, EXTENDED RELEASE ORAL at 10:09

## 2019-02-21 RX ADMIN — NICOTINE 1 PATCH: 21 PATCH, EXTENDED RELEASE TRANSDERMAL at 10:08

## 2019-02-21 RX ADMIN — DOXAZOSIN SCH MG: 4 TABLET ORAL at 21:00

## 2019-02-21 RX ADMIN — FERROUS SULFATE TAB 325 MG (65 MG ELEMENTAL FE) SCH MG: 325 (65 FE) TAB at 21:50

## 2019-02-21 RX ADMIN — DOXAZOSIN 1 MG: 4 TABLET ORAL at 21:00

## 2019-02-21 RX ADMIN — NICOTINE SCH PATCH: 21 PATCH, EXTENDED RELEASE TRANSDERMAL at 10:08

## 2019-02-21 RX ADMIN — INSULIN ASPART 1 UNIT: 100 INJECTION, SOLUTION INTRAVENOUS; SUBCUTANEOUS at 17:40

## 2019-02-21 RX ADMIN — ACETAZOLAMIDE SODIUM SCH MG: 500 INJECTION, POWDER, LYOPHILIZED, FOR SOLUTION INTRAVENOUS at 21:49

## 2019-02-21 RX ADMIN — FAMOTIDINE 1 MG: 20 TABLET ORAL at 10:09

## 2019-02-21 RX ADMIN — APIXABAN SCH MG: 5 TABLET, FILM COATED ORAL at 10:09

## 2019-02-21 RX ADMIN — BACLOFEN 1 MG: 10 TABLET ORAL at 10:08

## 2019-02-21 RX ADMIN — APIXABAN 1 MG: 5 TABLET, FILM COATED ORAL at 21:50

## 2019-02-21 RX ADMIN — ONDANSETRON HYDROCHLORIDE 1 MG: 2 INJECTION, SOLUTION INTRAMUSCULAR; INTRAVENOUS at 22:45

## 2019-02-21 RX ADMIN — INSULIN GLARGINE 1 UNITS: 100 INJECTION, SOLUTION SUBCUTANEOUS at 22:13

## 2019-02-21 RX ADMIN — METOPROLOL SUCCINATE 1 MG: 25 TABLET, EXTENDED RELEASE ORAL at 10:09

## 2019-02-21 RX ADMIN — INSULIN GLARGINE SCH UNITS: 100 INJECTION, SOLUTION SUBCUTANEOUS at 22:13

## 2019-02-21 RX ADMIN — INSULIN ASPART 1 UNIT: 100 INJECTION, SOLUTION INTRAVENOUS; SUBCUTANEOUS at 13:40

## 2019-02-21 RX ADMIN — ACETAZOLAMIDE SODIUM SCH MG: 500 INJECTION, POWDER, LYOPHILIZED, FOR SOLUTION INTRAVENOUS at 10:10

## 2019-02-21 RX ADMIN — FAMOTIDINE SCH MG: 20 TABLET ORAL at 10:09

## 2019-02-21 RX ADMIN — FERROUS SULFATE TAB 325 MG (65 MG ELEMENTAL FE) 1 MG: 325 (65 FE) TAB at 10:09

## 2019-02-21 RX ADMIN — ATORVASTATIN CALCIUM SCH MG: 80 TABLET, FILM COATED ORAL at 21:50

## 2019-02-21 RX ADMIN — ACETAZOLAMIDE SODIUM 1 MG: 500 INJECTION, POWDER, LYOPHILIZED, FOR SOLUTION INTRAVENOUS at 10:10

## 2019-02-21 NOTE — CONS
Assessment/Plan


Assessment/Plan


Assessment/Plan (Daily)


A 68-year-old male presenting with:


1.  Shortness of breath with bilateral lower extremity edema, orthopnea and PND 


likely secondary to acute on chronic congestive heart failure.


2.  Acute on chronic renal failure; however, the patient's baseline creatinine 


ranges from 1.9 to 2.  On last discharge in December, the creatinine was 1.9.  


This could be all secondary to cardiorenal as the patient is clearly in 


decompensated heart failure right now.  now reached a euvolemic state, pt had 


Renal U/S in 12/18 which showed chronci kidney disease. Cr 2.23 >2.39>2.69


3.  History of atrial fibrillation.


4.  Hypertension.


5.  Diabetes.


6.  Hyperlipidemia.


7.  BPH.


8.  Positive troponin with chest pain ? NSTEMI


9.  Elevated BNP.


10  Contraction alkalosis with worsening uremia today worsening bicarb 


11 hypoxia on ABG due to CHF





 


PLAN:


-Hold lasix due to contraction alkalosis


- iv diamox 2 doses today , monitor levels tmw


- cw  hold off Entresto at this point.


-  Digoxin levels normal.


-. Keep a systolic blood pressure of greater than 100.


-   Renally dose all meds.


-   Avoid nephrotoxic agents.





recheck bmp tmw pending improvement in bicarb status





Consultation Date/Type/Reason


Admit Date/Time


Feb 11, 2019 at 13:13


Initial Consult Date





Requesting Provider:  GORDO MCDERMOTT


Date/Time of Note


DATE: 2/21/19 


TIME: 15:35





24 HR Interval Summary


Free Text/Dictation


Carb is 41


And is drinking a lot of water





Exam/Review of Systems


Exam


Vitals





Vital Signs


  Date      Temp  Pulse  Resp  B/P (MAP)   Pulse Ox  O2          O2 Flow    FiO2


Time                                                 Delivery    Rate


   2/21/19  97.7     56    20  98/50 (66)        98  Nasal


     15:33                                           Cannula


   2/21/19                                                             3.0


     13:36


   2/20/19                                                                    30


     01:40








Intake and Output





2/20/19 2/20/19 2/21/19





1515:00


23:00


07:00





IntakeIntake Total


800 ml


1000 ml





OutputOutput Total


400 ml





BalanceBalance


800 ml


600 ml











Exam


Respiratory:  diminished breath sounds


Cardiovascular:  regular rate and rhythm, JHONATAN


Gastrointestinal:  soft


Musculoskeletal:  nl extremities to inspection


Extremities:  normal pulses


+ Edema improved





Results


Result Diagram:  


2/21/19 0849                                                                    


           2/21/19 0849





Results 24hrs





Laboratory Tests


Test
                 2/20/19
16:54  2/20/19
20:48  2/21/19
08:45  2/21/19
08:49


Bedside Glucose              287  H          174            163


White Blood Count                                                          6.4


Red Blood Count                                                           5.03


Hemoglobin                                                               12.2  L


Hematocrit                                                               41.1  L


Mean Corpuscular                                                         81.7  L


Volume


Mean Corpuscular                                                         24.3  L


Hemoglobin


Mean Corpuscular      
              
              
                   29.7  L



Hemoglobin
Concent


Red Cell                                                                 18.1  H


Distribution Width


Platelet Count                                                             167


Mean Platelet Volume


Immature                                                                 0.300


Granulocytes %


Neutrophils %                                                             62.4


Lymphocytes %                                                             21.8


Monocytes %                                                               10.9


Eosinophils %                                                              3.7


Basophils %                                                                0.9


Nucleated Red Blood                                                        0.0


Cells %


Immature                                                                 0.020


Granulocytes #


Neutrophils #                                                              4.0


Lymphocytes #                                                              1.4


Monocytes #                                                                0.7


Eosinophils #                                                              0.2


Basophils #                                                                0.1


Nucleated Red Blood                                                        0.0


Cells #


Sodium Level                                                               140


Potassium Level                                                            3.8


Chloride Level                                                             90  L


Carbon Dioxide Level                                                      41  *H


Anion Gap                                                                    9


Blood Urea Nitrogen                                                        72  H


Creatinine                                                               2.60  H


Est Glomerular        
              
              
                     25  L



Filtrat Rate
mL/min


Glucose Level                                                              173


Calcium Level                                                              9.6


Test
                 2/21/19
12:11  
              
              



Bedside Glucose               202








Medications


Medication





Current Medications


IV Flush (NS 3 ml) 3 ml PER PROTOCOL IV ;  Start 2/11/19 at 14:00


Ondansetron HCl (Zofran Inj) 4 mg Q6H  PRN IV NAUSEA/VOMITING Last administered 


on 2/13/19at 04:47; Admin Dose 4 MG;  Start 2/11/19 at 14:00


Acetaminophen (Tylenol Tab) 650 mg Q6H  PRN PO .PAIN 1-3 OR TEMP Last 


administered on 2/21/19at 06:56; Admin Dose 650 MG;  Start 2/11/19 at 14:00


Acetaminophen/ Hydrocodone Bitart (Norco (5/325)) 1 tab Q6H  PRN PO .MOD PAIN 4-


6 Last administered on 2/12/19at 13:01; Admin Dose 1 TAB;  Start 2/11/19 at 


14:00


Morphine Sulfate (morphine) 6 mg Q4H  PRN PO .SEVERE PAIN 7-10;  Start 2/11/19 


at 14:00


Docusate Sodium (Colace) 100 mg Q12H  PRN PO .CONSTIPATION Last administered on 


2/18/19at 20:37; Admin Dose 100 MG;  Start 2/11/19 at 14:00


Magnesium Hydroxide (Milk Of Mag) 30 ml DAILY  PRN PO .CONSTIPATION Last 


administered on 2/18/19at 20:06; Admin Dose 30 ML;  Start 2/11/19 at 14:00


Lorazepam (Ativan) 0.5 mg Q6H  PRN PO ANXIETY;  Start 2/11/19 at 14:00


Albuterol/ Ipratropium (Duoneb) 3 ml Q4H RESP THERAPY  PRN HHN SHORTNESS OF 


BREATH Last administered on 2/14/19at 23:35; Admin Dose 3 ML;  Start 2/11/19 at 


14:00


Hydralazine HCl (Apresoline) 10 mg Q6H  PRN IV ELEVATED BLOOD PRESSURE;  Start 


2/11/19 at 14:00


Nitroglycerin (Nitroglycerin (Sl Tab) 0.4 Mg) 1 tab Q5M  PRN SL ANGINA;  Start 


2/11/19 at 14:00


Apixaban (Eliquis) 5 mg BID PO  Last administered on 2/21/19at 10:09; Admin Dose


5 MG;  Start 2/11/19 at 21:00


Digoxin (Digoxin) 0.125 mg DAILY@1300 PO  Last administered on 2/20/19at 13:00; 


Admin Dose 0.125 MG;  Start 2/12/19 at 13:00;  Status Hold


Ergocalciferol (Drisdol) 50,000 unit Q7D PO  Last administered on 2/18/19at 


13:00; Admin Dose 50,000 UNIT;  Start 2/11/19 at 14:00


Ferrous Sulfate (Ferrous Sulfate (Ec)) 325 mg BID PO  Last administered on 


2/21/19at 10:09; Admin Dose 325 MG;  Start 2/11/19 at 21:00


Insulin Glargine (Lantus) 12 units QHS SC  Last administered on 2/20/19at 21:03;


Admin Dose 12 UNITS;  Start 2/11/19 at 21:00


Famotidine (Pepcid) 20 mg DAILY PO  Last administered on 2/21/19at 10:09; Admin 


Dose 20 MG;  Start 2/12/19 at 09:00


Atorvastatin Calcium (Lipitor) 80 mg HS PO  Last administered on 2/20/19at 


20:52; Admin Dose 80 MG;  Start 2/11/19 at 21:00


Diagnostic Test (Pha) (Accu-Chek) 1 ea 02 XX  Last administered on 2/20/19at 


01:50; Admin Dose 1 EA;  Start 2/12/19 at 02:00


Miscellaneous Information 1 ea NOTE XX ;  Start 2/11/19 at 14:30


Glucose (Glutose) 15 gm Q15M  PRN PO DECREASED GLUCOSE;  Start 2/11/19 at 14:30


Glucose (Glutose) 22.5 gm Q15M  PRN PO DECREASED GLUCOSE;  Start 2/11/19 at 


14:30


Dextrose (D50w Syringe) 25 ml Q15M  PRN IV DECREASED GLUCOSE;  Start 2/11/19 at 


14:30


Dextrose (D50w Syringe) 50 ml Q15M  PRN IV DECREASED GLUCOSE;  Start 2/11/19 at 


14:30


Glucagon (Glucagen) 1 mg Q15M  PRN IM DECREASED GLUCOSE;  Start 2/11/19 at 14:30


Glucose (Glutose) 15 gm Q15M  PRN BUCCAL DECREASED GLUCOSE Last administered on 


2/14/19at 08:36; Admin Dose 15 GM;  Start 2/11/19 at 14:30


Nicotine (Nicoderm 21 Mg/ 24hr) 1 patch DAILY TRANSDERM ;  Start 2/11/19 at 


16:30


Metoprolol Succinate (Toprol Xl) 25 mg DAILY PO  Last administered on 2/21/19at 


10:09; Admin Dose 25 MG;  Start 2/12/19 at 09:00


Patient Own Medication 1 ea BID PO  Last administered on 2/12/19at 08:09; Admin 


Dose 1 EA;  Start 2/11/19 at 21:00;  Status Hold


Insulin Aspart (Novolog Insulin Pen) (Adult SC Insulin - Mild Algorithm)... AC 


MEALS AND  BEDTIME SC  Last administered on 2/21/19at 13:40; Admin Dose 2 UNIT; 


Start 2/12/19 at 17:25


Miscellaneous Information Patients own medicat... BID@10,16 XX  Last 


administered on 2/15/19at 16:28; Admin Dose 1,600 EA;  Start 2/13/19 at 10:00


Doxazosin Mesylate (Cardura) 4 mg HS PO  Last administered on 2/15/19at 21:14; 


Admin Dose 4 MG;  Start 2/14/19 at 21:00


Trazodone HCl (Desyrel) 50 mg HS PO  Last administered on 2/20/19at 20:51; Admin


Dose 50 MG;  Start 2/16/19 at 00:30


Senna (Senokot) 1 tab BID  PRN PO CONSTIPATION;  Start 2/19/19 at 13:30


Acetazolamide (Diamox) 500 mg Q12 IV  Last administered on 2/21/19at 10:10; 


Admin Dose 500 MG;  Start 2/20/19 at 21:00


Baclofen (Lioresal) 20 mg TID  PRN PO back spasm Last administered on 2/21/19at 


10:08; Admin Dose 20 MG;  Start 2/21/19 at 10:00











JOSE YANG MD              Feb 21, 2019 15:37

## 2019-02-21 NOTE — PN
Date/Time of Note


Date/Time of Note


DATE: 2/21/19 


TIME: 14:57





Assessment/Plan


VTE Prophylaxis


Risk score (from Ns)>0 risk:  4


SCD applied (from Ns):  No


SCD contraindicated:  other (no)


Pharmacological prophylaxis:  apixaban





Lines/Catheters


[facility]  Saline Lock


Urinary Cath still in place:  No





Assessment/Plan


Assessment/Plan


68-year-old man coming in with chest pressure, shortness of breath, lower 


extremity swelling, signs of congestive heart failure exacerbation.





1.  Shortness of breath and chest pressure 


   -overall slowly improving now.


   -Continue fluid restriction as recommended by cardiology and renal teams. 


   -Currently holding diuresis due to severe contraction alkalosis. 


   - follow up Cardiology recommendations and continue digoxin for now.


   -Off of Entresto presently while in-house


2. Acute on chronic kidney disease.  Again, the kidney function appears to be 


slightly worse than when he was here 2 months ago.  Creatinine levels have been 


in the low to mid 2 range.  Patient having adequate urine output.


   -Follow-up recommendations from renal consult


   -Continue to carefully monitor BUN and creatinine levels and urine output. 


3.  Smoking history.  


   -Counseled on cessation. 


   -Continue nicotine patch.


4.  History of hypertension.  Again, see above.  


   - Continue current medications.  


   - Also hydralazine p.r.n.


5.  History of atrial fibrillation -appears stable


   -Monitor, continue Eliquis as well.


6.  Type 2 diabetes.  A1c equals 9.0, sugars presently stable 


   - continue Lantus and sliding scale insulin.


7.  Deep venous thrombosis prophylaxis-on Eliquis.





Dispo: Currently hospitalized for severe metabolic alkalosis, anticipate 


discharge in 24-48 hours


Result Diagram:  


2/21/19 0849                                                                    


           2/21/19 0849








Subjective


24 Hr Interval Summary


Free Text/Dictation


Patient doing well, no acute overnight events.


Currently up and ambulating off oxygen with physical therapy. He does desat to 


86% on room air. 


Otherwise feeling well, wants to go home.





Exam/Review of Systems


Exam


Vitals





Vital Signs


  Date      Temp  Pulse  Resp  B/P (MAP)   Pulse Ox  O2          O2 Flow    FiO2


Time                                                 Delivery    Rate


   2/21/19                                                             3.0


     13:36


   2/21/19           58


     12:28


   2/21/19                 20                    86  Room Air


     11:25


   2/21/19  98.2                   115/58


     11:04                           (77)


   2/20/19                                                                    30


     01:40








Intake and Output





2/20/19 2/20/19 2/21/19





1414:59


22:59


06:59





IntakeIntake Total


800 ml


1000 ml





OutputOutput Total


400 ml





BalanceBalance


800 ml


600 ml











Exam


GENERAL: Ambulating in hallway. 


HEENT:  Pupils equal, round and reactive to light.  Extraocular muscles are 


intact.


NECK:  Supple, no thyromegaly.


LUNGS:  Distant breath sounds bilaterally, no crackles appreciated. 


CARDIOVASCULAR:  Irregularly irregular heart rate.  No rubs or gallops.


ABDOMEN:  Soft, nontender and nondistended.  Normal bowel sounds.  No rebound or


guarding.


MUSCULOSKELETAL:  1+ pitting edema in bilateral lower extremities to the mid 


calves.





Results


Results 24hrs





Laboratory Tests


Test
                 2/20/19
15:16  2/20/19
16:54  2/20/19
20:48  2/21/19
08:45


Digoxin Level                 1.1


Bedside Glucose                             287  H          174            163


Test
                 2/21/19
08:49  2/21/19
12:11  
              



White Blood Count             6.4


Red Blood Count              5.03


Hemoglobin                  12.2  L


Hematocrit                  41.1  L


Mean Corpuscular            81.7  L


Volume


Mean Corpuscular            24.3  L


Hemoglobin


Mean Corpuscular           29.7  L
  
              
              



Hemoglobin
Concent


Red Cell                    18.1  H


Distribution Width


Platelet Count                167


Mean Platelet Volume


Immature                    0.300


Granulocytes %


Neutrophils %                62.4


Lymphocytes %                21.8


Monocytes %                  10.9


Eosinophils %                 3.7


Basophils %                   0.9


Nucleated Red Blood           0.0


Cells %


Immature                    0.020


Granulocytes #


Neutrophils #                 4.0


Lymphocytes #                 1.4


Monocytes #                   0.7


Eosinophils #                 0.2


Basophils #                   0.1


Nucleated Red Blood           0.0


Cells #


Sodium Level                  140


Potassium Level               3.8


Chloride Level                90  L


Carbon Dioxide Level         41  *H


Anion Gap                       9


Blood Urea Nitrogen           72  H


Creatinine                  2.60  H


Est Glomerular               25  L
  
              
              



Filtrat Rate
mL/min


Glucose Level                 173


Calcium Level                 9.6


Bedside Glucose                              202








Medications


Medication





Current Medications


IV Flush (NS 3 ml) 3 ml PER PROTOCOL IV ;  Start 2/11/19 at 14:00


Ondansetron HCl (Zofran Inj) 4 mg Q6H  PRN IV NAUSEA/VOMITING Last administered 


on 2/13/19at 04:47; Admin Dose 4 MG;  Start 2/11/19 at 14:00


Acetaminophen (Tylenol Tab) 650 mg Q6H  PRN PO .PAIN 1-3 OR TEMP Last 


administered on 2/21/19at 06:56; Admin Dose 650 MG;  Start 2/11/19 at 14:00


Acetaminophen/ Hydrocodone Bitart (Norco (5/325)) 1 tab Q6H  PRN PO .MOD PAIN 4-


6 Last administered on 2/12/19at 13:01; Admin Dose 1 TAB;  Start 2/11/19 at 


14:00


Morphine Sulfate (morphine) 6 mg Q4H  PRN PO .SEVERE PAIN 7-10;  Start 2/11/19 


at 14:00


Docusate Sodium (Colace) 100 mg Q12H  PRN PO .CONSTIPATION Last administered on 


2/18/19at 20:37; Admin Dose 100 MG;  Start 2/11/19 at 14:00


Magnesium Hydroxide (Milk Of Mag) 30 ml DAILY  PRN PO .CONSTIPATION Last 


administered on 2/18/19at 20:06; Admin Dose 30 ML;  Start 2/11/19 at 14:00


Lorazepam (Ativan) 0.5 mg Q6H  PRN PO ANXIETY;  Start 2/11/19 at 14:00


Albuterol/ Ipratropium (Duoneb) 3 ml Q4H RESP THERAPY  PRN HHN SHORTNESS OF 


BREATH Last administered on 2/14/19at 23:35; Admin Dose 3 ML;  Start 2/11/19 at 


14:00


Hydralazine HCl (Apresoline) 10 mg Q6H  PRN IV ELEVATED BLOOD PRESSURE;  Start 


2/11/19 at 14:00


Nitroglycerin (Nitroglycerin (Sl Tab) 0.4 Mg) 1 tab Q5M  PRN SL ANGINA;  Start 


2/11/19 at 14:00


Apixaban (Eliquis) 5 mg BID PO  Last administered on 2/21/19at 10:09; Admin Dose


5 MG;  Start 2/11/19 at 21:00


Digoxin (Digoxin) 0.125 mg DAILY@1300 PO  Last administered on 2/20/19at 13:00; 


Admin Dose 0.125 MG;  Start 2/12/19 at 13:00;  Status Hold


Ergocalciferol (Drisdol) 50,000 unit Q7D PO  Last administered on 2/18/19at 


13:00; Admin Dose 50,000 UNIT;  Start 2/11/19 at 14:00


Ferrous Sulfate (Ferrous Sulfate (Ec)) 325 mg BID PO  Last administered on 


2/21/19at 10:09; Admin Dose 325 MG;  Start 2/11/19 at 21:00


Insulin Glargine (Lantus) 12 units QHS SC  Last administered on 2/20/19at 21:03;


Admin Dose 12 UNITS;  Start 2/11/19 at 21:00


Famotidine (Pepcid) 20 mg DAILY PO  Last administered on 2/21/19at 10:09; Admin 


Dose 20 MG;  Start 2/12/19 at 09:00


Atorvastatin Calcium (Lipitor) 80 mg HS PO  Last administered on 2/20/19at 


20:52; Admin Dose 80 MG;  Start 2/11/19 at 21:00


Diagnostic Test (Pha) (Accu-Chek) 1 ea 02 XX  Last administered on 2/20/19at 


01:50; Admin Dose 1 EA;  Start 2/12/19 at 02:00


Miscellaneous Information 1 ea NOTE XX ;  Start 2/11/19 at 14:30


Glucose (Glutose) 15 gm Q15M  PRN PO DECREASED GLUCOSE;  Start 2/11/19 at 14:30


Glucose (Glutose) 22.5 gm Q15M  PRN PO DECREASED GLUCOSE;  Start 2/11/19 at 


14:30


Dextrose (D50w Syringe) 25 ml Q15M  PRN IV DECREASED GLUCOSE;  Start 2/11/19 at 


14:30


Dextrose (D50w Syringe) 50 ml Q15M  PRN IV DECREASED GLUCOSE;  Start 2/11/19 at 


14:30


Glucagon (Glucagen) 1 mg Q15M  PRN IM DECREASED GLUCOSE;  Start 2/11/19 at 14:30


Glucose (Glutose) 15 gm Q15M  PRN BUCCAL DECREASED GLUCOSE Last administered on 


2/14/19at 08:36; Admin Dose 15 GM;  Start 2/11/19 at 14:30


Nicotine (Nicoderm 21 Mg/ 24hr) 1 patch DAILY TRANSDERM ;  Start 2/11/19 at 


16:30


Metoprolol Succinate (Toprol Xl) 25 mg DAILY PO  Last administered on 2/21/19at 


10:09; Admin Dose 25 MG;  Start 2/12/19 at 09:00


Patient Own Medication 1 ea BID PO  Last administered on 2/12/19at 08:09; Admin 


Dose 1 EA;  Start 2/11/19 at 21:00;  Status Hold


Insulin Aspart (Novolog Insulin Pen) (Adult SC Insulin - Mild Algorithm)... AC 


MEALS AND  BEDTIME SC  Last administered on 2/21/19at 13:40; Admin Dose 2 UNIT; 


Start 2/12/19 at 17:25


Miscellaneous Information Patients own medicat... BID@10,16 XX  Last 


administered on 2/15/19at 16:28; Admin Dose 1,600 EA;  Start 2/13/19 at 10:00


Doxazosin Mesylate (Cardura) 4 mg HS PO  Last administered on 2/15/19at 21:14; 


Admin Dose 4 MG;  Start 2/14/19 at 21:00


Trazodone HCl (Desyrel) 50 mg HS PO  Last administered on 2/20/19at 20:51; Admin


Dose 50 MG;  Start 2/16/19 at 00:30


Senna (Senokot) 1 tab BID  PRN PO CONSTIPATION;  Start 2/19/19 at 13:30


Acetazolamide (Diamox) 500 mg Q12 IV  Last administered on 2/21/19at 10:10; 


Admin Dose 500 MG;  Start 2/20/19 at 21:00


Baclofen (Lioresal) 20 mg TID  PRN PO back spasm Last administered on 2/21/19at 


10:08; Admin Dose 20 MG;  Start 2/21/19 at 10:00











ROSANNE HERNÁNDEZ MD              Feb 21, 2019 14:59

## 2019-02-21 NOTE — CONS
Assessment/Plan


Assessment/Plan


Hospital Course (Demo Recall)


IMP:


1.CHF- systolic acute on chronic


2.Cardiomyopathy- EF 30%


3.CKD


4.HTN


5.Dyslipidemia


6. Hepatomegaly-likley due to passive congestion of liver





Recc:


-Tele


-Now on diamox for contraction alkalosis


-continue bb and cardura and if BP allows will start afterload reduction with 


hydralazine low dose


-Continue statin/Eliquis


-Will resume digoxin in 1-2 days possibly as outpatient





Consultation Date/Type/Reason


Admit Date/Time


Feb 11, 2019 at 13:13


Initial Consult Date


2/11/19


Type of Consult


Cardiology


Reason for Consultation


CHF


Requesting Provider:  GORDO MCDERMOTT


Date/Time of Note


DATE: 2/21/19 


TIME: 12:23





Exam/Review of Systems


Vital Signs


Vitals





Vital Signs


  Date      Temp  Pulse  Resp  B/P (MAP)   Pulse Ox  O2          O2 Flow    FiO2


Time                                                 Delivery    Rate


   2/21/19                 20                    86  Room Air


     11:25


   2/21/19  98.2     61            115/58


     11:04                           (77)


   2/21/19                                                             2.0


     03:36


   2/20/19                                                                    30


     01:40








Intake and Output





2/20/19 2/20/19 2/21/19





1515:00


23:00


07:00





IntakeIntake Total


800 ml


1000 ml





OutputOutput Total


400 ml





BalanceBalance


800 ml


600 ml














Exam


Exam


Review of Systems:


CONSTITUTIONAL:  No fevers, chills.


PULMONARY:  No sob


CARDIOVASCULAR: No chest pain/palpitations


GASTROINTESTINAL:  No nausea/vomiting.


GENITOURINARY:  No hematuria/dysuria.


MUSCULOSKELETAL:  No myagias/arthalgias.


PSYCHIATRIC:  The patient denies depression.


NEUROLOGIC:   No weakness


Constitutional:  alert


Psych:  no complaints


Head:  normocephalic


ENMT:  mucosa pink and moist


Neck:  supple, jvd (9 cm water)


Respiratory:  clear to auscultation


Cardiovascular:  regular rate and rhythm


Gastrointestinal:  soft, non-tender


Musculoskeletal:  muscle tone (normal)


Extremities:  edema (none)


Neurological:  other (No focal deficits)





Labs


Result Diagram:  


2/21/19 0849                                                                    


           2/21/19 0849





Results 24hrs





Laboratory Tests


Test
                 2/20/19
15:16  2/20/19
16:54  2/20/19
20:48  2/21/19
08:45


Digoxin Level                 1.1


Bedside Glucose                             287  H          174            163


Test
                 2/21/19
08:49  2/21/19
12:11  
              



White Blood Count             6.4


Red Blood Count              5.03


Hemoglobin                  12.2  L


Hematocrit                  41.1  L


Mean Corpuscular            81.7  L


Volume


Mean Corpuscular            24.3  L


Hemoglobin


Mean Corpuscular           29.7  L
  
              
              



Hemoglobin
Concent


Red Cell                    18.1  H


Distribution Width


Platelet Count                167


Mean Platelet Volume


Immature                    0.300


Granulocytes %


Neutrophils %                62.4


Lymphocytes %                21.8


Monocytes %                  10.9


Eosinophils %                 3.7


Basophils %                   0.9


Nucleated Red Blood           0.0


Cells %


Immature                    0.020


Granulocytes #


Neutrophils #                 4.0


Lymphocytes #                 1.4


Monocytes #                   0.7


Eosinophils #                 0.2


Basophils #                   0.1


Nucleated Red Blood           0.0


Cells #


Sodium Level                  140


Potassium Level               3.8


Chloride Level                90  L


Carbon Dioxide Level         41  *H


Anion Gap                       9


Blood Urea Nitrogen           72  H


Creatinine                  2.60  H


Est Glomerular               25  L
  
              
              



Filtrat Rate
mL/min


Glucose Level                 173


Calcium Level                 9.6


Bedside Glucose                              202








Medications


Medications





Current Medications


IV Flush (NS 3 ml) 3 ml PER PROTOCOL IV ;  Start 2/11/19 at 14:00


Ondansetron HCl (Zofran Inj) 4 mg Q6H  PRN IV NAUSEA/VOMITING Last administered 


on 2/13/19at 04:47; Admin Dose 4 MG;  Start 2/11/19 at 14:00


Acetaminophen (Tylenol Tab) 650 mg Q6H  PRN PO .PAIN 1-3 OR TEMP Last 


administered on 2/21/19at 06:56; Admin Dose 650 MG;  Start 2/11/19 at 14:00


Acetaminophen/ Hydrocodone Bitart (Norco (5/325)) 1 tab Q6H  PRN PO .MOD PAIN 4-


6 Last administered on 2/12/19at 13:01; Admin Dose 1 TAB;  Start 2/11/19 at 


14:00


Morphine Sulfate (morphine) 6 mg Q4H  PRN PO .SEVERE PAIN 7-10;  Start 2/11/19 


at 14:00


Docusate Sodium (Colace) 100 mg Q12H  PRN PO .CONSTIPATION Last administered on 


2/18/19at 20:37; Admin Dose 100 MG;  Start 2/11/19 at 14:00


Magnesium Hydroxide (Milk Of Mag) 30 ml DAILY  PRN PO .CONSTIPATION Last 


administered on 2/18/19at 20:06; Admin Dose 30 ML;  Start 2/11/19 at 14:00


Lorazepam (Ativan) 0.5 mg Q6H  PRN PO ANXIETY;  Start 2/11/19 at 14:00


Albuterol/ Ipratropium (Duoneb) 3 ml Q4H RESP THERAPY  PRN HHN SHORTNESS OF 


BREATH Last administered on 2/14/19at 23:35; Admin Dose 3 ML;  Start 2/11/19 at 


14:00


Hydralazine HCl (Apresoline) 10 mg Q6H  PRN IV ELEVATED BLOOD PRESSURE;  Start 


2/11/19 at 14:00


Nitroglycerin (Nitroglycerin (Sl Tab) 0.4 Mg) 1 tab Q5M  PRN SL ANGINA;  Start 


2/11/19 at 14:00


Apixaban (Eliquis) 5 mg BID PO  Last administered on 2/21/19at 10:09; Admin Dose


5 MG;  Start 2/11/19 at 21:00


Digoxin (Digoxin) 0.125 mg DAILY@1300 PO  Last administered on 2/20/19at 13:00; 


Admin Dose 0.125 MG;  Start 2/12/19 at 13:00;  Status Hold


Ergocalciferol (Drisdol) 50,000 unit Q7D PO  Last administered on 2/18/19at 


13:00; Admin Dose 50,000 UNIT;  Start 2/11/19 at 14:00


Ferrous Sulfate (Ferrous Sulfate (Ec)) 325 mg BID PO  Last administered on 


2/21/19at 10:09; Admin Dose 325 MG;  Start 2/11/19 at 21:00


Insulin Glargine (Lantus) 12 units QHS SC  Last administered on 2/20/19at 21:03;


Admin Dose 12 UNITS;  Start 2/11/19 at 21:00


Famotidine (Pepcid) 20 mg DAILY PO  Last administered on 2/21/19at 10:09; Admin 


Dose 20 MG;  Start 2/12/19 at 09:00


Atorvastatin Calcium (Lipitor) 80 mg HS PO  Last administered on 2/20/19at 


20:52; Admin Dose 80 MG;  Start 2/11/19 at 21:00


Diagnostic Test (Pha) (Accu-Chek) 1 ea 02 XX  Last administered on 2/20/19at 


01:50; Admin Dose 1 EA;  Start 2/12/19 at 02:00


Miscellaneous Information 1 ea NOTE XX ;  Start 2/11/19 at 14:30


Glucose (Glutose) 15 gm Q15M  PRN PO DECREASED GLUCOSE;  Start 2/11/19 at 14:30


Glucose (Glutose) 22.5 gm Q15M  PRN PO DECREASED GLUCOSE;  Start 2/11/19 at 14:3


0


Dextrose (D50w Syringe) 25 ml Q15M  PRN IV DECREASED GLUCOSE;  Start 2/11/19 at 


14:30


Dextrose (D50w Syringe) 50 ml Q15M  PRN IV DECREASED GLUCOSE;  Start 2/11/19 at 


14:30


Glucagon (Glucagen) 1 mg Q15M  PRN IM DECREASED GLUCOSE;  Start 2/11/19 at 14:30


Glucose (Glutose) 15 gm Q15M  PRN BUCCAL DECREASED GLUCOSE Last administered on 


2/14/19at 08:36; Admin Dose 15 GM;  Start 2/11/19 at 14:30


Nicotine (Nicoderm 21 Mg/ 24hr) 1 patch DAILY TRANSDERM ;  Start 2/11/19 at 


16:30


Metoprolol Succinate (Toprol Xl) 25 mg DAILY PO  Last administered on 2/21/19at 


10:09; Admin Dose 25 MG;  Start 2/12/19 at 09:00


Patient Own Medication 1 ea BID PO  Last administered on 2/12/19at 08:09; Admin 


Dose 1 EA;  Start 2/11/19 at 21:00;  Status Hold


Insulin Aspart (Novolog Insulin Pen) (Adult SC Insulin - Mild Algorithm)... AC 


MEALS AND  BEDTIME SC  Last administered on 2/21/19at 08:54; Admin Dose 1 UNIT; 


Start 2/12/19 at 17:25


Miscellaneous Information Patients own medicat... BID@10,16 XX  Last 


administered on 2/15/19at 16:28; Admin Dose 1,600 EA;  Start 2/13/19 at 10:00


Doxazosin Mesylate (Cardura) 4 mg HS PO  Last administered on 2/15/19at 21:14; 


Admin Dose 4 MG;  Start 2/14/19 at 21:00


Trazodone HCl (Desyrel) 50 mg HS PO  Last administered on 2/20/19at 20:51; Admin


Dose 50 MG;  Start 2/16/19 at 00:30


Senna (Senokot) 1 tab BID  PRN PO CONSTIPATION;  Start 2/19/19 at 13:30


Acetazolamide (Diamox) 500 mg Q12 IV  Last administered on 2/21/19at 10:10; 


Admin Dose 500 MG;  Start 2/20/19 at 21:00


Baclofen (Lioresal) 20 mg TID  PRN PO back spasm Last administered on 2/21/19at 


10:08; Admin Dose 20 MG;  Start 2/21/19 at 10:00











ROSANNE WERNER               Feb 21, 2019 12:27

## 2019-02-22 VITALS — SYSTOLIC BLOOD PRESSURE: 102 MMHG | RESPIRATION RATE: 20 BRPM | DIASTOLIC BLOOD PRESSURE: 54 MMHG | HEART RATE: 59 BPM

## 2019-02-22 VITALS — RESPIRATION RATE: 20 BRPM | SYSTOLIC BLOOD PRESSURE: 104 MMHG | DIASTOLIC BLOOD PRESSURE: 55 MMHG | HEART RATE: 60 BPM

## 2019-02-22 VITALS — HEART RATE: 54 BPM | SYSTOLIC BLOOD PRESSURE: 102 MMHG | RESPIRATION RATE: 20 BRPM | DIASTOLIC BLOOD PRESSURE: 55 MMHG

## 2019-02-22 VITALS — SYSTOLIC BLOOD PRESSURE: 105 MMHG | DIASTOLIC BLOOD PRESSURE: 55 MMHG | HEART RATE: 50 BPM | RESPIRATION RATE: 20 BRPM

## 2019-02-22 VITALS — SYSTOLIC BLOOD PRESSURE: 113 MMHG | RESPIRATION RATE: 20 BRPM | DIASTOLIC BLOOD PRESSURE: 56 MMHG | HEART RATE: 68 BPM

## 2019-02-22 VITALS — RESPIRATION RATE: 19 BRPM | HEART RATE: 60 BPM | SYSTOLIC BLOOD PRESSURE: 105 MMHG | DIASTOLIC BLOOD PRESSURE: 57 MMHG

## 2019-02-22 VITALS — HEART RATE: 44 BPM

## 2019-02-22 VITALS — HEART RATE: 49 BPM

## 2019-02-22 VITALS — HEART RATE: 56 BPM

## 2019-02-22 LAB
ABNORMAL IP MESSAGE: 1
ADD MAN DIFF?: NO
ANION GAP: 12 (ref 5–13)
BASOPHIL #: 0.1 10^3/UL (ref 0–0.1)
BASOPHILS %: 0.8 % (ref 0–2)
BLOOD UREA NITROGEN: 69 MG/DL (ref 7–20)
CALCIUM: 9.4 MG/DL (ref 8.4–10.2)
CARBON DIOXIDE: 33 MMOL/L (ref 21–31)
CHLORIDE: 93 MMOL/L (ref 97–110)
CREATININE: 2.61 MG/DL (ref 0.61–1.24)
EOSINOPHILS #: 0.1 10^3/UL (ref 0–0.5)
EOSINOPHILS %: 1.2 % (ref 0–7)
GLUCOSE: 233 MG/DL (ref 70–220)
HEMATOCRIT: 38 % (ref 42–52)
HEMOGLOBIN: 11.3 G/DL (ref 14–18)
IMMATURE GRANS #M: 0.02 10^3/UL (ref 0–0.03)
IMMATURE GRANS % (M): 0.3 % (ref 0–0.43)
LYMPHOCYTES #: 0.9 10^3/UL (ref 0.8–2.9)
LYMPHOCYTES %: 14.8 % (ref 15–51)
MAGNESIUM: 2.7 MG/DL (ref 1.7–2.5)
MEAN CORPUSCULAR HEMOGLOBIN: 24.3 PG (ref 29–33)
MEAN CORPUSCULAR HGB CONC: 29.7 G/DL (ref 32–37)
MEAN CORPUSCULAR VOLUME: 81.7 FL (ref 82–101)
MONOCYTE #: 0.5 10^3/UL (ref 0.3–0.9)
MONOCYTES %: 8.5 % (ref 0–11)
NEUTROPHIL #: 4.5 10^3/UL (ref 1.6–7.5)
NEUTROPHILS %: 74.4 % (ref 39–77)
NUCLEATED RED BLOOD CELLS #: 0 10^3/UL (ref 0–0)
NUCLEATED RED BLOOD CELLS%: 0 /100WBC (ref 0–0)
PHOSPHORUS: 4.8 MG/DL (ref 2.5–4.9)
PLATELET COUNT: 158 10^3/UL (ref 140–415)
POSITIVE DIFF: (no result)
POTASSIUM: 4 MMOL/L (ref 3.5–5.1)
RED BLOOD COUNT: 4.65 10^6/UL (ref 4.7–6.1)
RED CELL DISTRIBUTION WIDTH: 17.7 % (ref 11.5–14.5)
SODIUM: 138 MMOL/L (ref 135–144)
WHITE BLOOD COUNT: 6 10^3/UL (ref 4.8–10.8)

## 2019-02-22 RX ADMIN — FERROUS SULFATE TAB 325 MG (65 MG ELEMENTAL FE) 1 MG: 325 (65 FE) TAB at 21:17

## 2019-02-22 RX ADMIN — APIXABAN SCH MG: 5 TABLET, FILM COATED ORAL at 08:20

## 2019-02-22 RX ADMIN — FERROUS SULFATE TAB 325 MG (65 MG ELEMENTAL FE) SCH MG: 325 (65 FE) TAB at 08:20

## 2019-02-22 RX ADMIN — ATORVASTATIN CALCIUM 1 MG: 80 TABLET, FILM COATED ORAL at 21:18

## 2019-02-22 RX ADMIN — FAMOTIDINE SCH MG: 20 TABLET ORAL at 08:21

## 2019-02-22 RX ADMIN — APIXABAN 1 MG: 5 TABLET, FILM COATED ORAL at 21:17

## 2019-02-22 RX ADMIN — METOPROLOL SUCCINATE 1 MG: 25 TABLET, EXTENDED RELEASE ORAL at 08:21

## 2019-02-22 RX ADMIN — FERROUS SULFATE TAB 325 MG (65 MG ELEMENTAL FE) SCH MG: 325 (65 FE) TAB at 21:17

## 2019-02-22 RX ADMIN — METOCLOPRAMIDE HYDROCHLORIDE SCH MG: 5 TABLET ORAL at 17:12

## 2019-02-22 RX ADMIN — ACETAZOLAMIDE SODIUM SCH MG: 500 INJECTION, POWDER, LYOPHILIZED, FOR SOLUTION INTRAVENOUS at 08:21

## 2019-02-22 RX ADMIN — INSULIN ASPART 1 UNIT: 100 INJECTION, SOLUTION INTRAVENOUS; SUBCUTANEOUS at 12:01

## 2019-02-22 RX ADMIN — INSULIN ASPART 1 UNIT: 100 INJECTION, SOLUTION INTRAVENOUS; SUBCUTANEOUS at 08:41

## 2019-02-22 RX ADMIN — DEXAMETHASONE SODIUM PHOSPHATE PRN MG: 10 INJECTION, SOLUTION INTRAMUSCULAR; INTRAVENOUS at 10:45

## 2019-02-22 RX ADMIN — ONDANSETRON HYDROCHLORIDE 1 MG: 2 INJECTION, SOLUTION INTRAMUSCULAR; INTRAVENOUS at 10:45

## 2019-02-22 RX ADMIN — APIXABAN SCH MG: 5 TABLET, FILM COATED ORAL at 21:17

## 2019-02-22 RX ADMIN — FAMOTIDINE 1 MG: 20 TABLET ORAL at 08:21

## 2019-02-22 RX ADMIN — ATORVASTATIN CALCIUM SCH MG: 80 TABLET, FILM COATED ORAL at 21:18

## 2019-02-22 RX ADMIN — DOXAZOSIN 1 MG: 4 TABLET ORAL at 21:17

## 2019-02-22 RX ADMIN — METOPROLOL SUCCINATE SCH MG: 25 TABLET, EXTENDED RELEASE ORAL at 08:21

## 2019-02-22 RX ADMIN — APIXABAN 1 MG: 5 TABLET, FILM COATED ORAL at 08:20

## 2019-02-22 RX ADMIN — INSULIN ASPART 1 UNIT: 100 INJECTION, SOLUTION INTRAVENOUS; SUBCUTANEOUS at 21:55

## 2019-02-22 RX ADMIN — METOCLOPRAMIDE HYDROCHLORIDE 1 MG: 5 TABLET ORAL at 17:12

## 2019-02-22 RX ADMIN — ACETAZOLAMIDE SODIUM 1 MG: 500 INJECTION, POWDER, LYOPHILIZED, FOR SOLUTION INTRAVENOUS at 08:21

## 2019-02-22 RX ADMIN — FERROUS SULFATE TAB 325 MG (65 MG ELEMENTAL FE) 1 MG: 325 (65 FE) TAB at 08:20

## 2019-02-22 RX ADMIN — INSULIN ASPART 1 UNIT: 100 INJECTION, SOLUTION INTRAVENOUS; SUBCUTANEOUS at 17:09

## 2019-02-22 RX ADMIN — DOXAZOSIN SCH MG: 4 TABLET ORAL at 21:17

## 2019-02-22 RX ADMIN — INSULIN GLARGINE 1 UNITS: 100 INJECTION, SOLUTION SUBCUTANEOUS at 21:33

## 2019-02-22 NOTE — PN
Date/Time of Note


Date/Time of Note


DATE: 2/22/19 


TIME: 17:41





Assessment/Plan


VTE Prophylaxis


Risk score (from Ns)>0 risk:  4


SCD applied (from Ns):  No


SCD contraindicated:  low risk/ambulating


Pharmacological prophylaxis:  rivaroxaban





Lines/Catheters


IV Catheter Type (from [facility]):  Mid Line


Urinary Cath still in place:  No





Assessment/Plan


Assessment/Plan


68-year-old man coming in with chest pressure, shortness of breath, lower 


extremity swelling, signs of congestive heart failure exacerbation.





1.  Shortness of breath and chest pressure 


   -overall slowly improving now.


   -Continue fluid restriction as recommended by cardiology and renal teams. 


   -Currently holding diuresis due to severe contraction alkalosis. 


   - follow up Cardiology recommendations and continue digoxin for now.


   -Off of Entresto presently while in-house


2. Acute on chronic kidney disease.  Again, the kidney function appears to be 


slightly worse than when he was here 2 months ago.  Creatinine levels have been 


in the low to mid 2 range.  Patient having adequate urine output.


   -Follow-up recommendations from renal consult


   -Continue to carefully monitor BUN and creatinine levels and urine output. 


3.  Smoking history.  


   -Counseled on cessation. 


   -Continue nicotine patch.


4.  History of hypertension.  Again, see above.  


   - Continue current medications.  


   - Also hydralazine p.r.n.


5.  History of atrial fibrillation -appears stable


   -Monitor, continue Eliquis as well.


6.  Type 2 diabetes.  A1c equals 9.0, sugars presently stable 


   - continue Lantus and sliding scale insulin.


7.  Deep venous thrombosis prophylaxis-on Eliquis.





Dispo: Currently hospitalized for severe metabolic alkalosis, anticipate 


discharge in 24-48 hours


Result Diagram:  


2/22/19 0604                                                                    


           2/22/19 0604








Subjective


24 Hr Interval Summary


Free Text/Dictation


No acute overnight events.


Patient is ambulating around hallway with assistance. Desats to 70s when ambula


ting on room air.


He did have one episode of vomiting overnight and this morning.


Also reports occasional episodes of dizziness and seeing stars when lying in 


bed.





Exam/Review of Systems


Exam


Vitals





Vital Signs


  Date      Temp  Pulse  Resp  B/P (MAP)   Pulse Ox  O2          O2 Flow    FiO2


Time                                                 Delivery    Rate


   2/22/19                                                             3.0


     17:05


   2/22/19           56


     16:12


   2/22/19  97.4           20      102/55       100  Nasal


     15:27                           (71)            Cannula


   2/20/19                                                                    30


     01:40








Intake and Output





2/21/19 2/21/19 2/22/19





1515:00


23:00


07:00





IntakeIntake Total


960 ml





BalanceBalance


960 ml











Exam


GENERAL: Ambulating in hallway. 


HEENT:  Pupils equal, round and reactive to light.  Extraocular muscles are 


intact.


NECK:  Supple, no thyromegaly.


LUNGS:  Distant breath sounds bilaterally, no crackles appreciated. 


CARDIOVASCULAR:  Irregularly irregular heart rate.  No rubs or gallops.


ABDOMEN:  Soft, nontender and nondistended.  Normal bowel sounds.  No rebound or


guarding.


MUSCULOSKELETAL:  1+ pitting edema in bilateral lower extremities to the mid 


calves.





Results


Results 24hrs





Laboratory Tests


Test
                 2/21/19
19:22  2/21/19
21:54  2/22/19
03:18  2/22/19
06:04


Bedside Glucose              226  H         223  H         224  H


White Blood Count                                                          6.0


Red Blood Count                                                          4.65  L


Hemoglobin                                                               11.3  L


Hematocrit                                                               38.0  L


Mean Corpuscular                                                         81.7  L


Volume


Mean Corpuscular                                                         24.3  L


Hemoglobin


Mean Corpuscular      
              
              
                   29.7  L



Hemoglobin
Concent


Red Cell                                                                 17.7  H


Distribution Width


Platelet Count                                                             158


Mean Platelet Volume


Immature                                                                 0.300


Granulocytes %


Neutrophils %                                                             74.4


Lymphocytes %                                                            14.8  L


Monocytes %                                                                8.5


Eosinophils %                                                              1.2


Basophils %                                                                0.8


Nucleated Red Blood                                                        0.0


Cells %


Immature                                                                 0.020


Granulocytes #


Neutrophils #                                                              4.5


Lymphocytes #                                                              0.9


Monocytes #                                                                0.5


Eosinophils #                                                              0.1


Basophils #                                                                0.1


Nucleated Red Blood                                                        0.0


Cells #


Sodium Level                                                               138


Potassium Level                                                            4.0


Chloride Level                                                             93  L


Carbon Dioxide Level                                                       33  H


Anion Gap                                                                   12


Blood Urea Nitrogen                                                        69  H


Creatinine                                                               2.61  H


Est Glomerular        
              
              
                     25  L



Filtrat Rate
mL/min


Glucose Level                                                             233  H


Calcium Level                                                              9.4


Phosphorus Level                                                           4.8


Magnesium Level                                                           2.7  H


Test
                 2/22/19
08:19  2/22/19
11:44  2/22/19
17:03  



Bedside Glucose               199            210           258  H








Medications


Medication





Current Medications


IV Flush (NS 3 ml) 3 ml PER PROTOCOL IV ;  Start 2/11/19 at 14:00


Ondansetron HCl (Zofran Inj) 4 mg Q6H  PRN IV NAUSEA/VOMITING Last administered 


on 2/22/19at 10:45; Admin Dose 4 MG;  Start 2/11/19 at 14:00


Acetaminophen (Tylenol Tab) 650 mg Q6H  PRN PO .PAIN 1-3 OR TEMP Last 


administered on 2/21/19at 06:56; Admin Dose 650 MG;  Start 2/11/19 at 14:00


Acetaminophen/ Hydrocodone Bitart (Norco (5/325)) 1 tab Q6H  PRN PO .MOD PAIN 4-


6 Last administered on 2/12/19at 13:01; Admin Dose 1 TAB;  Start 2/11/19 at 1


4:00


Morphine Sulfate (morphine) 6 mg Q4H  PRN PO .SEVERE PAIN 7-10;  Start 2/11/19 


at 14:00


Docusate Sodium (Colace) 100 mg Q12H  PRN PO .CONSTIPATION Last administered on 


2/18/19at 20:37; Admin Dose 100 MG;  Start 2/11/19 at 14:00


Lorazepam (Ativan) 0.5 mg Q6H  PRN PO ANXIETY;  Start 2/11/19 at 14:00


Albuterol/ Ipratropium (Duoneb) 3 ml Q4H RESP THERAPY  PRN HHN SHORTNESS OF 


BREATH Last administered on 2/14/19at 23:35; Admin Dose 3 ML;  Start 2/11/19 at 


14:00


Hydralazine HCl (Apresoline) 10 mg Q6H  PRN IV ELEVATED BLOOD PRESSURE;  Start 


2/11/19 at 14:00


Nitroglycerin (Nitroglycerin (Sl Tab) 0.4 Mg) 1 tab Q5M  PRN SL ANGINA;  Start 


2/11/19 at 14:00


Apixaban (Eliquis) 5 mg BID PO  Last administered on 2/22/19at 08:20; Admin Dose


5 MG;  Start 2/11/19 at 21:00


Digoxin (Digoxin) 0.125 mg DAILY@1300 PO  Last administered on 2/20/19at 13:00; 


Admin Dose 0.125 MG;  Start 2/12/19 at 13:00;  Status Hold


Ergocalciferol (Drisdol) 50,000 unit Q7D PO  Last administered on 2/18/19at 


13:00; Admin Dose 50,000 UNIT;  Start 2/11/19 at 14:00


Ferrous Sulfate (Ferrous Sulfate (Ec)) 325 mg BID PO  Last administered on 


2/22/19at 08:20; Admin Dose 325 MG;  Start 2/11/19 at 21:00


Famotidine (Pepcid) 20 mg DAILY PO  Last administered on 2/22/19at 08:21; Admin 


Dose 20 MG;  Start 2/12/19 at 09:00


Atorvastatin Calcium (Lipitor) 80 mg HS PO  Last administered on 2/21/19at 


21:50; Admin Dose 80 MG;  Start 2/11/19 at 21:00


Diagnostic Test (Pha) (Accu-Chek) 1 ea 02 XX  Last administered on 2/22/19at 


03:00; Admin Dose 1 EA;  Start 2/12/19 at 02:00


Miscellaneous Information 1 ea NOTE XX ;  Start 2/11/19 at 14:30


Glucose (Glutose) 15 gm Q15M  PRN PO DECREASED GLUCOSE;  Start 2/11/19 at 14:30


Glucose (Glutose) 22.5 gm Q15M  PRN PO DECREASED GLUCOSE;  Start 2/11/19 at 


14:30


Dextrose (D50w Syringe) 25 ml Q15M  PRN IV DECREASED GLUCOSE;  Start 2/11/19 at 


14:30


Dextrose (D50w Syringe) 50 ml Q15M  PRN IV DECREASED GLUCOSE;  Start 2/11/19 at 


14:30


Glucagon (Glucagen) 1 mg Q15M  PRN IM DECREASED GLUCOSE;  Start 2/11/19 at 14:30


Glucose (Glutose) 15 gm Q15M  PRN BUCCAL DECREASED GLUCOSE Last administered on 


2/14/19at 08:36; Admin Dose 15 GM;  Start 2/11/19 at 14:30


Nicotine (Nicoderm 21 Mg/ 24hr) 1 patch DAILY TRANSDERM ;  Start 2/11/19 at 


16:30


Metoprolol Succinate (Toprol Xl) 25 mg DAILY PO  Last administered on 2/22/19at 


08:21; Admin Dose 25 MG;  Start 2/12/19 at 09:00


Patient Own Medication 1 ea BID PO  Last administered on 2/12/19at 08:09; Admin 


Dose 1 EA;  Start 2/11/19 at 21:00;  Status Hold


Insulin Aspart (Novolog Insulin Pen) (Adult SC Insulin - Mild Algorithm)... AC 


MEALS AND  BEDTIME SC  Last administered on 2/22/19at 17:09; Admin Dose 3 UNIT; 


Start 2/12/19 at 17:25


Miscellaneous Information Patients own medicat... BID@10,16 XX  Last 


administered on 2/21/19at 16:58; Admin Dose 1 EA;  Start 2/13/19 at 10:00


Doxazosin Mesylate (Cardura) 4 mg HS PO  Last administered on 2/15/19at 21:14; 


Admin Dose 4 MG;  Start 2/14/19 at 21:00


Trazodone HCl (Desyrel) 50 mg HS PO  Last administered on 2/21/19at 21:50; Admin


Dose 50 MG;  Start 2/16/19 at 00:30


Senna (Senokot) 1 tab BID  PRN PO CONSTIPATION;  Start 2/19/19 at 13:30


Baclofen (Lioresal) 20 mg TID  PRN PO back spasm Last administered on 2/21/19at 


10:08; Admin Dose 20 MG;  Start 2/21/19 at 10:00


Insulin Glargine (Lantus) 20 units QHS SC ;  Start 2/22/19 at 21:00


Metoclopramide HCl (Reglan) 5 mg BEFORE  MEALS PO  Last administered on 


2/22/19at 17:12; Admin Dose 5 MG;  Start 2/22/19 at 17:30











ROSANNE HERNÁNDEZ MD              Feb 22, 2019 17:43

## 2019-02-22 NOTE — CONS
Assessment/Plan


Assessment/Plan


Hospital Course (Demo Recall)


IMP:


1.CHF- systolic acute on chronic


2.Cardiomyopathy- EF 30%


3.CKD


4.HTN


5.Dyslipidemia


6. Hepatomegaly-likley due to passive congestion of liver


7. nausea/vomiting





Recc:


-Tele


-Now on diamox for contraction alkalosis and holding lasix


-continue bb and cardura and if BP allows will start afterload reduction with 


hydralazine low dose


-Continue statin/Eliquis


-Will resume digoxin in 1-2 days


-ongoing eval of n/v





Consultation Date/Type/Reason


Admit Date/Time


Feb 11, 2019 at 13:13


Initial Consult Date


2/11/19


Type of Consult


Cardiology


Reason for Consultation


CHF


Requesting Provider:  GORDO MCDERMOTT


Date/Time of Note


DATE: 2/22/19 


TIME: 12:48





Exam/Review of Systems


Vital Signs


Vitals





Vital Signs


  Date      Temp  Pulse  Resp  B/P (MAP)   Pulse Ox  O2          O2 Flow    FiO2


Time                                                 Delivery    Rate


   2/22/19           56


     12:11


   2/22/19  97.8           20      102/54       100  Nasal


     12:07                           (70)            Cannula


   2/22/19                                                             3.0


     08:01


   2/20/19                                                                    30


     01:40








Intake and Output





2/21/19 2/21/19 2/22/19





1515:00


23:00


07:00





IntakeIntake Total


960 ml





BalanceBalance


960 ml














Exam


Exam


Review of Systems:


CONSTITUTIONAL:  No fevers, chills.


PULMONARY:  No sob


CARDIOVASCULAR: No chest pain/palpitations


GASTROINTESTINAL:  No nausea/vomiting.


GENITOURINARY:  No hematuria/dysuria.


MUSCULOSKELETAL:  No myagias/arthalgias.


PSYCHIATRIC:  The patient denies depression.


NEUROLOGIC:   No weakness


Constitutional:  alert


Psych:  no complaints


Head:  normocephalic


ENMT:  mucosa pink and moist


Neck:  supple, jvd (9cm water)


Respiratory:  diminished breath sounds


Cardiovascular:  regular rate and rhythm


Gastrointestinal:  soft, non-tender


Musculoskeletal:  muscle tone (normal)


Extremities:  edema (none)


Neurological:  other (No focal deficits)





Labs


Result Diagram:  


2/22/19 0604                                                                    


           2/22/19 0604





Results 24hrs





Laboratory Tests


Test
              2/21/19
16:34     2/21/19
16:51  2/21/19
17:35  2/21/19
19:22


Bedside Glucose           256  H                            210           226  H


Blood Gas          
              Blood arterial
   
              



Specimen Source



Arterial Blood     
              2/21/2019
5:15:5  
              



Date Drawn
                                   2 PM


Arterial Blood pH  
                      7.439  
  
              



(Temp
corrected)


Arterial Blood     
                      54.5  H
  
              



pCO2


(Temp
correct)


Arterial Blood     
                      64.2  L
  
              



pO2


(Temp
corrected)


Arterial Blood                             36.1  H


HCO3


Arterial Blood                             10.1  H


Base Excess


Arterial Blood     
                      93.6  L
  
              



Oxygen
Saturation


Matty Test                        N/A


Arterial Blood     
              LB  
             
              



Gas Puncture
Site


Arterial           
                        0.6  
  
              



Blood
Carboxyhemo


globin


Arterial Blood                               0.3


Methemoglobin


Blood Gas A-a O2                           85.7  H


Differential


Oxyhemoglobin                              92.8  L


Percent


Blood Gas                                   37.0


Temperature


Blood Gas                         NASAL CANNULA


Modality


FiO2                                        30.0


Blood Gas                         OLGA LIDIA JONES


Notified Whom


Blood Gas          
              2/21/2019
5:30:2  
              



Notified Time
                                4 PM


Test
              2/21/19
21:54     2/22/19
03:18  2/22/19
06:04  2/22/19
08:19


Bedside Glucose           223  H            224  H                         199


White Blood Count                                           6.0


Red Blood Count                                           4.65  L


Hemoglobin                                                11.3  L


Hematocrit                                                38.0  L


Mean Corpuscular                                          81.7  L


Volume


Mean Corpuscular                                          24.3  L


Hemoglobin


Mean Corpuscular   
              
                      29.7  L
  



Hemoglobin
Concen


t


Red Cell                                                  17.7  H


Distribution


Width


Platelet Count                                              158


Mean Platelet


Volume


Immature                                                  0.300


Granulocytes %


Neutrophils %                                              74.4


Lymphocytes %                                             14.8  L


Monocytes %                                                 8.5


Eosinophils %                                               1.2


Basophils %                                                 0.8


Nucleated Red                                               0.0


Blood Cells %


Immature                                                  0.020


Granulocytes #


Neutrophils #                                               4.5


Lymphocytes #                                               0.9


Monocytes #                                                 0.5


Eosinophils #                                               0.1


Basophils #                                                 0.1


Nucleated Red                                               0.0


Blood Cells #


Sodium Level                                                138


Potassium Level                                             4.0


Chloride Level                                              93  L


Carbon Dioxide                                              33  H


Level


Anion Gap                                                    12


Blood Urea                                                  69  H


Nitrogen


Creatinine                                                2.61  H


Est Glomerular     
              
                        25  L
  



Filtrat


Rate
mL/min


Glucose Level                                              233  H


Calcium Level                                               9.4


Phosphorus Level                                            4.8


Magnesium Level                                            2.7  H


Test
              2/22/19
11:44  
                 
              



Bedside Glucose            210








Medications


Medications





Current Medications


IV Flush (NS 3 ml) 3 ml PER PROTOCOL IV ;  Start 2/11/19 at 14:00


Ondansetron HCl (Zofran Inj) 4 mg Q6H  PRN IV NAUSEA/VOMITING Last administered 


on 2/22/19at 10:45; Admin Dose 4 MG;  Start 2/11/19 at 14:00


Acetaminophen (Tylenol Tab) 650 mg Q6H  PRN PO .PAIN 1-3 OR TEMP Last 


administered on 2/21/19at 06:56; Admin Dose 650 MG;  Start 2/11/19 at 14:00


Acetaminophen/ Hydrocodone Bitart (Norco (5/325)) 1 tab Q6H  PRN PO .MOD PAIN 4-


6 Last administered on 2/12/19at 13:01; Admin Dose 1 TAB;  Start 2/11/19 at 


14:00


Morphine Sulfate (morphine) 6 mg Q4H  PRN PO .SEVERE PAIN 7-10;  Start 2/11/19 


at 14:00


Docusate Sodium (Colace) 100 mg Q12H  PRN PO .CONSTIPATION Last administered on 


2/18/19at 20:37; Admin Dose 100 MG;  Start 2/11/19 at 14:00


Magnesium Hydroxide (Milk Of Mag) 30 ml DAILY  PRN PO .CONSTIPATION Last 


administered on 2/18/19at 20:06; Admin Dose 30 ML;  Start 2/11/19 at 14:00


Lorazepam (Ativan) 0.5 mg Q6H  PRN PO ANXIETY;  Start 2/11/19 at 14:00


Albuterol/ Ipratropium (Duoneb) 3 ml Q4H RESP THERAPY  PRN HHN SHORTNESS OF 


BREATH Last administered on 2/14/19at 23:35; Admin Dose 3 ML;  Start 2/11/19 at 


14:00


Hydralazine HCl (Apresoline) 10 mg Q6H  PRN IV ELEVATED BLOOD PRESSURE;  Start 


2/11/19 at 14:00


Nitroglycerin (Nitroglycerin (Sl Tab) 0.4 Mg) 1 tab Q5M  PRN SL ANGINA;  Start 


2/11/19 at 14:00


Apixaban (Eliquis) 5 mg BID PO  Last administered on 2/22/19at 08:20; Admin Dose


5 MG;  Start 2/11/19 at 21:00


Digoxin (Digoxin) 0.125 mg DAILY@1300 PO  Last administered on 2/20/19at 13:00; 


Admin Dose 0.125 MG;  Start 2/12/19 at 13:00;  Status Hold


Ergocalciferol (Drisdol) 50,000 unit Q7D PO  Last administered on 2/18/19at 


13:00; Admin Dose 50,000 UNIT;  Start 2/11/19 at 14:00


Ferrous Sulfate (Ferrous Sulfate (Ec)) 325 mg BID PO  Last administered on 


2/22/19at 08:20; Admin Dose 325 MG;  Start 2/11/19 at 21:00


Famotidine (Pepcid) 20 mg DAILY PO  Last administered on 2/22/19at 08:21; Admin 


Dose 20 MG;  Start 2/12/19 at 09:00


Atorvastatin Calcium (Lipitor) 80 mg HS PO  Last administered on 2/21/19at 


21:50; Admin Dose 80 MG;  Start 2/11/19 at 21:00


Diagnostic Test (Pha) (Accu-Chek) 1 ea 02 XX  Last administered on 2/22/19at 


03:00; Admin Dose 1 EA;  Start 2/12/19 at 02:00


Miscellaneous Information 1 ea NOTE XX ;  Start 2/11/19 at 14:30


Glucose (Glutose) 15 gm Q15M  PRN PO DECREASED GLUCOSE;  Start 2/11/19 at 14:30


Glucose (Glutose) 22.5 gm Q15M  PRN PO DECREASED GLUCOSE;  Start 2/11/19 at 


14:30


Dextrose (D50w Syringe) 25 ml Q15M  PRN IV DECREASED GLUCOSE;  Start 2/11/19 at 


14:30


Dextrose (D50w Syringe) 50 ml Q15M  PRN IV DECREASED GLUCOSE;  Start 2/11/19 at 


14:30


Glucagon (Glucagen) 1 mg Q15M  PRN IM DECREASED GLUCOSE;  Start 2/11/19 at 14:30


Glucose (Glutose) 15 gm Q15M  PRN BUCCAL DECREASED GLUCOSE Last administered on 


2/14/19at 08:36; Admin Dose 15 GM;  Start 2/11/19 at 14:30


Nicotine (Nicoderm 21 Mg/ 24hr) 1 patch DAILY TRANSDERM ;  Start 2/11/19 at 


16:30


Metoprolol Succinate (Toprol Xl) 25 mg DAILY PO  Last administered on 2/22/19at 


08:21; Admin Dose 25 MG;  Start 2/12/19 at 09:00


Patient Own Medication 1 ea BID PO  Last administered on 2/12/19at 08:09; Admin 


Dose 1 EA;  Start 2/11/19 at 21:00;  Status Hold


Insulin Aspart (Novolog Insulin Pen) (Adult SC Insulin - Mild Algorithm)... AC 


MEALS AND  BEDTIME SC  Last administered on 2/22/19at 12:01; Admin Dose 2 UNIT; 


Start 2/12/19 at 17:25


Miscellaneous Information Patients own medicat... BID@10,16 XX  Last 


administered on 2/21/19at 16:58; Admin Dose 1 EA;  Start 2/13/19 at 10:00


Doxazosin Mesylate (Cardura) 4 mg HS PO  Last administered on 2/15/19at 21:14; 


Admin Dose 4 MG;  Start 2/14/19 at 21:00


Trazodone HCl (Desyrel) 50 mg HS PO  Last administered on 2/21/19at 21:50; Admin


Dose 50 MG;  Start 2/16/19 at 00:30


Senna (Senokot) 1 tab BID  PRN PO CONSTIPATION;  Start 2/19/19 at 13:30


Acetazolamide (Diamox) 500 mg Q12 IV  Last administered on 2/22/19at 08:21; 


Admin Dose 500 MG;  Start 2/20/19 at 21:00


Baclofen (Lioresal) 20 mg TID  PRN PO back spasm Last administered on 2/21/19at 


10:08; Admin Dose 20 MG;  Start 2/21/19 at 10:00


Insulin Glargine (Lantus) 20 units QHS SC ;  Start 2/22/19 at 21:00











ROSANNE WERNER               Feb 22, 2019 12:53

## 2019-02-22 NOTE — CONS
[facility]


                                        


                                        


                                        


                                        


                                Consult Follow-up


                                        


Patient Name: Trevor Lei                           Unit Number: [id number]


YOB: 1950                     Patient Status: Admitted Inpatient


Attending Doctor: Wil Larkin MD                 Account Number: [id number]





Edit: JOSE YANG MD on 2/22/19 @ 18:28





SEEN AND EXAMINED


PT NAUSEA/VOMITING/GI UPSET/CONFUSION


DC NARCOTICS


DC SLEEPING PILL/BACLOFEN


BICARB IMPROVED DC DIAMOX


______________


_______________________________________________________________________


                                                    


Assessment/Plan


Assessment/Plan


Hospital Course (Demo Recall)


1.  Shortness of breath with bilateral lower extremity edema, orthopnea and PND 


likely secondary to acute on chronic congestive heart failure.


2.  Acute on chronic renal failure; however, the patient's baseline creatinine 


ranges from 1.9 to 2.  On last discharge in December, the creatinine was 1.9.  


This could be all secondary to cardiorenal as the patient is clearly in 


decompensated heart failure right now., pt had Renal U/S in 12/18 which showed 


chronic kidney disease, 


3.  History of atrial fibrillation.


4.  Hypertension.


5.  Diabetes.


6.  Hyperlipidemia.


7.  BPH.


8.  Positive troponin with chest pain ? NSTEMI. Per cardiology CHF EF 35%


9.  Elevated BNP.


 10 Microcytic hypochromic anemia


11. Overweight


12. fatty liver infiltration


13. s/p cholecystectomy


14/ Alkalosis. 


15. NAusea with episode of vomiting.


Assessment/Plan (Daily)


-Hold lasix due to contraction alkalosis


- cw  hold off Entresto at this point.


-  Digoxin levels normal.


-. Keep a systolic blood pressure of greater than 100.


-   Renally dose all meds.


-   Avoid nephrotoxic agents.





Consultation Date/Type/Reason


Admit Date/Time


Feb 11, 2019 at 13:13


Initial Consult Date


2/11/2019


Type of Consult


nephrology


Requesting Provider:  GORDO MCDERMOTT


Date/Time of Note


DATE: 2/22/19 


TIME: 12:51





24 HR Interval Summary


Free Text/Dictation


nausea, vomit 2 times





Exam/Review of Systems


Exam


Vitals





Vital Signs


  Date      Temp  Pulse  Resp  B/P (MAP)   Pulse Ox  O2          O2 Flow    FiO2


Time                                                 Delivery    Rate


   2/22/19           56


     12:11


   2/22/19  97.8           20      102/54       100  Nasal


     12:07                           (70)            Cannula


   2/22/19                                                             3.0


     08:01


   2/20/19                                                                    30


     01:40








Intake and Output





2/21/19 2/21/19 2/22/19





1515:00


23:00


07:00





IntakeIntake Total


960 ml





BalanceBalance


960 ml











Constitutional:  alert, oriented


Respiratory:  clear to auscultation


Cardiovascular:  regular rate and rhythm


Gastrointestinal:  soft, rebound or guarding; 


   No nl liver, spleen, No non-tender, No ascites, No bowel sounds, No 


distended, No firm, No hepatomegaly, No mass, No splenomegaly, No surgical 


scars, No tender, No other





Results


Result Diagram:  


2/22/19 0604                                                                    


           2/22/19 0604





Results 24hrs





Laboratory Tests


Test
              2/21/19
16:34     2/21/19
16:51  2/21/19
17:35  2/21/19
19:22


Bedside Glucose           256  H                            210           226  H


Blood Gas          
              Blood arterial
   
              



Specimen Source



Arterial Blood     
              2/21/2019
5:15:5  
              



Date Drawn
                                   2 PM


Arterial Blood pH  
                      7.439  
  
              



(Temp
corrected)


Arterial Blood     
                      54.5  H
  
              



pCO2


(Temp
correct)


Arterial Blood     
                      64.2  L
  
              



pO2


(Temp
corrected)


Arterial Blood                             36.1  H


HCO3


Arterial Blood                             10.1  H


Base Excess


Arterial Blood     
                      93.6  L
  
              



Oxygen
Saturation


Matty Test                        N/A


Arterial Blood     
              LB  
             
              



Gas Puncture
Site


Arterial           
                        0.6  
  
              



Blood
Carboxyhemo


globin


Arterial Blood                               0.3


Methemoglobin


Blood Gas A-a O2                           85.7  H


Differential


Oxyhemoglobin                              92.8  L


Percent


Blood Gas                                   37.0


Temperature


Blood Gas                         NASAL CANNULA


Modality


FiO2                                        30.0


Blood Gas                         OLGA LIDIA JONES


Notified Whom


Blood Gas          
              2/21/2019
5:30:2  
              



Notified Time
                                4 PM


Test
              2/21/19
21:54     2/22/19
03:18  2/22/19
06:04  2/22/19
08:19


Bedside Glucose           223  H            224  H                         199


White Blood Count                                           6.0


Red Blood Count                                           4.65  L


Hemoglobin                                                11.3  L


Hematocrit                                                38.0  L


Mean Corpuscular                                          81.7  L


Volume


Mean Corpuscular                                          24.3  L


Hemoglobin


Mean Corpuscular   
              
                      29.7  L
  



Hemoglobin
Concen


t


Red Cell                                                  17.7  H


Distribution


Width


Platelet Count                                              158


Mean Platelet


Volume


Immature                                                  0.300


Granulocytes %


Neutrophils %                                              74.4


Lymphocytes %                                             14.8  L


Monocytes %                                                 8.5


Eosinophils %                                               1.2


Basophils %                                                 0.8


Nucleated Red                                               0.0


Blood Cells %


Immature                                                  0.020


Granulocytes #


Neutrophils #                                               4.5


Lymphocytes #                                               0.9


Monocytes #                                                 0.5


Eosinophils #                                               0.1


Basophils #                                                 0.1


Nucleated Red                                               0.0


Blood Cells #


Sodium Level                                                138


Potassium Level                                             4.0


Chloride Level                                              93  L


Carbon Dioxide                                              33  H


Level


Anion Gap                                                    12


Blood Urea                                                  69  H


Nitrogen


Creatinine                                                2.61  H


Est Glomerular     
              
                        25  L
  



Filtrat


Rate
mL/min


Glucose Level                                              233  H


Calcium Level                                               9.4


Phosphorus Level                                            4.8


Magnesium Level                                            2.7  H


Test
              2/22/19
11:44  
                 
              



Bedside Glucose            210








Medications


Medication





Current Medications


IV Flush (NS 3 ml) 3 ml PER PROTOCOL IV ;  Start 2/11/19 at 14:00


Ondansetron HCl (Zofran Inj) 4 mg Q6H  PRN IV NAUSEA/VOMITING Last administered 


on 2/22/19at 10:45; Admin Dose 4 MG;  Start 2/11/19 at 14:00


Acetaminophen (Tylenol Tab) 650 mg Q6H  PRN PO .PAIN 1-3 OR TEMP Last 


administered on 2/21/19at 06:56; Admin Dose 650 MG;  Start 2/11/19 at 14:00


Acetaminophen/ Hydrocodone Bitart (Norco (5/325)) 1 tab Q6H  PRN PO .MOD PAIN 4-


6 Last administered on 2/12/19at 13:01; Admin Dose 1 TAB;  Start 2/11/19 at 


14:00


Morphine Sulfate (morphine) 6 mg Q4H  PRN PO .SEVERE PAIN 7-10;  Start 2/11/19 


at 14:00


Docusate Sodium (Colace) 100 mg Q12H  PRN PO .CONSTIPATION Last administered on 


2/18/19at 20:37; Admin Dose 100 MG;  Start 2/11/19 at 14:00


Magnesium Hydroxide (Milk Of Mag) 30 ml DAILY  PRN PO .CONSTIPATION Last 


administered on 2/18/19at 20:06; Admin Dose 30 ML;  Start 2/11/19 at 14:00


Lorazepam (Ativan) 0.5 mg Q6H  PRN PO ANXIETY;  Start 2/11/19 at 14:00


Albuterol/ Ipratropium (Duoneb) 3 ml Q4H RESP THERAPY  PRN HHN SHORTNESS OF 


BREATH Last administered on 2/14/19at 23:35; Admin Dose 3 ML;  Start 2/11/19 at 


14:00


Hydralazine HCl (Apresoline) 10 mg Q6H  PRN IV ELEVATED BLOOD PRESSURE;  Start 


2/11/19 at 14:00


Nitroglycerin (Nitroglycerin (Sl Tab) 0.4 Mg) 1 tab Q5M  PRN SL ANGINA;  Start 


2/11/19 at 14:00


Apixaban (Eliquis) 5 mg BID PO  Last administered on 2/22/19at 08:20; Admin Dose


5 MG;  Start 2/11/19 at 21:00


Digoxin (Digoxin) 0.125 mg DAILY@1300 PO  Last administered on 2/20/19at 13:00; 


Admin Dose 0.125 MG;  Start 2/12/19 at 13:00;  Status Hold


Ergocalciferol (Drisdol) 50,000 unit Q7D PO  Last administered on 2/18/19at 


13:00; Admin Dose 50,000 UNIT;  Start 2/11/19 at 14:00


Ferrous Sulfate (Ferrous Sulfate (Ec)) 325 mg BID PO  Last administered on 


2/22/19at 08:20; Admin Dose 325 MG;  Start 2/11/19 at 21:00


Famotidine (Pepcid) 20 mg DAILY PO  Last administered on 2/22/19at 08:21; Admin 


Dose 20 MG;  Start 2/12/19 at 09:00


Atorvastatin Calcium (Lipitor) 80 mg HS PO  Last administered on 2/21/19at 


21:50; Admin Dose 80 MG;  Start 2/11/19 at 21:00


Diagnostic Test (Pha) (Accu-Chek) 1 ea 02 XX  Last administered on 2/22/19at 


03:00; Admin Dose 1 EA;  Start 2/12/19 at 02:00


Miscellaneous Information 1 ea NOTE XX ;  Start 2/11/19 at 14:30


Glucose (Glutose) 15 gm Q15M  PRN PO DECREASED GLUCOSE;  Start 2/11/19 at 14:30


Glucose (Glutose) 22.5 gm Q15M  PRN PO DECREASED GLUCOSE;  Start 2/11/19 at 


14:30


Dextrose (D50w Syringe) 25 ml Q15M  PRN IV DECREASED GLUCOSE;  Start 2/11/19 at 


14:30


Dextrose (D50w Syringe) 50 ml Q15M  PRN IV DECREASED GLUCOSE;  Start 2/11/19 at 


14:30


Glucagon (Glucagen) 1 mg Q15M  PRN IM DECREASED GLUCOSE;  Start 2/11/19 at 14:30


Glucose (Glutose) 15 gm Q15M  PRN BUCCAL DECREASED GLUCOSE Last administered on 


2/14/19at 08:36; Admin Dose 15 GM;  Start 2/11/19 at 14:30


Nicotine (Nicoderm 21 Mg/ 24hr) 1 patch DAILY TRANSDERM ;  Start 2/11/19 at 


16:30


Metoprolol Succinate (Toprol Xl) 25 mg DAILY PO  Last administered on 2/22/19at 


08:21; Admin Dose 25 MG;  Start 2/12/19 at 09:00


Patient Own Medication 1 ea BID PO  Last administered on 2/12/19at 08:09; Admin 


Dose 1 EA;  Start 2/11/19 at 21:00;  Status Hold


Insulin Aspart (Novolog Insulin Pen) (Adult SC Insulin - Mild Algorithm)... AC 


MEALS AND  BEDTIME SC  Last administered on 2/22/19at 12:01; Admin Dose 2 UNIT; 


Start 2/12/19 at 17:25


Miscellaneous Information Patients own medicat... BID@10,16 XX  Last 


administered on 2/21/19at 16:58; Admin Dose 1 EA;  Start 2/13/19 at 10:00


Doxazosin Mesylate (Cardura) 4 mg HS PO  Last administered on 2/15/19at 21:14; 


Admin Dose 4 MG;  Start 2/14/19 at 21:00


Trazodone HCl (Desyrel) 50 mg HS PO  Last administered on 2/21/19at 21:50; Admin


Dose 50 MG;  Start 2/16/19 at 00:30


Senna (Senokot) 1 tab BID  PRN PO CONSTIPATION;  Start 2/19/19 at 13:30


Acetazolamide (Diamox) 500 mg Q12 IV  Last administered on 2/22/19at 08:21; 


Admin Dose 500 MG;  Start 2/20/19 at 21:00


Baclofen (Lioresal) 20 mg TID  PRN PO back spasm Last administered on 2/21/19at 


10:08; Admin Dose 20 MG;  Start 2/21/19 at 10:00


Insulin Glargine (Lantus) 20 units QHS SC ;  Start 2/22/19 at 21:00











CARA VILLAREAL                Feb 22, 2019 12:53

## 2019-02-23 VITALS — HEART RATE: 59 BPM

## 2019-02-23 VITALS — SYSTOLIC BLOOD PRESSURE: 120 MMHG | DIASTOLIC BLOOD PRESSURE: 66 MMHG | RESPIRATION RATE: 18 BRPM | HEART RATE: 56 BPM

## 2019-02-23 VITALS — SYSTOLIC BLOOD PRESSURE: 100 MMHG | HEART RATE: 68 BPM | DIASTOLIC BLOOD PRESSURE: 53 MMHG | RESPIRATION RATE: 20 BRPM

## 2019-02-23 VITALS — SYSTOLIC BLOOD PRESSURE: 122 MMHG | HEART RATE: 85 BPM | DIASTOLIC BLOOD PRESSURE: 63 MMHG | RESPIRATION RATE: 16 BRPM

## 2019-02-23 VITALS — RESPIRATION RATE: 20 BRPM | HEART RATE: 51 BPM | SYSTOLIC BLOOD PRESSURE: 100 MMHG | DIASTOLIC BLOOD PRESSURE: 54 MMHG

## 2019-02-23 VITALS — HEART RATE: 54 BPM

## 2019-02-23 LAB
ABNORMAL IP MESSAGE: 1
ADD MAN DIFF?: NO
ANION GAP: 10 (ref 5–13)
BASOPHIL #: 0.1 10^3/UL (ref 0–0.1)
BASOPHILS %: 0.7 % (ref 0–2)
BLOOD UREA NITROGEN: 74 MG/DL (ref 7–20)
CALCIUM: 9.5 MG/DL (ref 8.4–10.2)
CARBON DIOXIDE: 34 MMOL/L (ref 21–31)
CHLORIDE: 93 MMOL/L (ref 97–110)
CREATININE: 2.68 MG/DL (ref 0.61–1.24)
EOSINOPHILS #: 0.1 10^3/UL (ref 0–0.5)
EOSINOPHILS %: 1.2 % (ref 0–7)
GLUCOSE: 194 MG/DL (ref 70–220)
HEMATOCRIT: 39.6 % (ref 42–52)
HEMOGLOBIN: 11.6 G/DL (ref 14–18)
IMMATURE GRANS #M: 0.03 10^3/UL (ref 0–0.03)
IMMATURE GRANS % (M): 0.4 % (ref 0–0.43)
LYMPHOCYTES #: 1 10^3/UL (ref 0.8–2.9)
LYMPHOCYTES %: 11.8 % (ref 15–51)
MAGNESIUM: 2.6 MG/DL (ref 1.7–2.5)
MEAN CORPUSCULAR HEMOGLOBIN: 24.3 PG (ref 29–33)
MEAN CORPUSCULAR HGB CONC: 29.3 G/DL (ref 32–37)
MEAN CORPUSCULAR VOLUME: 83 FL (ref 82–101)
MONOCYTE #: 0.8 10^3/UL (ref 0.3–0.9)
MONOCYTES %: 9.2 % (ref 0–11)
NEUTROPHIL #: 6.5 10^3/UL (ref 1.6–7.5)
NEUTROPHILS %: 76.7 % (ref 39–77)
NUCLEATED RED BLOOD CELLS #: 0 10^3/UL (ref 0–0)
NUCLEATED RED BLOOD CELLS%: 0 /100WBC (ref 0–0)
PHOSPHORUS: 4.4 MG/DL (ref 2.5–4.9)
PLATELET COUNT: 152 10^3/UL (ref 140–415)
POSITIVE DIFF: (no result)
POTASSIUM: 3.8 MMOL/L (ref 3.5–5.1)
RED BLOOD COUNT: 4.77 10^6/UL (ref 4.7–6.1)
RED CELL DISTRIBUTION WIDTH: 17.8 % (ref 11.5–14.5)
SODIUM: 137 MMOL/L (ref 135–144)
WHITE BLOOD COUNT: 8.5 10^3/UL (ref 4.8–10.8)

## 2019-02-23 RX ADMIN — METOCLOPRAMIDE HYDROCHLORIDE 1 MG: 5 TABLET ORAL at 13:02

## 2019-02-23 RX ADMIN — INSULIN ASPART 1 UNIT: 100 INJECTION, SOLUTION INTRAVENOUS; SUBCUTANEOUS at 11:40

## 2019-02-23 RX ADMIN — METOCLOPRAMIDE HYDROCHLORIDE 1 MG: 5 TABLET ORAL at 08:38

## 2019-02-23 RX ADMIN — FERROUS SULFATE TAB 325 MG (65 MG ELEMENTAL FE) 1 MG: 325 (65 FE) TAB at 08:39

## 2019-02-23 RX ADMIN — NICOTINE 1 PATCH: 21 PATCH, EXTENDED RELEASE TRANSDERMAL at 08:41

## 2019-02-23 RX ADMIN — APIXABAN SCH MG: 5 TABLET, FILM COATED ORAL at 08:39

## 2019-02-23 RX ADMIN — METOCLOPRAMIDE HYDROCHLORIDE SCH MG: 5 TABLET ORAL at 08:38

## 2019-02-23 RX ADMIN — METOPROLOL SUCCINATE SCH MG: 25 TABLET, EXTENDED RELEASE ORAL at 08:41

## 2019-02-23 RX ADMIN — METOPROLOL SUCCINATE 1 MG: 25 TABLET, EXTENDED RELEASE ORAL at 08:41

## 2019-02-23 RX ADMIN — INSULIN ASPART 1 UNIT: 100 INJECTION, SOLUTION INTRAVENOUS; SUBCUTANEOUS at 07:42

## 2019-02-23 RX ADMIN — FERROUS SULFATE TAB 325 MG (65 MG ELEMENTAL FE) SCH MG: 325 (65 FE) TAB at 08:39

## 2019-02-23 RX ADMIN — FAMOTIDINE SCH MG: 20 TABLET ORAL at 08:40

## 2019-02-23 RX ADMIN — APIXABAN 1 MG: 5 TABLET, FILM COATED ORAL at 08:39

## 2019-02-23 RX ADMIN — METOCLOPRAMIDE HYDROCHLORIDE SCH MG: 5 TABLET ORAL at 13:02

## 2019-02-23 RX ADMIN — NICOTINE SCH PATCH: 21 PATCH, EXTENDED RELEASE TRANSDERMAL at 08:41

## 2019-02-23 RX ADMIN — FAMOTIDINE 1 MG: 20 TABLET ORAL at 08:40

## 2019-02-23 NOTE — CONS
Assessment/Plan


Assessment/Plan


Hospital Course (Demo Recall)


IMP:


1.CHF- systolic acute on chronic


2.Cardiomyopathy- EF 30%


3.CKD


4.HTN


5.Dyslipidemia


6. Hepatomegaly-likley due to passive congestion of liver


7. nausea/vomiting





Recc:


-Tele


-continue bb and cardura and if BP/HR allows and thus will decrease dose of BB 


to allow patient to better tolerate


-Continue statin/Eliquis


-Digoxin held in the setting of renal failure


-Diamox held and will need to be restarted on lasix at d/c for volume management


-ongoing eval of n/v





Consultation Date/Type/Reason


Admit Date/Time


Feb 11, 2019 at 13:13


Initial Consult Date


2/11/19


Type of Consult


Cardiology


Reason for Consultation


CHF


Requesting Provider:  GORDO MCDERMOTT


Date/Time of Note


DATE: 2/23/19 


TIME: 12:25





Exam/Review of Systems


Vital Signs


Vitals





Vital Signs


  Date      Temp  Pulse  Resp  B/P (MAP)   Pulse Ox  O2          O2 Flow    FiO2


Time                                                 Delivery    Rate


   2/23/19           54


     12:20


   2/23/19  98.7           18      120/66        98  Nasal


     11:42                           (84)            Cannula


   2/23/19                                                             3.0


     07:54


   2/20/19                                                                    30


     01:40








Intake and Output





2/22/19 2/22/19 2/23/19





1515:00


23:00


07:00





IntakeIntake Total


720 ml





BalanceBalance


720 ml














Exam


Exam


Review of Systems:


CONSTITUTIONAL:  No fevers, chills.


PULMONARY:  No sob


CARDIOVASCULAR: No chest pain/palpitations


GASTROINTESTINAL:  No nausea/vomiting.


GENITOURINARY:  No hematuria/dysuria.


MUSCULOSKELETAL:  No myagias/arthalgias.


PSYCHIATRIC:  The patient denies depression.


NEUROLOGIC:   No weakness


Constitutional:  alert


Psych:  no complaints


Head:  normocephalic


ENMT:  mucosa pink and moist


Neck:  supple, jvd (9 cm water)


Respiratory:  diminished breath sounds


Cardiovascular:  regular rate and rhythm


Gastrointestinal:  soft, non-tender


Musculoskeletal:  muscle tone (normal)


Extremities:  edema (none)


Neurological:  other (No focal deficits)





Labs


Result Diagram:  


2/23/19 0944                                                                    


           2/23/19 0944





Results 24hrs





Laboratory Tests


Test
                 2/22/19
17:03  2/22/19
21:07  2/23/19
02:52  2/23/19
07:38


Bedside Glucose              258  H         372  H          131            141


Test
                 2/23/19
09:44  2/23/19
11:36  
              



White Blood Count            8.5  #


Red Blood Count              4.77


Hemoglobin                  11.6  L


Hematocrit                  39.6  L


Mean Corpuscular             83.0


Volume


Mean Corpuscular            24.3  L


Hemoglobin


Mean Corpuscular           29.3  L
  
              
              



Hemoglobin
Concent


Red Cell                    17.8  H


Distribution Width


Platelet Count                152


Mean Platelet Volume


Immature                    0.400


Granulocytes %


Neutrophils %                76.7


Lymphocytes %               11.8  L


Monocytes %                   9.2


Eosinophils %                 1.2


Basophils %                   0.7


Nucleated Red Blood           0.0


Cells %


Immature                    0.030


Granulocytes #


Neutrophils #                 6.5


Lymphocytes #                 1.0


Monocytes #                   0.8


Eosinophils #                 0.1


Basophils #                   0.1


Nucleated Red Blood           0.0


Cells #


Sodium Level                  137


Potassium Level               3.8


Chloride Level                93  L


Carbon Dioxide Level          34  H


Anion Gap                      10


Blood Urea Nitrogen           74  H


Creatinine                  2.68  H


Est Glomerular               24  L
  
              
              



Filtrat Rate
mL/min


Glucose Level                 194


Calcium Level                 9.5


Phosphorus Level              4.4


Magnesium Level              2.6  H


Bedside Glucose                             223  H








Medications


Medications





Current Medications


IV Flush (NS 3 ml) 3 ml PER PROTOCOL IV ;  Start 2/11/19 at 14:00


Ondansetron HCl (Zofran Inj) 4 mg Q6H  PRN IV NAUSEA/VOMITING Last administered 


on 2/22/19at 10:45; Admin Dose 4 MG;  Start 2/11/19 at 14:00


Acetaminophen (Tylenol Tab) 650 mg Q6H  PRN PO .PAIN 1-3 OR TEMP Last 


administered on 2/21/19at 06:56; Admin Dose 650 MG;  Start 2/11/19 at 14:00


Docusate Sodium (Colace) 100 mg Q12H  PRN PO .CONSTIPATION Last administered on 


2/18/19at 20:37; Admin Dose 100 MG;  Start 2/11/19 at 14:00


Albuterol/ Ipratropium (Duoneb) 3 ml Q4H RESP THERAPY  PRN HHN SHORTNESS OF 


BREATH Last administered on 2/14/19at 23:35; Admin Dose 3 ML;  Start 2/11/19 at 


14:00


Hydralazine HCl (Apresoline) 10 mg Q6H  PRN IV ELEVATED BLOOD PRESSURE;  Start 


2/11/19 at 14:00


Nitroglycerin (Nitroglycerin (Sl Tab) 0.4 Mg) 1 tab Q5M  PRN SL ANGINA;  Start 


2/11/19 at 14:00


Apixaban (Eliquis) 5 mg BID PO  Last administered on 2/23/19at 08:39; Admin Dose


5 MG;  Start 2/11/19 at 21:00


Digoxin (Digoxin) 0.125 mg DAILY@1300 PO  Last administered on 2/20/19at 13:00; 


Admin Dose 0.125 MG;  Start 2/12/19 at 13:00;  Status Hold


Ergocalciferol (Drisdol) 50,000 unit Q7D PO  Last administered on 2/18/19at 


13:00; Admin Dose 50,000 UNIT;  Start 2/11/19 at 14:00


Ferrous Sulfate (Ferrous Sulfate (Ec)) 325 mg BID PO  Last administered on 


2/23/19at 08:39; Admin Dose 325 MG;  Start 2/11/19 at 21:00


Famotidine (Pepcid) 20 mg DAILY PO  Last administered on 2/22/19at 08:21; Admin 


Dose 20 MG;  Start 2/12/19 at 09:00


Atorvastatin Calcium (Lipitor) 80 mg HS PO  Last administered on 2/22/19at 


21:18; Admin Dose 80 MG;  Start 2/11/19 at 21:00


Diagnostic Test (Pha) (Accu-Chek) 1 ea 02 XX  Last administered on 2/22/19at 


03:00; Admin Dose 1 EA;  Start 2/12/19 at 02:00


Miscellaneous Information 1 ea NOTE XX ;  Start 2/11/19 at 14:30


Glucose (Glutose) 15 gm Q15M  PRN PO DECREASED GLUCOSE;  Start 2/11/19 at 14:30


Glucose (Glutose) 22.5 gm Q15M  PRN PO DECREASED GLUCOSE;  Start 2/11/19 at 14:3


0


Dextrose (D50w Syringe) 25 ml Q15M  PRN IV DECREASED GLUCOSE;  Start 2/11/19 at 


14:30


Dextrose (D50w Syringe) 50 ml Q15M  PRN IV DECREASED GLUCOSE;  Start 2/11/19 at 


14:30


Glucagon (Glucagen) 1 mg Q15M  PRN IM DECREASED GLUCOSE;  Start 2/11/19 at 14:30


Glucose (Glutose) 15 gm Q15M  PRN BUCCAL DECREASED GLUCOSE Last administered on 


2/14/19at 08:36; Admin Dose 15 GM;  Start 2/11/19 at 14:30


Nicotine (Nicoderm 21 Mg/ 24hr) 1 patch DAILY TRANSDERM ;  Start 2/11/19 at 


16:30


Metoprolol Succinate (Toprol Xl) 25 mg DAILY PO  Last administered on 2/22/19at 


08:21; Admin Dose 25 MG;  Start 2/12/19 at 09:00


Patient Own Medication 1 ea BID PO  Last administered on 2/12/19at 08:09; Admin 


Dose 1 EA;  Start 2/11/19 at 21:00;  Status Hold


Insulin Aspart (Novolog Insulin Pen) (Adult SC Insulin - Mild Algorithm)... AC 


MEALS AND  BEDTIME SC  Last administered on 2/23/19at 11:40; Admin Dose 3 UNIT; 


Start 2/12/19 at 17:25


Miscellaneous Information Patients own medicat... BID@10,16 XX  Last 


administered on 2/21/19at 16:58; Admin Dose 1 EA;  Start 2/13/19 at 10:00


Doxazosin Mesylate (Cardura) 4 mg HS PO  Last administered on 2/22/19at 21:17; 


Admin Dose 4 MG;  Start 2/14/19 at 21:00


Senna (Senokot) 1 tab BID  PRN PO CONSTIPATION;  Start 2/19/19 at 13:30


Insulin Glargine (Lantus) 20 units QHS SC  Last administered on 2/22/19at 21:33;


Admin Dose 20 UNITS;  Start 2/22/19 at 21:00


Metoclopramide HCl (Reglan) 5 mg BEFORE  MEALS PO  Last administered on 


2/22/19at 17:12; Admin Dose 5 MG;  Start 2/22/19 at 17:30











ROSANNE WERNER               Feb 23, 2019 12:31

## 2019-02-23 NOTE — DS
Date/Time of Note


Date/Time of Note


DATE: 2/23/19 


TIME: 16:35





Discharge Summary


Admission/Discharge Info


Admit Date/Time


Feb 11, 2019 at 13:13


Discharge Date/Time


Feb 23, 2019 at 14:20


Discharge Diagnosis


Acute exacerbation of chronic systolic congestive heart failure


Patient Condition:  Fair


Consults


Dr. Gates, cardiology.


Dr. Walters, nephrology


Procedures


None


Hx of Present Illness





CHIEF COMPLAINT:  Shortness of breath and leg swelling.


 


HISTORY OF PRESENT ILLNESS:  A 68-year-old male with past medical history of 


coronary artery disease, prior CABG, CHF with AICD placement, smoking history, 


atrial fibrillation, cardiomyopathy, high cholesterol, CKD, type 2 diabetes and 


hypertension, who has complaints of chest pressure, shortness of breath and leg 


swelling.  The patient went to his primary cardiologist earlier today with the 


same complaints.  He has also been having some dyspnea on exertion and PND and 


also again some mild chest pressure.  Apparently, he has been taking oral 


diuretic at home that did not help the symptoms.  When he arrived to the outp


atient cardiologist, he was told, because of his symptoms, to come to the ER 


where he has come now.  He was given a dose of Lasix IV in the ER as well as 


high-dose aspirin.  He was found with BNP elevated at 19,300.  Also, his first 


troponin was elevated 0.139 and his creatinine was more elevated than before, 


today is 2.26.  The patient was last here at our hospital from 12/07/2018 to 


12/13/2018, so 2 months ago.  At that time, he was treated for acute CHF 


exacerbation as well as community-acquired pneumonia.


 


PAST MEDICAL HISTORY:  As stated above.


 


ALLERGIES:  NO KNOWN DRUG ALLERGIES.


 


HOME MEDICATIONS:


1.  Eliquis 5 mg b.i.d.


2.  Iron sulfate 325 mg b.i.d.


3.  Norvasc 5 mg daily.


4.  Digoxin 0.125 mg daily.


5.  Doxazosin 4 mg b.i.d.


6.  Toprol-XL 25 mg daily.


7.  Crestor 20 mg at bedtime.


8.  Entresto 97/103 one tab b.i.d.


9.  Lasix 40 mg daily.


10.  Nexium 40 mg daily.


11.  Sliding scale insulin.


12.  Aspart at home.


13.  Lantus 12 units subcutaneously at bedtime.


14.  Vitamin D2 50,000 units q. weekly.


 


PAST SURGICAL HISTORY:  Again, he has had coronary artery bypass grafting 


performed and AICD placement as well.


 


SOCIAL HISTORY:  Negative for IV drug abuse, negative for drinking, but positive


smoking history.


 


FAMILY HISTORY:  Noncontributory.


Hospital Course


The patient required several days of IV diuresis. Course was complicated by 


contraction alkalosis with bicarb up to the 40s. He was started on diamox and 


diuresis held. For a few days discharge was delayed by dizziness and 


nausea/vomiting, likely related to the diamox or alkalemia. He also does have 


well characterized hypoxia even when euvolemic, so he will be discharged on home


O2.


Home Meds


Reported Medications


Metoprolol Succinate* (Toprol XL*) 25 Mg Tab.sr.24h, 25 MG PO DAILY, #30 TAB


   2/11/19


Sacubitril/Valsartan (Entresto 97 mg-103 mg Tablet) 1 Each Tablet, 1 TAB PO BID


   12/7/18


Digoxin* (Digox*) 125 Mcg Tablet, 0.125 MG PO DAILY, TAB


   12/7/18


Apixaban* (Eliquis*) 5 Mg Tablet, 5 MG PO BID, TAB


   3/14/16


Insulin Aspart* (Novolog Insulin Vial*) 100 U/Ml Vial, 0 SC SLIDING SCALE AC, 


VIAL


   3/14/16


Insulin Glargine* (Lantus*) 100 Unit/Ml Soln, 12 UNIT SC QHS, #1 VIAL


   3/14/16


Esomeprazole Mag Trihydrate (Nexium) 40 Mg Capsule.dr, 40 MG PO DAILY, #30 CAP


   3/14/16


Furosemide* (Furosemide*) 40 Mg Tablet, 40 MG PO DAILY, TAB


   3/14/16


Amlodipine Besylate* (Norvasc*) 5 Mg Tablet, 5 MG PO DAILY, TAB


   9/15/15


Rosuvastatin Calcium* (Crestor*) 20 Mg Tablet, 20 MG PO HS, TAB


   9/15/15


Ergocalciferol* (Drisdol* (Vitamin D2)) 50,000 Unit Capsule, 12699 UNIT PO Q7D, 


CAP


    MONDAYS


   9/15/15


Doxazosin Mesylate* (Doxazosin Mesylate*) 4 Mg Tablet, 4 MG PO BID, TAB


   3/13/15


Ferrous Sulfate* (Ferrous Sulfate*) 325 Mg Tabec, 325 MG PO BID, TAB


   3/13/15


Follow-up Plan


1. Take all medications as prescribed. Resume your daily Lasix 40mg and 


Entresto.


2. Home oxygen has been delivered. Use 2L at rest, you can increase the rate if 


you feel short of breath.


3. See your cardiologist within 1 week.


4. Return to the emergency room for pressure-like chest pain or worsening 


shortness of breath at rest.


Primary Care Provider


Not On Staff Doctor


Time spent on discharge:   > 30 minutes


Pending Labs





Laboratory Tests


Test
              2/22/19
17:03   2/22/19
21:07   2/23/19
02:52   2/23/19
07:38


Bedside                      258             372             131             141


Glucose
          mg/dL
()  mg/dL
()


Test
              2/23/19
09:44   2/23/19
11:36  
               



White Blood                  8.5  
               
               



Count
           10^3/ul
(4.8-10


                             .8)


Red Blood                   4.77  
               
               



Count
           10^6/ul
(4.70-6


                            .10)


Hemoglobin
                 11.6  
               
               



                 g/dl
(14.0-18.0


                               )


Hematocrit
                 39.6  
               
               



                   %
(42.0-52.0)


Mean                        83.0  
               
               



Corpuscular      fl
(82.0-101.0)


Volume



Mean                        24.3  
               
               



Corpuscular       pg
(29.0-33.0)


Hemoglobin



Mean                        29.3  
               
               



Corpuscular      g/dl
(32.0-37.0


Hemoglobin
Conc                )


ent


Red Cell                    17.8  
               
               



Distribution       %
(11.5-14.5)


Width



Platelet Count
              152  
               
               



                 10^3/UL
(140-41


                              5)


Mean Platelet     fl (7.4-10.4)


Volume


Immature                   0.400  
               
               



Granulocytes %
  %
(0.001-0.429)


Neutrophils %
              76.7  
               
               



                   %
(39.0-77.0)


Lymphocytes %
              11.8  
               
               



                   %
(15.0-51.0)


Monocytes %
                 9.2  
               
               



                    %
(0.0-11.0)


Eosinophils %
   1.2 %
(0.0-7.0)  
               
               



Basophils %
     0.7 %
(0.0-2.0)  
               
               



Nucleated Red                0.0  
               
               



Blood Cells %
   /100WBC
(0.0-0.


                              0)


Immature                   0.030  
               
               



Granulocytes #
  10^3/ul
(0.0-0.


                            031)


Neutrophils #
               6.5  
               
               



                 10^3/ul
(1.6-7.


                              5)


Lymphocytes #
               1.0  
               
               



                 10^3/ul
(0.8-2.


                              9)


Monocytes #
                 0.8  
               
               



                 10^3/ul
(0.3-0.


                              9)


Eosinophils #
               0.1  
               
               



                 10^3/ul
(0.0-0.


                              5)


Basophils #
                 0.1  
               
               



                 10^3/ul
(0.0-0.


                              1)


Nucleated Red                0.0  
               
               



Blood Cells #
   10^3/ul
(0.0-0.


                              0)


Sodium Level
                137  
               
               



                 mmol/L
(135-144


                               )


Potassium                    3.8  
               
               



Level
           mmol/L
(3.5-5.1


                               )


Chloride Level
               93  
               
               



                 mmol/L
()


Carbon Dioxide                34  
               
               



Level
            mmol/L
(21-31)


Anion Gap             10 (5-13)


Blood Urea       74 mg/dl
(7-20)  
               
               



Nitrogen



Creatinine
                 2.68  
               
               



                 mg/dl
(0.61-1.2


                              4)


Est Glomerular   24 mL/min
(>60)  
               
               



Filtrat


Rate
mL/min


Glucose Level
               194  
               
               



                  mg/dl
()


Calcium Level
               9.5  
               
               



                 mg/dl
(8.4-10.2


                               )


Phosphorus                   4.4  
               
               



Level
           mg/dl
(2.5-4.9)


Magnesium                    2.6  
               
               



Level
           mg/dl
(1.7-2.5)


Bedside          
                           223  
               



Glucose
                          mg/dL
()














ROSANNE HERNÁNDEZ MD              Feb 23, 2019 16:38

## 2019-02-23 NOTE — PDOCDIS
Discharge Instructions


DIAGNOSIS


Discharge Diagnosis


Acute exacerbation of chronic systolic congestive heart failure





CONDITION


                 [id number]
Patient Condition:  [id number]
Fair








HOME CARE INSTRUCTIONS:


            [id number]
Diet Instructions:  [id number]
Reduced Sodium








ACTIVITY:


          [id number]
Activity Restrictions:  [id number]
No Restrictions








FOLLOW UP/APPOINTMENTS


Follow-up Plan


1. Take all medications as prescribed. Resume your daily Lasix 40mg and 


Entresto.


2. Home oxygen has been delivered. Use 2L at rest, you can increase the rate if 


you feel short of breath.


3. See your cardiologist within 1 week.


4. Return to the emergency room for pressure-like chest pain or worsening 


shortness of breath at rest.











ROSANNE HERNÁNDEZ MD              Feb 23, 2019 11:40

## 2019-02-23 NOTE — CONS
Assessment/Plan


Assessment/Plan


Hospital Course (Demo Recall)


1.  Shortness of breath with bilateral lower extremity edema, orthopnea and PND 


likely secondary to acute on chronic congestive heart failure.


2.  Acute on chronic renal failure; however, the patient's baseline creatinine 


ranges from 1.9 to 2.  On last discharge in December, the creatinine was 1.9.  


This could be all secondary to cardiorenal as the patient is clearly in 


decompensated heart failure right now., pt had Renal U/S in 12/18 which showed 


chronic kidney disease, 


3.  History of atrial fibrillation.


4.  Hypertension.


5.  Diabetes.


6.  Hyperlipidemia.


7.  BPH.


8.  Positive troponin with chest pain ? NSTEMI. Per cardiology CHF EF 35%


9.  Elevated BNP.


 10 Microcytic hypochromic anemia


11. Overweight


12. fatty liver infiltration


13. s/p cholecystectomy


14/ Alkalosis. 


15. NAusea with episode of vomiting.


Assessment/Plan (Daily)


- d/c planning


-  Digoxin levels normal.


-. Keep a systolic blood pressure of greater than 100.


-   Renally dose all meds.


-   Avoid nephrotoxic agents.





Consultation Date/Type/Reason


Admit Date/Time


Feb 11, 2019 at 13:13


Initial Consult Date


2/11/2019


Type of Consult


nephrology


Reason for Consultation


Dr Germain


Requesting Provider:  GORDO MCDERMOTT


Date/Time of Note


DATE: 2/23/19 


TIME: 12:29





Exam/Review of Systems


Exam


Vitals





Vital Signs


  Date      Temp  Pulse  Resp  B/P (MAP)   Pulse Ox  O2          O2 Flow    FiO2


Time                                                 Delivery    Rate


   2/23/19           54


     12:20


   2/23/19  98.7           18      120/66        98  Nasal


     11:42                           (84)            Cannula


   2/23/19                                                             3.0


     07:54


   2/20/19                                                                    30


     01:40








Intake and Output





2/22/19 2/22/19 2/23/19





1515:00


23:00


07:00





IntakeIntake Total


720 ml





BalanceBalance


720 ml











Constitutional:  alert, oriented


Neck:  supple


Respiratory:  diminished breath sounds


Cardiovascular:  regular rate and rhythm





Results


Result Diagram:  


2/23/19 0944                                                                    


           2/23/19 0944





Results 24hrs





Laboratory Tests


Test
                 2/22/19
17:03  2/22/19
21:07  2/23/19
02:52  2/23/19
07:38


Bedside Glucose              258  H         372  H          131            141


Test
                 2/23/19
09:44  2/23/19
11:36  
              



White Blood Count            8.5  #


Red Blood Count              4.77


Hemoglobin                  11.6  L


Hematocrit                  39.6  L


Mean Corpuscular             83.0


Volume


Mean Corpuscular            24.3  L


Hemoglobin


Mean Corpuscular           29.3  L
  
              
              



Hemoglobin
Concent


Red Cell                    17.8  H


Distribution Width


Platelet Count                152


Mean Platelet Volume


Immature                    0.400


Granulocytes %


Neutrophils %                76.7


Lymphocytes %               11.8  L


Monocytes %                   9.2


Eosinophils %                 1.2


Basophils %                   0.7


Nucleated Red Blood           0.0


Cells %


Immature                    0.030


Granulocytes #


Neutrophils #                 6.5


Lymphocytes #                 1.0


Monocytes #                   0.8


Eosinophils #                 0.1


Basophils #                   0.1


Nucleated Red Blood           0.0


Cells #


Sodium Level                  137


Potassium Level               3.8


Chloride Level                93  L


Carbon Dioxide Level          34  H


Anion Gap                      10


Blood Urea Nitrogen           74  H


Creatinine                  2.68  H


Est Glomerular               24  L
  
              
              



Filtrat Rate
mL/min


Glucose Level                 194


Calcium Level                 9.5


Phosphorus Level              4.4


Magnesium Level              2.6  H


Bedside Glucose                             223  H








Medications


Medication





Current Medications


IV Flush (NS 3 ml) 3 ml PER PROTOCOL IV ;  Start 2/11/19 at 14:00


Ondansetron HCl (Zofran Inj) 4 mg Q6H  PRN IV NAUSEA/VOMITING Last administered 


on 2/22/19at 10:45; Admin Dose 4 MG;  Start 2/11/19 at 14:00


Acetaminophen (Tylenol Tab) 650 mg Q6H  PRN PO .PAIN 1-3 OR TEMP Last 


administered on 2/21/19at 06:56; Admin Dose 650 MG;  Start 2/11/19 at 14:00


Docusate Sodium (Colace) 100 mg Q12H  PRN PO .CONSTIPATION Last administered on 


2/18/19at 20:37; Admin Dose 100 MG;  Start 2/11/19 at 14:00


Albuterol/ Ipratropium (Duoneb) 3 ml Q4H RESP THERAPY  PRN HHN SHORTNESS OF 


BREATH Last administered on 2/14/19at 23:35; Admin Dose 3 ML;  Start 2/11/19 at 


14:00


Hydralazine HCl (Apresoline) 10 mg Q6H  PRN IV ELEVATED BLOOD PRESSURE;  Start 


2/11/19 at 14:00


Nitroglycerin (Nitroglycerin (Sl Tab) 0.4 Mg) 1 tab Q5M  PRN SL ANGINA;  Start 


2/11/19 at 14:00


Apixaban (Eliquis) 5 mg BID PO  Last administered on 2/23/19at 08:39; Admin Dose


5 MG;  Start 2/11/19 at 21:00


Digoxin (Digoxin) 0.125 mg DAILY@1300 PO  Last administered on 2/20/19at 13:00; 


Admin Dose 0.125 MG;  Start 2/12/19 at 13:00;  Status Hold


Ergocalciferol (Drisdol) 50,000 unit Q7D PO  Last administered on 2/18/19at 


13:00; Admin Dose 50,000 UNIT;  Start 2/11/19 at 14:00


Ferrous Sulfate (Ferrous Sulfate (Ec)) 325 mg BID PO  Last administered on 


2/23/19at 08:39; Admin Dose 325 MG;  Start 2/11/19 at 21:00


Famotidine (Pepcid) 20 mg DAILY PO  Last administered on 2/22/19at 08:21; Admin 


Dose 20 MG;  Start 2/12/19 at 09:00


Atorvastatin Calcium (Lipitor) 80 mg HS PO  Last administered on 2/22/19at 


21:18; Admin Dose 80 MG;  Start 2/11/19 at 21:00


Diagnostic Test (Pha) (Accu-Chek) 1 ea 02 XX  Last administered on 2/22/19at 


03:00; Admin Dose 1 EA;  Start 2/12/19 at 02:00


Miscellaneous Information 1 ea NOTE XX ;  Start 2/11/19 at 14:30


Glucose (Glutose) 15 gm Q15M  PRN PO DECREASED GLUCOSE;  Start 2/11/19 at 14:30


Glucose (Glutose) 22.5 gm Q15M  PRN PO DECREASED GLUCOSE;  Start 2/11/19 at 


14:30


Dextrose (D50w Syringe) 25 ml Q15M  PRN IV DECREASED GLUCOSE;  Start 2/11/19 at 


14:30


Dextrose (D50w Syringe) 50 ml Q15M  PRN IV DECREASED GLUCOSE;  Start 2/11/19 at 


14:30


Glucagon (Glucagen) 1 mg Q15M  PRN IM DECREASED GLUCOSE;  Start 2/11/19 at 14:30


Glucose (Glutose) 15 gm Q15M  PRN BUCCAL DECREASED GLUCOSE Last administered on 


2/14/19at 08:36; Admin Dose 15 GM;  Start 2/11/19 at 14:30


Nicotine (Nicoderm 21 Mg/ 24hr) 1 patch DAILY TRANSDERM ;  Start 2/11/19 at 


16:30


Metoprolol Succinate (Toprol Xl) 25 mg DAILY PO  Last administered on 2/22/19at 


08:21; Admin Dose 25 MG;  Start 2/12/19 at 09:00


Patient Own Medication 1 ea BID PO  Last administered on 2/12/19at 08:09; Admin 


Dose 1 EA;  Start 2/11/19 at 21:00;  Status Hold


Insulin Aspart (Novolog Insulin Pen) (Adult SC Insulin - Mild Algorithm)... AC 


MEALS AND  BEDTIME SC  Last administered on 2/23/19at 11:40; Admin Dose 3 UNIT; 


Start 2/12/19 at 17:25


Miscellaneous Information Patients own medicat... BID@10,16 XX  Last 


administered on 2/21/19at 16:58; Admin Dose 1 EA;  Start 2/13/19 at 10:00


Doxazosin Mesylate (Cardura) 4 mg HS PO  Last administered on 2/22/19at 21:17; 


Admin Dose 4 MG;  Start 2/14/19 at 21:00


Senna (Senokot) 1 tab BID  PRN PO CONSTIPATION;  Start 2/19/19 at 13:30


Insulin Glargine (Lantus) 20 units QHS SC  Last administered on 2/22/19at 21:33;


Admin Dose 20 UNITS;  Start 2/22/19 at 21:00


Metoclopramide HCl (Reglan) 5 mg BEFORE  MEALS PO  Last administered on 


2/22/19at 17:12; Admin Dose 5 MG;  Start 2/22/19 at 17:30











CARA VILLAREAL                Feb 23, 2019 12:29

## 2022-04-12 ENCOUNTER — HOSPITAL ENCOUNTER (OUTPATIENT)
Dept: HOSPITAL 54 - MSC | Age: 72
Discharge: HOME | End: 2022-04-12
Attending: INTERNAL MEDICINE
Payer: MEDICARE

## 2022-04-12 DIAGNOSIS — D64.9: ICD-10-CM

## 2022-04-12 DIAGNOSIS — I25.10: ICD-10-CM

## 2022-04-12 DIAGNOSIS — I49.9: ICD-10-CM

## 2022-04-12 DIAGNOSIS — M89.9: ICD-10-CM

## 2022-04-12 DIAGNOSIS — E11.22: Primary | ICD-10-CM

## 2022-04-12 DIAGNOSIS — I13.0: ICD-10-CM

## 2022-04-12 DIAGNOSIS — N18.4: ICD-10-CM

## 2022-04-12 DIAGNOSIS — E83.9: ICD-10-CM

## 2022-04-12 DIAGNOSIS — E78.5: ICD-10-CM

## 2022-04-12 DIAGNOSIS — Z95.1: ICD-10-CM

## 2022-04-12 DIAGNOSIS — I50.9: ICD-10-CM

## 2022-04-12 LAB
ALBUMIN SERPL BCP-MCNC: 3.8 G/DL (ref 3.4–5)
ALP SERPL-CCNC: 119 U/L (ref 46–116)
ALT SERPL W P-5'-P-CCNC: 11 U/L (ref 12–78)
AST SERPL W P-5'-P-CCNC: 11 U/L (ref 15–37)
BASOPHILS # BLD AUTO: 0.1 K/UL (ref 0–0.2)
BASOPHILS NFR BLD AUTO: 0.7 % (ref 0–2)
BILIRUB SERPL-MCNC: 0.7 MG/DL (ref 0.2–1)
BILIRUB UR QL STRIP: NEGATIVE
CALCIUM SERPL-MCNC: 8.5 MG/DL (ref 8.5–10.1)
CHLORIDE SERPL-SCNC: 101 MMOL/L (ref 98–107)
CHOLEST SERPL-MCNC: 126 MG/DL (ref ?–200)
CO2 SERPL-SCNC: 25 MMOL/L (ref 21–32)
COLOR UR: YELLOW
CREAT SERPL-MCNC: 3 MG/DL (ref 0.6–1.3)
CREAT UR-MCNC: 48.1 MG/DL (ref 30–125)
CRP SERPL-MCNC: 1.9 MG/DL (ref 0–0.9)
EOSINOPHIL NFR BLD AUTO: 1 % (ref 0–6)
GLUCOSE SERPL-MCNC: 144 MG/DL (ref 74–106)
GLUCOSE UR STRIP-MCNC: NEGATIVE MG/DL
HCT VFR BLD AUTO: 37 % (ref 39–51)
HDLC SERPL-MCNC: 46 MG/DL (ref 40–60)
HGB BLD-MCNC: 11.4 G/DL (ref 13.5–17.5)
LDLC SERPL DIRECT ASSAY-MCNC: 54 MG/DL (ref 0–99)
LEUKOCYTE ESTERASE UR QL STRIP: NEGATIVE
LYMPHOCYTES NFR BLD AUTO: 1 K/UL (ref 0.8–4.8)
LYMPHOCYTES NFR BLD AUTO: 13 % (ref 20–44)
MAGNESIUM SERPL-MCNC: 2.2 MG/DL (ref 1.8–2.4)
MCHC RBC AUTO-ENTMCNC: 31 G/DL (ref 31–36)
MCV RBC AUTO: 78 FL (ref 80–96)
MONOCYTES NFR BLD AUTO: 0.6 K/UL (ref 0.1–1.3)
MONOCYTES NFR BLD AUTO: 7.6 % (ref 2–12)
NEUTROPHILS # BLD AUTO: 6 K/UL (ref 1.8–8.9)
NEUTROPHILS NFR BLD AUTO: 77.7 % (ref 43–81)
NITRITE UR QL STRIP: NEGATIVE
PH UR STRIP: 5.5 [PH] (ref 5–8)
PHOSPHATE SERPL-MCNC: 4 MG/DL (ref 2.5–4.9)
PLATELET # BLD AUTO: 136 K/UL (ref 150–450)
POTASSIUM SERPL-SCNC: 4.8 MMOL/L (ref 3.5–5.1)
PROT SERPL-MCNC: 8.6 G/DL (ref 6.4–8.2)
PROT UR QL STRIP: 30 MG/DL
PROT UR-MCNC: 62.4 MG/DL (ref 0–11.9)
RBC # BLD AUTO: 4.68 MIL/UL (ref 4.5–6)
RBC #/AREA URNS HPF: (no result) /HPF (ref 0–2)
SODIUM SERPL-SCNC: 139 MMOL/L (ref 136–145)
TRIGL SERPL-MCNC: 155 MG/DL (ref 30–150)
UROBILINOGEN UR STRIP-MCNC: 0.2 EU/DL
WBC #/AREA URNS HPF: (no result) /HPF (ref 0–3)
WBC NRBC COR # BLD AUTO: 7.7 K/UL (ref 4.3–11)

## 2022-04-12 PROCEDURE — 82043 UR ALBUMIN QUANTITATIVE: CPT

## 2022-04-12 PROCEDURE — 86160 COMPLEMENT ANTIGEN: CPT

## 2022-04-12 PROCEDURE — 85025 COMPLETE CBC W/AUTO DIFF WBC: CPT

## 2022-04-12 PROCEDURE — 80053 COMPREHEN METABOLIC PANEL: CPT

## 2022-04-12 PROCEDURE — 82306 VITAMIN D 25 HYDROXY: CPT

## 2022-04-12 PROCEDURE — 82607 VITAMIN B-12: CPT

## 2022-04-12 PROCEDURE — 82570 ASSAY OF URINE CREATININE: CPT

## 2022-04-12 PROCEDURE — 86225 DNA ANTIBODY NATIVE: CPT

## 2022-04-12 PROCEDURE — 83036 HEMOGLOBIN GLYCOSYLATED A1C: CPT

## 2022-04-12 PROCEDURE — 84156 ASSAY OF PROTEIN URINE: CPT

## 2022-04-12 PROCEDURE — 83735 ASSAY OF MAGNESIUM: CPT

## 2022-04-12 PROCEDURE — 84155 ASSAY OF PROTEIN SERUM: CPT

## 2022-04-12 PROCEDURE — 86235 NUCLEAR ANTIGEN ANTIBODY: CPT

## 2022-04-12 PROCEDURE — 83970 ASSAY OF PARATHORMONE: CPT

## 2022-04-12 PROCEDURE — 81001 URINALYSIS AUTO W/SCOPE: CPT

## 2022-04-12 PROCEDURE — 85652 RBC SED RATE AUTOMATED: CPT

## 2022-04-12 PROCEDURE — 80061 LIPID PANEL: CPT

## 2022-04-12 PROCEDURE — 84100 ASSAY OF PHOSPHORUS: CPT

## 2022-04-12 PROCEDURE — 36415 COLL VENOUS BLD VENIPUNCTURE: CPT

## 2022-04-12 PROCEDURE — 82746 ASSAY OF FOLIC ACID SERUM: CPT

## 2022-04-12 PROCEDURE — 84165 PROTEIN E-PHORESIS SERUM: CPT

## 2022-04-12 PROCEDURE — 86140 C-REACTIVE PROTEIN: CPT

## 2022-04-13 LAB
ALBUMIN/GLOB SERPL: 0.8 {RATIO} (ref 0.7–1.7)
ALPHA1 GLOB SERPL ELPH-MCNC: 0.3 G/DL (ref 0–0.4)
ALPHA2 GLOB SERPL ELPH-MCNC: 1 G/DL (ref 0.4–1)
B-GLOBULIN SERPL ELPH-MCNC: 1.2 G/DL (ref 0.7–1.3)
BUN SERPL-MCNC: 89 MG/DL (ref 7–18)
C3 SERPL-MCNC: 140 MG/DL (ref 82–167)
C4 SERPL-MCNC: 25 MG/DL (ref 12–38)
CENTROMERE B AB SER-ACNC: <0.2 AI (ref 0–0.9)
CHROMATIN AB SERPL-ACNC: <0.2 AI (ref 0–0.9)
DSDNA AB SER-ACNC: 2 IU/ML (ref 0–9)
ENA JO1 AB SER-ACNC: <0.2 AI (ref 0–0.9)
ENA RNP AB SER-ACNC: <0.2 AI (ref 0–0.9)
ENA SCL70 AB SER-ACNC: 0.4 AI (ref 0–0.9)
ENA SM AB SER-ACNC: <0.2 AI (ref 0–0.9)
ENA SS-A AB SER-ACNC: <0.2 AI (ref 0–0.9)
ENA SS-B AB SER-ACNC: <0.2 AI (ref 0–0.9)

## 2022-04-14 ENCOUNTER — HOSPITAL ENCOUNTER (OUTPATIENT)
Dept: HOSPITAL 54 - US | Age: 72
Discharge: HOME | End: 2022-04-14
Attending: INTERNAL MEDICINE
Payer: MEDICARE

## 2022-04-14 DIAGNOSIS — N18.9: Primary | ICD-10-CM

## 2022-04-14 DIAGNOSIS — N17.9: ICD-10-CM

## 2022-04-19 ENCOUNTER — HOSPITAL ENCOUNTER (OUTPATIENT)
Dept: HOSPITAL 54 - MSC | Age: 72
Discharge: HOME | End: 2022-04-19
Attending: INTERNAL MEDICINE
Payer: MEDICARE

## 2022-04-19 DIAGNOSIS — R31.9: ICD-10-CM

## 2022-04-19 DIAGNOSIS — E83.9: ICD-10-CM

## 2022-04-19 DIAGNOSIS — I50.9: ICD-10-CM

## 2022-04-19 DIAGNOSIS — N18.4: ICD-10-CM

## 2022-04-19 DIAGNOSIS — Z95.1: ICD-10-CM

## 2022-04-19 DIAGNOSIS — D64.9: ICD-10-CM

## 2022-04-19 DIAGNOSIS — M89.9: ICD-10-CM

## 2022-04-19 DIAGNOSIS — E11.22: Primary | ICD-10-CM

## 2022-04-19 DIAGNOSIS — I49.9: ICD-10-CM

## 2022-04-19 DIAGNOSIS — I13.0: ICD-10-CM

## 2022-04-19 DIAGNOSIS — I25.10: ICD-10-CM

## 2022-04-19 DIAGNOSIS — E78.5: ICD-10-CM

## 2022-05-17 ENCOUNTER — HOSPITAL ENCOUNTER (OUTPATIENT)
Dept: HOSPITAL 54 - MSC | Age: 72
Discharge: HOME | End: 2022-05-17
Attending: INTERNAL MEDICINE
Payer: MEDICARE

## 2022-05-17 DIAGNOSIS — I13.0: ICD-10-CM

## 2022-05-17 DIAGNOSIS — E11.22: Primary | ICD-10-CM

## 2022-05-17 DIAGNOSIS — Z95.1: ICD-10-CM

## 2022-05-17 DIAGNOSIS — D64.9: ICD-10-CM

## 2022-05-17 DIAGNOSIS — E78.5: ICD-10-CM

## 2022-05-17 DIAGNOSIS — R31.9: ICD-10-CM

## 2022-05-17 DIAGNOSIS — I25.10: ICD-10-CM

## 2022-05-17 DIAGNOSIS — M89.9: ICD-10-CM

## 2022-05-17 DIAGNOSIS — I49.9: ICD-10-CM

## 2022-05-17 DIAGNOSIS — I50.9: ICD-10-CM

## 2022-05-17 DIAGNOSIS — E83.9: ICD-10-CM

## 2022-05-17 DIAGNOSIS — N18.4: ICD-10-CM
